# Patient Record
Sex: MALE | Race: WHITE | Employment: OTHER | RURAL
[De-identification: names, ages, dates, MRNs, and addresses within clinical notes are randomized per-mention and may not be internally consistent; named-entity substitution may affect disease eponyms.]

---

## 2017-01-01 ENCOUNTER — OFFICE VISIT (OUTPATIENT)
Dept: FAMILY MEDICINE CLINIC | Age: 72
End: 2017-01-01

## 2017-01-01 ENCOUNTER — PATIENT OUTREACH (OUTPATIENT)
Dept: FAMILY MEDICINE CLINIC | Age: 72
End: 2017-01-01

## 2017-01-01 ENCOUNTER — TELEPHONE (OUTPATIENT)
Dept: FAMILY MEDICINE CLINIC | Age: 72
End: 2017-01-01

## 2017-01-01 ENCOUNTER — HOSPITAL ENCOUNTER (INPATIENT)
Age: 72
LOS: 3 days | Discharge: HOSPICE | DRG: 178 | End: 2017-04-11
Attending: EMERGENCY MEDICINE | Admitting: FAMILY MEDICINE
Payer: MEDICARE

## 2017-01-01 ENCOUNTER — APPOINTMENT (OUTPATIENT)
Dept: GENERAL RADIOLOGY | Age: 72
DRG: 607 | End: 2017-01-01
Attending: EMERGENCY MEDICINE
Payer: MEDICARE

## 2017-01-01 ENCOUNTER — APPOINTMENT (OUTPATIENT)
Dept: CT IMAGING | Age: 72
DRG: 178 | End: 2017-01-01
Attending: FAMILY MEDICINE
Payer: MEDICARE

## 2017-01-01 ENCOUNTER — HOSPITAL ENCOUNTER (INPATIENT)
Age: 72
LOS: 2 days | Discharge: HOME HEALTH | DRG: 607 | End: 2017-02-19
Attending: EMERGENCY MEDICINE | Admitting: FAMILY MEDICINE
Payer: MEDICARE

## 2017-01-01 ENCOUNTER — TELEPHONE (OUTPATIENT)
Dept: NEUROLOGY | Age: 72
End: 2017-01-01

## 2017-01-01 ENCOUNTER — HOSPITAL ENCOUNTER (OUTPATIENT)
Dept: VASCULAR SURGERY | Age: 72
Discharge: HOME | End: 2017-01-31
Attending: FAMILY MEDICINE
Payer: MEDICARE

## 2017-01-01 ENCOUNTER — APPOINTMENT (OUTPATIENT)
Dept: GENERAL RADIOLOGY | Age: 72
DRG: 178 | End: 2017-01-01
Attending: EMERGENCY MEDICINE
Payer: MEDICARE

## 2017-01-01 ENCOUNTER — APPOINTMENT (OUTPATIENT)
Dept: GENERAL RADIOLOGY | Age: 72
DRG: 178 | End: 2017-01-01
Attending: FAMILY MEDICINE
Payer: MEDICARE

## 2017-01-01 ENCOUNTER — APPOINTMENT (OUTPATIENT)
Dept: CT IMAGING | Age: 72
DRG: 178 | End: 2017-01-01
Attending: EMERGENCY MEDICINE
Payer: MEDICARE

## 2017-01-01 ENCOUNTER — HOSPITAL ENCOUNTER (OUTPATIENT)
Dept: VASCULAR SURGERY | Age: 72
Discharge: HOME | End: 2017-02-01
Attending: FAMILY MEDICINE
Payer: MEDICARE

## 2017-01-01 ENCOUNTER — APPOINTMENT (OUTPATIENT)
Dept: MRI IMAGING | Age: 72
DRG: 178 | End: 2017-01-01
Attending: FAMILY MEDICINE
Payer: MEDICARE

## 2017-01-01 ENCOUNTER — OFFICE VISIT (OUTPATIENT)
Dept: NEUROLOGY | Age: 72
End: 2017-01-01

## 2017-01-01 VITALS
TEMPERATURE: 97.5 F | OXYGEN SATURATION: 98 % | RESPIRATION RATE: 16 BRPM | WEIGHT: 178 LBS | HEIGHT: 65 IN | BODY MASS INDEX: 29.66 KG/M2 | DIASTOLIC BLOOD PRESSURE: 64 MMHG | HEART RATE: 60 BPM | SYSTOLIC BLOOD PRESSURE: 110 MMHG

## 2017-01-01 VITALS
TEMPERATURE: 99.3 F | HEIGHT: 65 IN | WEIGHT: 180 LBS | SYSTOLIC BLOOD PRESSURE: 103 MMHG | RESPIRATION RATE: 16 BRPM | BODY MASS INDEX: 29.99 KG/M2 | OXYGEN SATURATION: 98 % | HEART RATE: 64 BPM | DIASTOLIC BLOOD PRESSURE: 61 MMHG

## 2017-01-01 VITALS
OXYGEN SATURATION: 95 % | RESPIRATION RATE: 18 BRPM | HEART RATE: 51 BPM | HEIGHT: 65 IN | SYSTOLIC BLOOD PRESSURE: 113 MMHG | WEIGHT: 174 LBS | DIASTOLIC BLOOD PRESSURE: 70 MMHG | TEMPERATURE: 97 F | BODY MASS INDEX: 28.99 KG/M2

## 2017-01-01 VITALS
HEIGHT: 65 IN | DIASTOLIC BLOOD PRESSURE: 43 MMHG | RESPIRATION RATE: 20 BRPM | OXYGEN SATURATION: 98 % | TEMPERATURE: 97.7 F | HEART RATE: 60 BPM | SYSTOLIC BLOOD PRESSURE: 114 MMHG | BODY MASS INDEX: 28.69 KG/M2 | WEIGHT: 172.2 LBS

## 2017-01-01 VITALS
SYSTOLIC BLOOD PRESSURE: 110 MMHG | TEMPERATURE: 98 F | HEIGHT: 65 IN | DIASTOLIC BLOOD PRESSURE: 62 MMHG | OXYGEN SATURATION: 94 % | RESPIRATION RATE: 16 BRPM | WEIGHT: 178 LBS | BODY MASS INDEX: 29.66 KG/M2 | HEART RATE: 62 BPM

## 2017-01-01 VITALS
RESPIRATION RATE: 18 BRPM | SYSTOLIC BLOOD PRESSURE: 125 MMHG | DIASTOLIC BLOOD PRESSURE: 66 MMHG | HEIGHT: 65 IN | WEIGHT: 183.64 LBS | TEMPERATURE: 98.5 F | HEART RATE: 71 BPM | OXYGEN SATURATION: 95 % | BODY MASS INDEX: 30.6 KG/M2

## 2017-01-01 VITALS
BODY MASS INDEX: 29.79 KG/M2 | TEMPERATURE: 98.1 F | WEIGHT: 178.8 LBS | HEIGHT: 65 IN | RESPIRATION RATE: 22 BRPM | OXYGEN SATURATION: 90 % | SYSTOLIC BLOOD PRESSURE: 137 MMHG | DIASTOLIC BLOOD PRESSURE: 98 MMHG | HEART RATE: 66 BPM

## 2017-01-01 DIAGNOSIS — Z09 HOSPITAL DISCHARGE FOLLOW-UP: ICD-10-CM

## 2017-01-01 DIAGNOSIS — R09.89 POOR CIRCULATION OF EXTREMITY: ICD-10-CM

## 2017-01-01 DIAGNOSIS — I10 ESSENTIAL HYPERTENSION, BENIGN: ICD-10-CM

## 2017-01-01 DIAGNOSIS — J01.40 ACUTE NON-RECURRENT PANSINUSITIS: Primary | ICD-10-CM

## 2017-01-01 DIAGNOSIS — L97.409 HEEL ULCER DUE TO DM (HCC): Primary | ICD-10-CM

## 2017-01-01 DIAGNOSIS — R53.81 DEBILITY: ICD-10-CM

## 2017-01-01 DIAGNOSIS — G30.1 LATE ONSET ALZHEIMER'S DISEASE WITH BEHAVIORAL DISTURBANCE (HCC): Primary | Chronic | ICD-10-CM

## 2017-01-01 DIAGNOSIS — E11.621 CONTROLLED TYPE 2 DIABETES MELLITUS WITH FOOT ULCER, WITHOUT LONG-TERM CURRENT USE OF INSULIN (HCC): Primary | ICD-10-CM

## 2017-01-01 DIAGNOSIS — E43 SEVERE PROTEIN-CALORIE MALNUTRITION (HCC): ICD-10-CM

## 2017-01-01 DIAGNOSIS — Z71.82 EXERCISE COUNSELING: ICD-10-CM

## 2017-01-01 DIAGNOSIS — L97.511 ULCER OF FOOT, RIGHT, LIMITED TO BREAKDOWN OF SKIN (HCC): ICD-10-CM

## 2017-01-01 DIAGNOSIS — S91.309A: ICD-10-CM

## 2017-01-01 DIAGNOSIS — B37.0 THRUSH OF MOUTH AND ESOPHAGUS (HCC): ICD-10-CM

## 2017-01-01 DIAGNOSIS — R21 RASH AND OTHER NONSPECIFIC SKIN ERUPTION: Primary | ICD-10-CM

## 2017-01-01 DIAGNOSIS — L97.509 CONTROLLED TYPE 2 DIABETES MELLITUS WITH FOOT ULCER, WITHOUT LONG-TERM CURRENT USE OF INSULIN (HCC): ICD-10-CM

## 2017-01-01 DIAGNOSIS — R60.0 EDEMA OF BOTH FEET: ICD-10-CM

## 2017-01-01 DIAGNOSIS — F03.90 DEMENTIA WITHOUT BEHAVIORAL DISTURBANCE, UNSPECIFIED DEMENTIA TYPE: ICD-10-CM

## 2017-01-01 DIAGNOSIS — I95.9 HYPOTENSION, UNSPECIFIED HYPOTENSION TYPE: ICD-10-CM

## 2017-01-01 DIAGNOSIS — F02.818 LATE ONSET ALZHEIMER'S DISEASE WITH BEHAVIORAL DISTURBANCE (HCC): ICD-10-CM

## 2017-01-01 DIAGNOSIS — F02.818 LATE ONSET ALZHEIMER'S DISEASE WITH BEHAVIORAL DISTURBANCE (HCC): Primary | Chronic | ICD-10-CM

## 2017-01-01 DIAGNOSIS — F02.80 ALZHEIMER'S DISEASE OF OTHER ONSET WITHOUT BEHAVIORAL DISTURBANCE: ICD-10-CM

## 2017-01-01 DIAGNOSIS — L97.411 HEEL ULCER, RIGHT, LIMITED TO BREAKDOWN OF SKIN (HCC): ICD-10-CM

## 2017-01-01 DIAGNOSIS — B37.81 THRUSH OF MOUTH AND ESOPHAGUS (HCC): ICD-10-CM

## 2017-01-01 DIAGNOSIS — E11.621 CONTROLLED TYPE 2 DIABETES MELLITUS WITH FOOT ULCER, WITHOUT LONG-TERM CURRENT USE OF INSULIN (HCC): ICD-10-CM

## 2017-01-01 DIAGNOSIS — L03.116 CELLULITIS OF LEFT LOWER EXTREMITY: Primary | ICD-10-CM

## 2017-01-01 DIAGNOSIS — L97.509 CONTROLLED TYPE 2 DIABETES MELLITUS WITH FOOT ULCER, WITHOUT LONG-TERM CURRENT USE OF INSULIN (HCC): Primary | Chronic | ICD-10-CM

## 2017-01-01 DIAGNOSIS — E78.00 PURE HYPERCHOLESTEROLEMIA: ICD-10-CM

## 2017-01-01 DIAGNOSIS — E11.621 CONTROLLED TYPE 2 DIABETES MELLITUS WITH FOOT ULCER, WITHOUT LONG-TERM CURRENT USE OF INSULIN (HCC): Primary | Chronic | ICD-10-CM

## 2017-01-01 DIAGNOSIS — R60.0 EDEMA OF BOTH FEET: Primary | ICD-10-CM

## 2017-01-01 DIAGNOSIS — G30.8 ALZHEIMER'S DISEASE OF OTHER ONSET WITHOUT BEHAVIORAL DISTURBANCE: ICD-10-CM

## 2017-01-01 DIAGNOSIS — Z71.89 GOALS OF CARE, COUNSELING/DISCUSSION: ICD-10-CM

## 2017-01-01 DIAGNOSIS — R60.0 LOCALIZED EDEMA: ICD-10-CM

## 2017-01-01 DIAGNOSIS — M48.00 SPINAL STENOSIS, UNSPECIFIED REGION OTHER THAN CERVICAL: Chronic | ICD-10-CM

## 2017-01-01 DIAGNOSIS — Z71.3 DIETARY COUNSELING AND SURVEILLANCE: ICD-10-CM

## 2017-01-01 DIAGNOSIS — R21 RASH: ICD-10-CM

## 2017-01-01 DIAGNOSIS — I10 ESSENTIAL HYPERTENSION, BENIGN: Chronic | ICD-10-CM

## 2017-01-01 DIAGNOSIS — R55 SYNCOPE AND COLLAPSE: Primary | ICD-10-CM

## 2017-01-01 DIAGNOSIS — G30.1 LATE ONSET ALZHEIMER'S DISEASE WITH BEHAVIORAL DISTURBANCE (HCC): ICD-10-CM

## 2017-01-01 DIAGNOSIS — L97.509 CONTROLLED TYPE 2 DIABETES MELLITUS WITH FOOT ULCER, WITHOUT LONG-TERM CURRENT USE OF INSULIN (HCC): Primary | ICD-10-CM

## 2017-01-01 DIAGNOSIS — L97.422 HEEL ULCER, LEFT, WITH FAT LAYER EXPOSED (HCC): ICD-10-CM

## 2017-01-01 DIAGNOSIS — E11.8 TYPE 2 DIABETES MELLITUS WITH COMPLICATION, WITHOUT LONG-TERM CURRENT USE OF INSULIN (HCC): Chronic | ICD-10-CM

## 2017-01-01 DIAGNOSIS — E11.621 HEEL ULCER DUE TO DM (HCC): Primary | ICD-10-CM

## 2017-01-01 LAB
ALBUMIN SERPL BCP-MCNC: 1.9 G/DL (ref 3.5–5)
ALBUMIN SERPL BCP-MCNC: 2 G/DL (ref 3.5–5)
ALBUMIN SERPL BCP-MCNC: 2.1 G/DL (ref 3.5–5)
ALBUMIN SERPL BCP-MCNC: 2.4 G/DL (ref 3.5–5)
ALBUMIN SERPL BCP-MCNC: 2.6 G/DL (ref 3.5–5)
ALBUMIN SERPL BCP-MCNC: 2.9 G/DL (ref 3.5–5)
ALBUMIN SERPL BCP-MCNC: 3.2 G/DL (ref 3.5–5)
ALBUMIN/GLOB SERPL: 0.5 {RATIO} (ref 1.1–2.2)
ALBUMIN/GLOB SERPL: 0.6 {RATIO} (ref 1.1–2.2)
ALBUMIN/GLOB SERPL: 0.8 {RATIO} (ref 1.1–2.2)
ALBUMIN/GLOB SERPL: 0.8 {RATIO} (ref 1.1–2.2)
ALBUMIN/GLOB SERPL: 0.9 {RATIO} (ref 1.1–2.2)
ALP SERPL-CCNC: 105 U/L (ref 45–117)
ALP SERPL-CCNC: 106 U/L (ref 45–117)
ALP SERPL-CCNC: 108 U/L (ref 45–117)
ALP SERPL-CCNC: 145 U/L (ref 45–117)
ALP SERPL-CCNC: 67 U/L (ref 45–117)
ALP SERPL-CCNC: 91 U/L (ref 45–117)
ALP SERPL-CCNC: 96 U/L (ref 45–117)
ALT SERPL-CCNC: 23 U/L (ref 12–78)
ALT SERPL-CCNC: 23 U/L (ref 12–78)
ALT SERPL-CCNC: 28 U/L (ref 12–78)
ALT SERPL-CCNC: 39 U/L (ref 12–78)
ALT SERPL-CCNC: 41 U/L (ref 12–78)
ALT SERPL-CCNC: 47 U/L (ref 12–78)
ALT SERPL-CCNC: 57 U/L (ref 12–78)
ANION GAP BLD CALC-SCNC: 10 MMOL/L (ref 5–15)
ANION GAP BLD CALC-SCNC: 11 MMOL/L (ref 5–15)
ANION GAP BLD CALC-SCNC: 5 MMOL/L (ref 5–15)
ANION GAP BLD CALC-SCNC: 7 MMOL/L (ref 5–15)
ANION GAP BLD CALC-SCNC: 7 MMOL/L (ref 5–15)
ANION GAP BLD CALC-SCNC: 9 MMOL/L (ref 5–15)
ANION GAP BLD CALC-SCNC: 9 MMOL/L (ref 5–15)
APPEARANCE UR: CLEAR
APPEARANCE UR: CLEAR
APTT PPP: 31.3 SEC (ref 22.1–32.5)
AST SERPL W P-5'-P-CCNC: 22 U/L (ref 15–37)
AST SERPL W P-5'-P-CCNC: 22 U/L (ref 15–37)
AST SERPL W P-5'-P-CCNC: 23 U/L (ref 15–37)
AST SERPL W P-5'-P-CCNC: 30 U/L (ref 15–37)
AST SERPL W P-5'-P-CCNC: 31 U/L (ref 15–37)
AST SERPL W P-5'-P-CCNC: 35 U/L (ref 15–37)
AST SERPL W P-5'-P-CCNC: 41 U/L (ref 15–37)
ATRIAL RATE: 57 BPM
ATRIAL RATE: 64 BPM
BACTERIA SPEC CULT: NORMAL
BACTERIA SPEC CULT: NORMAL
BACTERIA URNS QL MICRO: NEGATIVE /HPF
BACTERIA URNS QL MICRO: NEGATIVE /HPF
BASOPHILS # BLD AUTO: 0 K/UL (ref 0–0.1)
BASOPHILS # BLD: 0 % (ref 0–1)
BILIRUB SERPL-MCNC: 0.2 MG/DL (ref 0.2–1)
BILIRUB SERPL-MCNC: 0.3 MG/DL (ref 0.2–1)
BILIRUB SERPL-MCNC: 0.3 MG/DL (ref 0.2–1)
BILIRUB SERPL-MCNC: 0.4 MG/DL (ref 0.2–1)
BILIRUB SERPL-MCNC: 0.4 MG/DL (ref 0.2–1)
BILIRUB SERPL-MCNC: 0.5 MG/DL (ref 0.2–1)
BILIRUB SERPL-MCNC: 0.5 MG/DL (ref 0.2–1)
BILIRUB UR QL: NEGATIVE
BILIRUB UR QL: NEGATIVE
BUN SERPL-MCNC: 12 MG/DL (ref 6–20)
BUN SERPL-MCNC: 19 MG/DL (ref 6–20)
BUN SERPL-MCNC: 20 MG/DL (ref 6–20)
BUN SERPL-MCNC: 23 MG/DL (ref 6–20)
BUN SERPL-MCNC: 25 MG/DL (ref 6–20)
BUN SERPL-MCNC: 28 MG/DL (ref 6–20)
BUN SERPL-MCNC: 31 MG/DL (ref 6–20)
BUN/CREAT SERPL: 14 (ref 12–20)
BUN/CREAT SERPL: 19 (ref 12–20)
BUN/CREAT SERPL: 20 (ref 12–20)
BUN/CREAT SERPL: 21 (ref 12–20)
BUN/CREAT SERPL: 23 (ref 12–20)
BUN/CREAT SERPL: 25 (ref 12–20)
BUN/CREAT SERPL: 32 (ref 12–20)
CALCIUM SERPL-MCNC: 7.8 MG/DL (ref 8.5–10.1)
CALCIUM SERPL-MCNC: 7.9 MG/DL (ref 8.5–10.1)
CALCIUM SERPL-MCNC: 7.9 MG/DL (ref 8.5–10.1)
CALCIUM SERPL-MCNC: 8.1 MG/DL (ref 8.5–10.1)
CALCIUM SERPL-MCNC: 8.2 MG/DL (ref 8.5–10.1)
CALCIUM SERPL-MCNC: 8.4 MG/DL (ref 8.5–10.1)
CALCIUM SERPL-MCNC: 8.7 MG/DL (ref 8.5–10.1)
CALCULATED P AXIS, ECG09: -12 DEGREES
CALCULATED P AXIS, ECG09: 32 DEGREES
CALCULATED R AXIS, ECG10: -61 DEGREES
CALCULATED R AXIS, ECG10: -65 DEGREES
CALCULATED T AXIS, ECG11: 23 DEGREES
CALCULATED T AXIS, ECG11: 49 DEGREES
CHLORIDE SERPL-SCNC: 103 MMOL/L (ref 97–108)
CHLORIDE SERPL-SCNC: 103 MMOL/L (ref 97–108)
CHLORIDE SERPL-SCNC: 104 MMOL/L (ref 97–108)
CHLORIDE SERPL-SCNC: 106 MMOL/L (ref 97–108)
CHLORIDE SERPL-SCNC: 107 MMOL/L (ref 97–108)
CHLORIDE SERPL-SCNC: 110 MMOL/L (ref 97–108)
CHLORIDE SERPL-SCNC: 112 MMOL/L (ref 97–108)
CHOLEST SERPL-MCNC: 94 MG/DL
CK MB CFR SERPL CALC: 2.5 % (ref 0–2.5)
CK MB SERPL-MCNC: 4.2 NG/ML (ref 5–25)
CK SERPL-CCNC: 166 U/L (ref 39–308)
CK SERPL-CCNC: 41 U/L (ref 39–308)
CO2 SERPL-SCNC: 22 MMOL/L (ref 21–32)
CO2 SERPL-SCNC: 24 MMOL/L (ref 21–32)
CO2 SERPL-SCNC: 26 MMOL/L (ref 21–32)
CO2 SERPL-SCNC: 27 MMOL/L (ref 21–32)
CO2 SERPL-SCNC: 31 MMOL/L (ref 21–32)
COLOR UR: NORMAL
COLOR UR: NORMAL
CREAT SERPL-MCNC: 0.86 MG/DL (ref 0.7–1.3)
CREAT SERPL-MCNC: 0.92 MG/DL (ref 0.7–1.3)
CREAT SERPL-MCNC: 0.97 MG/DL (ref 0.7–1.3)
CREAT SERPL-MCNC: 1.01 MG/DL (ref 0.7–1.3)
CREAT SERPL-MCNC: 1.01 MG/DL (ref 0.7–1.3)
CREAT SERPL-MCNC: 1.17 MG/DL (ref 0.7–1.3)
CREAT SERPL-MCNC: 1.24 MG/DL (ref 0.7–1.3)
CRP SERPL-MCNC: 3.9 MG/DL
DIAGNOSIS, 93000: NORMAL
DIAGNOSIS, 93000: NORMAL
DIFFERENTIAL METHOD BLD: ABNORMAL
EOSINOPHIL # BLD: 0 K/UL (ref 0–0.4)
EOSINOPHIL # BLD: 0 K/UL (ref 0–0.4)
EOSINOPHIL # BLD: 0.2 K/UL (ref 0–0.4)
EOSINOPHIL # BLD: 0.2 K/UL (ref 0–0.4)
EOSINOPHIL # BLD: 0.3 K/UL (ref 0–0.4)
EOSINOPHIL # BLD: 0.7 K/UL (ref 0–0.4)
EOSINOPHIL NFR BLD: 0 % (ref 0–7)
EOSINOPHIL NFR BLD: 0 % (ref 0–7)
EOSINOPHIL NFR BLD: 2 % (ref 0–7)
EOSINOPHIL NFR BLD: 7 % (ref 0–7)
EPITH CASTS URNS QL MICRO: NORMAL /LPF
EPITH CASTS URNS QL MICRO: NORMAL /LPF
ERYTHROCYTE [DISTWIDTH] IN BLOOD BY AUTOMATED COUNT: 14.1 % (ref 11.5–14.5)
ERYTHROCYTE [DISTWIDTH] IN BLOOD BY AUTOMATED COUNT: 14.1 % (ref 11.5–14.5)
ERYTHROCYTE [DISTWIDTH] IN BLOOD BY AUTOMATED COUNT: 14.3 % (ref 11.5–14.5)
ERYTHROCYTE [DISTWIDTH] IN BLOOD BY AUTOMATED COUNT: 14.3 % (ref 11.5–14.5)
ERYTHROCYTE [DISTWIDTH] IN BLOOD BY AUTOMATED COUNT: 14.4 % (ref 11.5–14.5)
ERYTHROCYTE [DISTWIDTH] IN BLOOD BY AUTOMATED COUNT: 14.5 % (ref 11.5–14.5)
ERYTHROCYTE [SEDIMENTATION RATE] IN BLOOD: 21 MM/HR (ref 0–20)
EST. AVERAGE GLUCOSE BLD GHB EST-MCNC: 131 MG/DL
GLOBULIN SER CALC-MCNC: 3.3 G/DL (ref 2–4)
GLOBULIN SER CALC-MCNC: 3.3 G/DL (ref 2–4)
GLOBULIN SER CALC-MCNC: 3.4 G/DL (ref 2–4)
GLOBULIN SER CALC-MCNC: 3.4 G/DL (ref 2–4)
GLOBULIN SER CALC-MCNC: 3.5 G/DL (ref 2–4)
GLOBULIN SER CALC-MCNC: 3.7 G/DL (ref 2–4)
GLOBULIN SER CALC-MCNC: 3.9 G/DL (ref 2–4)
GLUCOSE BLD STRIP.AUTO-MCNC: 102 MG/DL (ref 65–100)
GLUCOSE BLD STRIP.AUTO-MCNC: 104 MG/DL (ref 65–100)
GLUCOSE BLD STRIP.AUTO-MCNC: 108 MG/DL (ref 65–100)
GLUCOSE BLD STRIP.AUTO-MCNC: 112 MG/DL (ref 65–100)
GLUCOSE BLD STRIP.AUTO-MCNC: 113 MG/DL (ref 65–100)
GLUCOSE BLD STRIP.AUTO-MCNC: 113 MG/DL (ref 65–100)
GLUCOSE BLD STRIP.AUTO-MCNC: 125 MG/DL (ref 65–100)
GLUCOSE BLD STRIP.AUTO-MCNC: 136 MG/DL (ref 65–100)
GLUCOSE BLD STRIP.AUTO-MCNC: 144 MG/DL (ref 65–100)
GLUCOSE BLD STRIP.AUTO-MCNC: 149 MG/DL (ref 65–100)
GLUCOSE BLD STRIP.AUTO-MCNC: 154 MG/DL (ref 65–100)
GLUCOSE BLD STRIP.AUTO-MCNC: 162 MG/DL (ref 65–100)
GLUCOSE SERPL-MCNC: 100 MG/DL (ref 65–100)
GLUCOSE SERPL-MCNC: 108 MG/DL (ref 65–100)
GLUCOSE SERPL-MCNC: 132 MG/DL (ref 65–100)
GLUCOSE SERPL-MCNC: 176 MG/DL (ref 65–100)
GLUCOSE SERPL-MCNC: 93 MG/DL (ref 65–100)
GLUCOSE SERPL-MCNC: 97 MG/DL (ref 65–100)
GLUCOSE SERPL-MCNC: 99 MG/DL (ref 65–100)
GLUCOSE UR STRIP.AUTO-MCNC: NEGATIVE MG/DL
GLUCOSE UR STRIP.AUTO-MCNC: NEGATIVE MG/DL
HBA1C MFR BLD: 6.2 % (ref 4.2–6.3)
HCT VFR BLD AUTO: 30.1 % (ref 36.6–50.3)
HCT VFR BLD AUTO: 31.8 % (ref 36.6–50.3)
HCT VFR BLD AUTO: 31.9 % (ref 36.6–50.3)
HCT VFR BLD AUTO: 36.6 % (ref 36.6–50.3)
HCT VFR BLD AUTO: 37.9 % (ref 36.6–50.3)
HCT VFR BLD AUTO: 38.6 % (ref 36.6–50.3)
HDLC SERPL-MCNC: 41 MG/DL
HDLC SERPL: 2.3 {RATIO} (ref 0–5)
HGB BLD-MCNC: 10.6 G/DL (ref 12.1–17)
HGB BLD-MCNC: 10.6 G/DL (ref 12.1–17)
HGB BLD-MCNC: 12.1 G/DL (ref 12.1–17)
HGB BLD-MCNC: 12.3 G/DL (ref 12.1–17)
HGB BLD-MCNC: 12.6 G/DL (ref 12.1–17)
HGB BLD-MCNC: 9.6 G/DL (ref 12.1–17)
HGB UR QL STRIP: NEGATIVE
HGB UR QL STRIP: NEGATIVE
HIV 1+2 AB+HIV1 P24 AG SERPL QL IA: NONREACTIVE
HIV12 RESULT COMMENT, HHIVC: NORMAL
INR BLD: 1.2 (ref 0.9–1.2)
INR PPP: 1.1 (ref 0.9–1.1)
INR PPP: 1.2 (ref 0.9–1.1)
KETONES UR QL STRIP.AUTO: NEGATIVE MG/DL
KETONES UR QL STRIP.AUTO: NEGATIVE MG/DL
LACTATE SERPL-SCNC: 1.2 MMOL/L (ref 0.4–2)
LDLC SERPL CALC-MCNC: 42.2 MG/DL (ref 0–100)
LEUKOCYTE ESTERASE UR QL STRIP.AUTO: NEGATIVE
LEUKOCYTE ESTERASE UR QL STRIP.AUTO: NEGATIVE
LIPID PROFILE,FLP: NORMAL
LYMPHOCYTES # BLD AUTO: 19 % (ref 12–49)
LYMPHOCYTES # BLD AUTO: 19 % (ref 12–49)
LYMPHOCYTES # BLD AUTO: 20 % (ref 12–49)
LYMPHOCYTES # BLD AUTO: 22 % (ref 12–49)
LYMPHOCYTES # BLD AUTO: 26 % (ref 12–49)
LYMPHOCYTES # BLD AUTO: 7 % (ref 12–49)
LYMPHOCYTES # BLD: 1.1 K/UL (ref 0.8–3.5)
LYMPHOCYTES # BLD: 1.4 K/UL (ref 0.8–3.5)
LYMPHOCYTES # BLD: 2.1 K/UL (ref 0.8–3.5)
LYMPHOCYTES # BLD: 2.3 K/UL (ref 0.8–3.5)
LYMPHOCYTES # BLD: 2.4 K/UL (ref 0.8–3.5)
LYMPHOCYTES # BLD: 2.7 K/UL (ref 0.8–3.5)
MAGNESIUM SERPL-MCNC: 1.8 MG/DL (ref 1.6–2.4)
MAGNESIUM SERPL-MCNC: 1.8 MG/DL (ref 1.6–2.4)
MAGNESIUM SERPL-MCNC: 1.9 MG/DL (ref 1.6–2.4)
MAGNESIUM SERPL-MCNC: 1.9 MG/DL (ref 1.6–2.4)
MAGNESIUM SERPL-MCNC: 2 MG/DL (ref 1.6–2.4)
MCH RBC QN AUTO: 28.6 PG (ref 26–34)
MCH RBC QN AUTO: 28.7 PG (ref 26–34)
MCH RBC QN AUTO: 28.9 PG (ref 26–34)
MCH RBC QN AUTO: 29.6 PG (ref 26–34)
MCH RBC QN AUTO: 30.4 PG (ref 26–34)
MCH RBC QN AUTO: 30.7 PG (ref 26–34)
MCHC RBC AUTO-ENTMCNC: 31.3 G/DL (ref 30–36.5)
MCHC RBC AUTO-ENTMCNC: 31.9 G/DL (ref 30–36.5)
MCHC RBC AUTO-ENTMCNC: 33.2 G/DL (ref 30–36.5)
MCHC RBC AUTO-ENTMCNC: 33.2 G/DL (ref 30–36.5)
MCHC RBC AUTO-ENTMCNC: 33.3 G/DL (ref 30–36.5)
MCHC RBC AUTO-ENTMCNC: 33.6 G/DL (ref 30–36.5)
MCV RBC AUTO: 86.4 FL (ref 80–99)
MCV RBC AUTO: 88.8 FL (ref 80–99)
MCV RBC AUTO: 90.7 FL (ref 80–99)
MCV RBC AUTO: 91.3 FL (ref 80–99)
MONOCYTES # BLD: 0.1 K/UL (ref 0–1)
MONOCYTES # BLD: 0.7 K/UL (ref 0–1)
MONOCYTES # BLD: 0.7 K/UL (ref 0–1)
MONOCYTES # BLD: 0.8 K/UL (ref 0–1)
MONOCYTES # BLD: 0.9 K/UL (ref 0–1)
MONOCYTES # BLD: 1.1 K/UL (ref 0–1)
MONOCYTES NFR BLD AUTO: 1 % (ref 5–13)
MONOCYTES NFR BLD AUTO: 5 % (ref 5–13)
MONOCYTES NFR BLD AUTO: 7 % (ref 5–13)
MONOCYTES NFR BLD AUTO: 8 % (ref 5–13)
MONOCYTES NFR BLD AUTO: 8 % (ref 5–13)
MONOCYTES NFR BLD AUTO: 9 % (ref 5–13)
NEUTS SEG # BLD: 14.3 K/UL (ref 1.8–8)
NEUTS SEG # BLD: 5.8 K/UL (ref 1.8–8)
NEUTS SEG # BLD: 5.8 K/UL (ref 1.8–8)
NEUTS SEG # BLD: 7.3 K/UL (ref 1.8–8)
NEUTS SEG # BLD: 8 K/UL (ref 1.8–8)
NEUTS SEG # BLD: 8.1 K/UL (ref 1.8–8)
NEUTS SEG NFR BLD AUTO: 64 % (ref 32–75)
NEUTS SEG NFR BLD AUTO: 67 % (ref 32–75)
NEUTS SEG NFR BLD AUTO: 68 % (ref 32–75)
NEUTS SEG NFR BLD AUTO: 69 % (ref 32–75)
NEUTS SEG NFR BLD AUTO: 80 % (ref 32–75)
NEUTS SEG NFR BLD AUTO: 88 % (ref 32–75)
NITRITE UR QL STRIP.AUTO: NEGATIVE
NITRITE UR QL STRIP.AUTO: NEGATIVE
P-R INTERVAL, ECG05: 198 MS
P-R INTERVAL, ECG05: 240 MS
PH UR STRIP: 7 [PH] (ref 5–8)
PH UR STRIP: 7 [PH] (ref 5–8)
PHOSPHATE SERPL-MCNC: 2.9 MG/DL (ref 2.6–4.7)
PHOSPHATE SERPL-MCNC: 2.9 MG/DL (ref 2.6–4.7)
PHOSPHATE SERPL-MCNC: 3.5 MG/DL (ref 2.6–4.7)
PLATELET # BLD AUTO: 175 K/UL (ref 150–400)
PLATELET # BLD AUTO: 185 K/UL (ref 150–400)
PLATELET # BLD AUTO: 299 K/UL (ref 150–400)
PLATELET # BLD AUTO: 319 K/UL (ref 150–400)
PLATELET # BLD AUTO: 337 K/UL (ref 150–400)
PLATELET # BLD AUTO: 373 K/UL (ref 150–400)
POTASSIUM SERPL-SCNC: 3.7 MMOL/L (ref 3.5–5.1)
POTASSIUM SERPL-SCNC: 4 MMOL/L (ref 3.5–5.1)
POTASSIUM SERPL-SCNC: 4 MMOL/L (ref 3.5–5.1)
POTASSIUM SERPL-SCNC: 4.2 MMOL/L (ref 3.5–5.1)
POTASSIUM SERPL-SCNC: 4.3 MMOL/L (ref 3.5–5.1)
POTASSIUM SERPL-SCNC: 4.4 MMOL/L (ref 3.5–5.1)
POTASSIUM SERPL-SCNC: 5.4 MMOL/L (ref 3.5–5.1)
PROT SERPL-MCNC: 5.4 G/DL (ref 6.4–8.2)
PROT SERPL-MCNC: 5.4 G/DL (ref 6.4–8.2)
PROT SERPL-MCNC: 5.6 G/DL (ref 6.4–8.2)
PROT SERPL-MCNC: 5.9 G/DL (ref 6.4–8.2)
PROT SERPL-MCNC: 6.3 G/DL (ref 6.4–8.2)
PROT SERPL-MCNC: 6.4 G/DL (ref 6.4–8.2)
PROT SERPL-MCNC: 6.6 G/DL (ref 6.4–8.2)
PROT UR STRIP-MCNC: NEGATIVE MG/DL
PROT UR STRIP-MCNC: NEGATIVE MG/DL
PROTHROMBIN TIME: 10.9 SEC (ref 9–11.1)
PROTHROMBIN TIME: 11.9 SEC (ref 9–11.1)
Q-T INTERVAL, ECG07: 412 MS
Q-T INTERVAL, ECG07: 462 MS
QRS DURATION, ECG06: 102 MS
QRS DURATION, ECG06: 102 MS
QTC CALCULATION (BEZET), ECG08: 425 MS
QTC CALCULATION (BEZET), ECG08: 449 MS
RBC # BLD AUTO: 3.32 M/UL (ref 4.1–5.7)
RBC # BLD AUTO: 3.58 M/UL (ref 4.1–5.7)
RBC # BLD AUTO: 3.69 M/UL (ref 4.1–5.7)
RBC # BLD AUTO: 4.01 M/UL (ref 4.1–5.7)
RBC # BLD AUTO: 4.15 M/UL (ref 4.1–5.7)
RBC # BLD AUTO: 4.23 M/UL (ref 4.1–5.7)
RBC #/AREA URNS HPF: NORMAL /HPF (ref 0–5)
RBC #/AREA URNS HPF: NORMAL /HPF (ref 0–5)
RBC MORPH BLD: ABNORMAL
SERVICE CMNT-IMP: ABNORMAL
SERVICE CMNT-IMP: NORMAL
SERVICE CMNT-IMP: NORMAL
SODIUM SERPL-SCNC: 138 MMOL/L (ref 136–145)
SODIUM SERPL-SCNC: 139 MMOL/L (ref 136–145)
SODIUM SERPL-SCNC: 139 MMOL/L (ref 136–145)
SODIUM SERPL-SCNC: 140 MMOL/L (ref 136–145)
SODIUM SERPL-SCNC: 140 MMOL/L (ref 136–145)
SODIUM SERPL-SCNC: 143 MMOL/L (ref 136–145)
SODIUM SERPL-SCNC: 146 MMOL/L (ref 136–145)
SP GR UR REFRACTOMETRY: 1.01 (ref 1–1.03)
SP GR UR REFRACTOMETRY: 1.02 (ref 1–1.03)
T PALLIDUM AB SER QL IA: NEGATIVE
THERAPEUTIC RANGE,PTTT: NORMAL SECS (ref 58–77)
TRIGL SERPL-MCNC: 54 MG/DL (ref ?–150)
TROPONIN I SERPL-MCNC: <0.04 NG/ML
TSH SERPL DL<=0.05 MIU/L-ACNC: 1.5 UIU/ML (ref 0.36–3.74)
UA: UC IF INDICATED,UAUC: NORMAL
UROBILINOGEN UR QL STRIP.AUTO: 0.2 EU/DL (ref 0.2–1)
UROBILINOGEN UR QL STRIP.AUTO: 0.2 EU/DL (ref 0.2–1)
VENTRICULAR RATE, ECG03: 57 BPM
VENTRICULAR RATE, ECG03: 64 BPM
VLDLC SERPL CALC-MCNC: 10.8 MG/DL
WBC # BLD AUTO: 10.9 K/UL (ref 4.1–11.1)
WBC # BLD AUTO: 11.8 K/UL (ref 4.1–11.1)
WBC # BLD AUTO: 11.9 K/UL (ref 4.1–11.1)
WBC # BLD AUTO: 16.2 K/UL (ref 4.1–11.1)
WBC # BLD AUTO: 7.2 K/UL (ref 4.1–11.1)
WBC # BLD AUTO: 9 K/UL (ref 4.1–11.1)
WBC MORPH BLD: ABNORMAL
WBC URNS QL MICRO: NORMAL /HPF (ref 0–4)
WBC URNS QL MICRO: NORMAL /HPF (ref 0–4)

## 2017-01-01 PROCEDURE — 36415 COLL VENOUS BLD VENIPUNCTURE: CPT | Performed by: EMERGENCY MEDICINE

## 2017-01-01 PROCEDURE — 81001 URINALYSIS AUTO W/SCOPE: CPT | Performed by: EMERGENCY MEDICINE

## 2017-01-01 PROCEDURE — 74011000258 HC RX REV CODE- 258: Performed by: FAMILY MEDICINE

## 2017-01-01 PROCEDURE — 51701 INSERT BLADDER CATHETER: CPT

## 2017-01-01 PROCEDURE — 93005 ELECTROCARDIOGRAM TRACING: CPT

## 2017-01-01 PROCEDURE — 36415 COLL VENOUS BLD VENIPUNCTURE: CPT | Performed by: FAMILY MEDICINE

## 2017-01-01 PROCEDURE — 71010 XR CHEST PORT: CPT

## 2017-01-01 PROCEDURE — 77030010545

## 2017-01-01 PROCEDURE — 74011250637 HC RX REV CODE- 250/637: Performed by: FAMILY MEDICINE

## 2017-01-01 PROCEDURE — 80053 COMPREHEN METABOLIC PANEL: CPT | Performed by: EMERGENCY MEDICINE

## 2017-01-01 PROCEDURE — 74011636637 HC RX REV CODE- 636/637: Performed by: FAMILY MEDICINE

## 2017-01-01 PROCEDURE — 97530 THERAPEUTIC ACTIVITIES: CPT

## 2017-01-01 PROCEDURE — 74011250636 HC RX REV CODE- 250/636: Performed by: FAMILY MEDICINE

## 2017-01-01 PROCEDURE — 70551 MRI BRAIN STEM W/O DYE: CPT

## 2017-01-01 PROCEDURE — 70450 CT HEAD/BRAIN W/O DYE: CPT

## 2017-01-01 PROCEDURE — 84100 ASSAY OF PHOSPHORUS: CPT

## 2017-01-01 PROCEDURE — 84484 ASSAY OF TROPONIN QUANT: CPT | Performed by: EMERGENCY MEDICINE

## 2017-01-01 PROCEDURE — 77030011256 HC DRSG MEPILEX <16IN NO BORD MOLN -A

## 2017-01-01 PROCEDURE — 85610 PROTHROMBIN TIME: CPT | Performed by: EMERGENCY MEDICINE

## 2017-01-01 PROCEDURE — 83036 HEMOGLOBIN GLYCOSYLATED A1C: CPT

## 2017-01-01 PROCEDURE — 85025 COMPLETE CBC W/AUTO DIFF WBC: CPT | Performed by: FAMILY MEDICINE

## 2017-01-01 PROCEDURE — 82962 GLUCOSE BLOOD TEST: CPT

## 2017-01-01 PROCEDURE — 85652 RBC SED RATE AUTOMATED: CPT | Performed by: EMERGENCY MEDICINE

## 2017-01-01 PROCEDURE — 85025 COMPLETE CBC W/AUTO DIFF WBC: CPT | Performed by: EMERGENCY MEDICINE

## 2017-01-01 PROCEDURE — 80061 LIPID PANEL: CPT

## 2017-01-01 PROCEDURE — 82550 ASSAY OF CK (CPK): CPT

## 2017-01-01 PROCEDURE — 93970 EXTREMITY STUDY: CPT

## 2017-01-01 PROCEDURE — 96375 TX/PRO/DX INJ NEW DRUG ADDON: CPT

## 2017-01-01 PROCEDURE — 87040 BLOOD CULTURE FOR BACTERIA: CPT | Performed by: EMERGENCY MEDICINE

## 2017-01-01 PROCEDURE — 74011636320 HC RX REV CODE- 636/320: Performed by: FAMILY MEDICINE

## 2017-01-01 PROCEDURE — 85730 THROMBOPLASTIN TIME PARTIAL: CPT

## 2017-01-01 PROCEDURE — 84443 ASSAY THYROID STIM HORMONE: CPT

## 2017-01-01 PROCEDURE — 77030015696

## 2017-01-01 PROCEDURE — 85025 COMPLETE CBC W/AUTO DIFF WBC: CPT

## 2017-01-01 PROCEDURE — 77030020186 HC BOOT HL PROTCT SAGE -B

## 2017-01-01 PROCEDURE — 74011000258 HC RX REV CODE- 258: Performed by: EMERGENCY MEDICINE

## 2017-01-01 PROCEDURE — 65660000000 HC RM CCU STEPDOWN

## 2017-01-01 PROCEDURE — 92610 EVALUATE SWALLOWING FUNCTION: CPT

## 2017-01-01 PROCEDURE — 83735 ASSAY OF MAGNESIUM: CPT

## 2017-01-01 PROCEDURE — 74011000250 HC RX REV CODE- 250: Performed by: EMERGENCY MEDICINE

## 2017-01-01 PROCEDURE — 97162 PT EVAL MOD COMPLEX 30 MIN: CPT

## 2017-01-01 PROCEDURE — 97165 OT EVAL LOW COMPLEX 30 MIN: CPT

## 2017-01-01 PROCEDURE — 74011250636 HC RX REV CODE- 250/636: Performed by: EMERGENCY MEDICINE

## 2017-01-01 PROCEDURE — 82550 ASSAY OF CK (CPK): CPT | Performed by: EMERGENCY MEDICINE

## 2017-01-01 PROCEDURE — 92526 ORAL FUNCTION THERAPY: CPT

## 2017-01-01 PROCEDURE — 85610 PROTHROMBIN TIME: CPT

## 2017-01-01 PROCEDURE — 74011000250 HC RX REV CODE- 250: Performed by: FAMILY MEDICINE

## 2017-01-01 PROCEDURE — 87389 HIV-1 AG W/HIV-1&-2 AB AG IA: CPT

## 2017-01-01 PROCEDURE — C1758 CATHETER, URETERAL: HCPCS

## 2017-01-01 PROCEDURE — 96365 THER/PROPH/DIAG IV INF INIT: CPT

## 2017-01-01 PROCEDURE — 80053 COMPREHEN METABOLIC PANEL: CPT | Performed by: FAMILY MEDICINE

## 2017-01-01 PROCEDURE — 36415 COLL VENOUS BLD VENIPUNCTURE: CPT

## 2017-01-01 PROCEDURE — 73630 X-RAY EXAM OF FOOT: CPT

## 2017-01-01 PROCEDURE — 80053 COMPREHEN METABOLIC PANEL: CPT

## 2017-01-01 PROCEDURE — 77030011943

## 2017-01-01 PROCEDURE — 86780 TREPONEMA PALLIDUM: CPT

## 2017-01-01 PROCEDURE — 93923 UPR/LXTR ART STDY 3+ LVLS: CPT

## 2017-01-01 PROCEDURE — 93306 TTE W/DOPPLER COMPLETE: CPT

## 2017-01-01 PROCEDURE — 83605 ASSAY OF LACTIC ACID: CPT | Performed by: EMERGENCY MEDICINE

## 2017-01-01 PROCEDURE — 96361 HYDRATE IV INFUSION ADD-ON: CPT

## 2017-01-01 PROCEDURE — 86140 C-REACTIVE PROTEIN: CPT | Performed by: EMERGENCY MEDICINE

## 2017-01-01 PROCEDURE — 83735 ASSAY OF MAGNESIUM: CPT | Performed by: EMERGENCY MEDICINE

## 2017-01-01 PROCEDURE — 70496 CT ANGIOGRAPHY HEAD: CPT

## 2017-01-01 PROCEDURE — 97161 PT EVAL LOW COMPLEX 20 MIN: CPT

## 2017-01-01 PROCEDURE — 84484 ASSAY OF TROPONIN QUANT: CPT

## 2017-01-01 PROCEDURE — 99285 EMERGENCY DEPT VISIT HI MDM: CPT

## 2017-01-01 PROCEDURE — 76450000000

## 2017-01-01 PROCEDURE — 96374 THER/PROPH/DIAG INJ IV PUSH: CPT

## 2017-01-01 PROCEDURE — 74011250636 HC RX REV CODE- 250/636

## 2017-01-01 PROCEDURE — 74011250637 HC RX REV CODE- 250/637: Performed by: HOSPITALIST

## 2017-01-01 RX ORDER — OMEPRAZOLE 20 MG/1
40 CAPSULE, DELAYED RELEASE ORAL DAILY
Status: DISCONTINUED | OUTPATIENT
Start: 2017-01-01 | End: 2017-01-01

## 2017-01-01 RX ORDER — PREGABALIN 150 MG/1
150 CAPSULE ORAL 3 TIMES DAILY
Qty: 90 CAP | Refills: 3 | Status: SHIPPED | OUTPATIENT
Start: 2017-01-01 | End: 2017-01-01

## 2017-01-01 RX ORDER — PREGABALIN 50 MG/1
50 CAPSULE ORAL 3 TIMES DAILY
Status: DISCONTINUED | OUTPATIENT
Start: 2017-01-01 | End: 2017-01-01 | Stop reason: HOSPADM

## 2017-01-01 RX ORDER — LORAZEPAM 0.5 MG/1
0.5 TABLET ORAL
COMMUNITY

## 2017-01-01 RX ORDER — LISINOPRIL 5 MG/1
5 TABLET ORAL DAILY
Status: DISCONTINUED | OUTPATIENT
Start: 2017-01-01 | End: 2017-01-01 | Stop reason: HOSPADM

## 2017-01-01 RX ORDER — PREGABALIN 50 MG/1
50 CAPSULE ORAL 3 TIMES DAILY
Qty: 90 CAP | Refills: 0 | Status: SHIPPED | OUTPATIENT
Start: 2017-01-01 | End: 2017-01-01 | Stop reason: SDUPTHER

## 2017-01-01 RX ORDER — ATENOLOL 50 MG/1
50 TABLET ORAL DAILY
Status: DISCONTINUED | OUTPATIENT
Start: 2017-01-01 | End: 2017-01-01 | Stop reason: HOSPADM

## 2017-01-01 RX ORDER — FAMOTIDINE 10 MG/ML
20 INJECTION INTRAVENOUS
Status: COMPLETED | OUTPATIENT
Start: 2017-01-01 | End: 2017-01-01

## 2017-01-01 RX ORDER — RISPERIDONE 1 MG/1
1 TABLET, FILM COATED ORAL DAILY
Status: DISCONTINUED | OUTPATIENT
Start: 2017-01-01 | End: 2017-01-01 | Stop reason: HOSPADM

## 2017-01-01 RX ORDER — GLUCOSAMINE/CHONDR SU A SOD 750-600 MG
1 TABLET ORAL
COMMUNITY

## 2017-01-01 RX ORDER — CEFPROZIL 500 MG/1
500 TABLET, FILM COATED ORAL 2 TIMES DAILY
Qty: 20 TAB | Refills: 0 | Status: SHIPPED | OUTPATIENT
Start: 2017-01-01 | End: 2017-01-01

## 2017-01-01 RX ORDER — DONEPEZIL HYDROCHLORIDE 10 MG/1
10 TABLET, FILM COATED ORAL
COMMUNITY

## 2017-01-01 RX ORDER — TRAMADOL HYDROCHLORIDE 50 MG/1
TABLET ORAL
Qty: 90 TAB | Refills: 0 | Status: SHIPPED | OUTPATIENT
Start: 2017-01-01 | End: 2017-01-01 | Stop reason: SDUPTHER

## 2017-01-01 RX ORDER — LANOLIN ALCOHOL/MO/W.PET/CERES
3 CREAM (GRAM) TOPICAL
Status: DISCONTINUED | OUTPATIENT
Start: 2017-01-01 | End: 2017-01-01 | Stop reason: HOSPADM

## 2017-01-01 RX ORDER — SERTRALINE HYDROCHLORIDE 50 MG/1
150 TABLET, FILM COATED ORAL DAILY
Status: DISCONTINUED | OUTPATIENT
Start: 2017-01-01 | End: 2017-01-01 | Stop reason: HOSPADM

## 2017-01-01 RX ORDER — SODIUM CHLORIDE 0.9 % (FLUSH) 0.9 %
5-10 SYRINGE (ML) INJECTION AS NEEDED
Status: DISCONTINUED | OUTPATIENT
Start: 2017-01-01 | End: 2017-01-01 | Stop reason: HOSPADM

## 2017-01-01 RX ORDER — DONEPEZIL HYDROCHLORIDE 5 MG/1
10 TABLET, FILM COATED ORAL DAILY
Status: DISCONTINUED | OUTPATIENT
Start: 2017-01-01 | End: 2017-01-01 | Stop reason: HOSPADM

## 2017-01-01 RX ORDER — TRAZODONE HYDROCHLORIDE 50 MG/1
150 TABLET ORAL
Status: DISCONTINUED | OUTPATIENT
Start: 2017-01-01 | End: 2017-01-01

## 2017-01-01 RX ORDER — HYDROCODONE BITARTRATE AND ACETAMINOPHEN 5; 325 MG/1; MG/1
TABLET ORAL
COMMUNITY
End: 2017-01-01 | Stop reason: SDUPTHER

## 2017-01-01 RX ORDER — SCOLOPAMINE TRANSDERMAL SYSTEM 1 MG/1
1.5 PATCH, EXTENDED RELEASE TRANSDERMAL
Status: DISCONTINUED | OUTPATIENT
Start: 2017-01-01 | End: 2017-01-01 | Stop reason: HOSPADM

## 2017-01-01 RX ORDER — TRAZODONE HYDROCHLORIDE 50 MG/1
50 TABLET ORAL
Status: DISCONTINUED | OUTPATIENT
Start: 2017-01-01 | End: 2017-01-01 | Stop reason: HOSPADM

## 2017-01-01 RX ORDER — PREDNISONE 20 MG/1
60 TABLET ORAL
Qty: 9 TAB | Refills: 0 | Status: SHIPPED | OUTPATIENT
Start: 2017-01-01 | End: 2017-01-01

## 2017-01-01 RX ORDER — THERA TABS 400 MCG
1 TAB ORAL DAILY
COMMUNITY

## 2017-01-01 RX ORDER — GABAPENTIN 300 MG/1
300 CAPSULE ORAL 3 TIMES DAILY
Qty: 90 CAP | Refills: 5 | Status: SHIPPED | OUTPATIENT
Start: 2017-01-01 | End: 2017-01-01

## 2017-01-01 RX ORDER — SODIUM CHLORIDE 0.9 % (FLUSH) 0.9 %
5-10 SYRINGE (ML) INJECTION EVERY 8 HOURS
Status: DISCONTINUED | OUTPATIENT
Start: 2017-01-01 | End: 2017-01-01 | Stop reason: HOSPADM

## 2017-01-01 RX ORDER — RISPERIDONE 1 MG/1
1 TABLET, FILM COATED ORAL
Status: DISCONTINUED | OUTPATIENT
Start: 2017-01-01 | End: 2017-01-01 | Stop reason: HOSPADM

## 2017-01-01 RX ORDER — LEVOFLOXACIN 5 MG/ML
750 INJECTION, SOLUTION INTRAVENOUS EVERY 24 HOURS
Status: DISCONTINUED | OUTPATIENT
Start: 2017-01-01 | End: 2017-01-01

## 2017-01-01 RX ORDER — LORAZEPAM 0.5 MG/1
0.5 TABLET ORAL
Status: DISCONTINUED | OUTPATIENT
Start: 2017-01-01 | End: 2017-01-01 | Stop reason: HOSPADM

## 2017-01-01 RX ORDER — SIMVASTATIN 20 MG/1
40 TABLET, FILM COATED ORAL DAILY
Status: DISCONTINUED | OUTPATIENT
Start: 2017-01-01 | End: 2017-01-01 | Stop reason: HOSPADM

## 2017-01-01 RX ORDER — LATANOPROST 50 UG/ML
1 SOLUTION/ DROPS OPHTHALMIC
Status: DISCONTINUED | OUTPATIENT
Start: 2017-01-01 | End: 2017-01-01 | Stop reason: HOSPADM

## 2017-01-01 RX ORDER — PREDNISONE 20 MG/1
60 TABLET ORAL
Status: DISCONTINUED | OUTPATIENT
Start: 2017-01-01 | End: 2017-01-01 | Stop reason: HOSPADM

## 2017-01-01 RX ORDER — DICLOXACILLIN SODIUM 250 MG/1
250 CAPSULE ORAL
Qty: 40 CAP | Refills: 0 | Status: SHIPPED | OUTPATIENT
Start: 2017-01-01 | End: 2017-01-01

## 2017-01-01 RX ORDER — LORAZEPAM 0.5 MG/1
0.5 TABLET ORAL EVERY EVENING
Status: DISCONTINUED | OUTPATIENT
Start: 2017-01-01 | End: 2017-01-01 | Stop reason: HOSPADM

## 2017-01-01 RX ORDER — MAGNESIUM SULFATE 100 %
4 CRYSTALS MISCELLANEOUS AS NEEDED
Status: DISCONTINUED | OUTPATIENT
Start: 2017-01-01 | End: 2017-01-01 | Stop reason: HOSPADM

## 2017-01-01 RX ORDER — ATENOLOL 50 MG/1
25 TABLET ORAL DAILY
COMMUNITY

## 2017-01-01 RX ORDER — MELATONIN
1000 DAILY
Status: DISCONTINUED | OUTPATIENT
Start: 2017-01-01 | End: 2017-01-01 | Stop reason: HOSPADM

## 2017-01-01 RX ORDER — SIMVASTATIN 20 MG/1
40 TABLET, FILM COATED ORAL
Status: DISCONTINUED | OUTPATIENT
Start: 2017-01-01 | End: 2017-01-01 | Stop reason: HOSPADM

## 2017-01-01 RX ORDER — DICLOXACILLIN SODIUM 250 MG/1
250 CAPSULE ORAL
COMMUNITY
Start: 2017-01-01 | End: 2017-01-01

## 2017-01-01 RX ORDER — SODIUM CHLORIDE 9 MG/ML
1500 INJECTION, SOLUTION INTRAVENOUS ONCE
Status: DISCONTINUED | OUTPATIENT
Start: 2017-01-01 | End: 2017-01-01 | Stop reason: SDUPTHER

## 2017-01-01 RX ORDER — PREGABALIN 150 MG/1
150 CAPSULE ORAL 2 TIMES DAILY
COMMUNITY

## 2017-01-01 RX ORDER — PREDNISONE 20 MG/1
60 TABLET ORAL DAILY
Qty: 3 TAB | Refills: 0 | Status: SHIPPED | OUTPATIENT
Start: 2017-01-01 | End: 2017-01-01 | Stop reason: ALTCHOICE

## 2017-01-01 RX ORDER — PANTOPRAZOLE SODIUM 40 MG/1
40 TABLET, DELAYED RELEASE ORAL
Status: DISCONTINUED | OUTPATIENT
Start: 2017-01-01 | End: 2017-01-01 | Stop reason: HOSPADM

## 2017-01-01 RX ORDER — DEXTROSE 50 % IN WATER (D50W) INTRAVENOUS SYRINGE
12.5-25 AS NEEDED
Status: DISCONTINUED | OUTPATIENT
Start: 2017-01-01 | End: 2017-01-01 | Stop reason: HOSPADM

## 2017-01-01 RX ORDER — PANTOPRAZOLE SODIUM 40 MG/1
40 TABLET, DELAYED RELEASE ORAL DAILY
Status: DISCONTINUED | OUTPATIENT
Start: 2017-01-01 | End: 2017-01-01 | Stop reason: HOSPADM

## 2017-01-01 RX ORDER — HYDROCODONE BITARTRATE AND ACETAMINOPHEN 5; 325 MG/1; MG/1
1 TABLET ORAL
Qty: 120 TAB | Refills: 0 | Status: SHIPPED | OUTPATIENT
Start: 2017-01-01 | End: 2017-01-01

## 2017-01-01 RX ORDER — TRAMADOL HYDROCHLORIDE 50 MG/1
50 TABLET ORAL
Qty: 120 TAB | Refills: 0 | Status: SHIPPED | OUTPATIENT
Start: 2017-01-01 | End: 2017-01-01

## 2017-01-01 RX ORDER — DOXYCYCLINE 100 MG/1
100 TABLET ORAL 2 TIMES DAILY
Qty: 20 TAB | Refills: 0 | Status: SHIPPED | OUTPATIENT
Start: 2017-01-01 | End: 2017-01-01

## 2017-01-01 RX ORDER — AMOXICILLIN AND CLAVULANATE POTASSIUM 875; 125 MG/1; MG/1
1 TABLET, FILM COATED ORAL 2 TIMES DAILY
Qty: 8 TAB | Refills: 0 | Status: SHIPPED | OUTPATIENT
Start: 2017-01-01 | End: 2017-01-01

## 2017-01-01 RX ORDER — TRIAMCINOLONE ACETONIDE 1 MG/G
28.4 CREAM TOPICAL 2 TIMES DAILY
Qty: 15 G | Refills: 0 | Status: SHIPPED | OUTPATIENT
Start: 2017-01-01 | End: 2017-01-01

## 2017-01-01 RX ORDER — DONEPEZIL HYDROCHLORIDE 5 MG/1
10 TABLET, FILM COATED ORAL
Status: DISCONTINUED | OUTPATIENT
Start: 2017-01-01 | End: 2017-01-01 | Stop reason: HOSPADM

## 2017-01-01 RX ORDER — TRIAMCINOLONE ACETONIDE 1 MG/G
CREAM TOPICAL 2 TIMES DAILY
Status: DISCONTINUED | OUTPATIENT
Start: 2017-01-01 | End: 2017-01-01 | Stop reason: HOSPADM

## 2017-01-01 RX ORDER — DIPHENHYDRAMINE HCL 25 MG
50 CAPSULE ORAL EVERY 6 HOURS
Status: DISCONTINUED | OUTPATIENT
Start: 2017-01-01 | End: 2017-01-01 | Stop reason: HOSPADM

## 2017-01-01 RX ORDER — RISPERIDONE 1 MG/1
2 TABLET, FILM COATED ORAL
Status: DISCONTINUED | OUTPATIENT
Start: 2017-01-01 | End: 2017-01-01 | Stop reason: HOSPADM

## 2017-01-01 RX ORDER — PREGABALIN 150 MG/1
150 CAPSULE ORAL 3 TIMES DAILY
Qty: 90 CAP | Refills: 3 | Status: SHIPPED | OUTPATIENT
Start: 2017-01-01 | End: 2017-01-01 | Stop reason: SDUPTHER

## 2017-01-01 RX ORDER — GABAPENTIN 300 MG/1
300 CAPSULE ORAL 3 TIMES DAILY
COMMUNITY
End: 2017-01-01 | Stop reason: SDUPTHER

## 2017-01-01 RX ORDER — SODIUM CHLORIDE 9 MG/ML
100 INJECTION, SOLUTION INTRAVENOUS CONTINUOUS
Status: DISCONTINUED | OUTPATIENT
Start: 2017-01-01 | End: 2017-01-01 | Stop reason: HOSPADM

## 2017-01-01 RX ORDER — ENOXAPARIN SODIUM 100 MG/ML
40 INJECTION SUBCUTANEOUS EVERY 24 HOURS
Status: DISCONTINUED | OUTPATIENT
Start: 2017-01-01 | End: 2017-01-01 | Stop reason: HOSPADM

## 2017-01-01 RX ORDER — RISPERIDONE 1 MG/1
2 TABLET, FILM COATED ORAL EVERY EVENING
Status: DISCONTINUED | OUTPATIENT
Start: 2017-01-01 | End: 2017-01-01 | Stop reason: HOSPADM

## 2017-01-01 RX ORDER — DIPHENHYDRAMINE HYDROCHLORIDE 50 MG/ML
25 INJECTION, SOLUTION INTRAMUSCULAR; INTRAVENOUS
Status: COMPLETED | OUTPATIENT
Start: 2017-01-01 | End: 2017-01-01

## 2017-01-01 RX ORDER — INSULIN LISPRO 100 [IU]/ML
INJECTION, SOLUTION INTRAVENOUS; SUBCUTANEOUS
Status: DISCONTINUED | OUTPATIENT
Start: 2017-01-01 | End: 2017-01-01 | Stop reason: HOSPADM

## 2017-01-01 RX ORDER — CLINDAMYCIN HYDROCHLORIDE 300 MG/1
300 CAPSULE ORAL 3 TIMES DAILY
COMMUNITY
End: 2017-01-01

## 2017-01-01 RX ORDER — TRAMADOL HYDROCHLORIDE 50 MG/1
100 TABLET ORAL
COMMUNITY

## 2017-01-01 RX ORDER — NYSTATIN 100000 [USP'U]/ML
500000 SUSPENSION ORAL 4 TIMES DAILY
Status: DISCONTINUED | OUTPATIENT
Start: 2017-01-01 | End: 2017-01-01 | Stop reason: HOSPADM

## 2017-01-01 RX ORDER — DIPHENHYDRAMINE HYDROCHLORIDE 50 MG/ML
INJECTION, SOLUTION INTRAMUSCULAR; INTRAVENOUS
Status: COMPLETED
Start: 2017-01-01 | End: 2017-01-01

## 2017-01-01 RX ORDER — RISPERIDONE 1 MG/1
1 TABLET, FILM COATED ORAL
COMMUNITY

## 2017-01-01 RX ORDER — ACETAMINOPHEN 325 MG/1
650 TABLET ORAL
Status: DISCONTINUED | OUTPATIENT
Start: 2017-01-01 | End: 2017-01-01 | Stop reason: HOSPADM

## 2017-01-01 RX ORDER — GLUCOSAMINE/CHONDR SU A SOD 167-133 MG
500 CAPSULE ORAL
COMMUNITY

## 2017-01-01 RX ORDER — RISPERIDONE 1 MG/1
2 TABLET, FILM COATED ORAL
COMMUNITY

## 2017-01-01 RX ORDER — PREGABALIN 75 MG/1
150 CAPSULE ORAL 2 TIMES DAILY
Status: DISCONTINUED | OUTPATIENT
Start: 2017-01-01 | End: 2017-01-01 | Stop reason: HOSPADM

## 2017-01-01 RX ORDER — THERA TABS 400 MCG
1 TAB ORAL DAILY
Status: DISCONTINUED | OUTPATIENT
Start: 2017-01-01 | End: 2017-01-01 | Stop reason: HOSPADM

## 2017-01-01 RX ORDER — LORATADINE 10 MG/1
10 TABLET ORAL DAILY
COMMUNITY

## 2017-01-01 RX ADMIN — SODIUM CHLORIDE 1000 ML: 900 INJECTION, SOLUTION INTRAVENOUS at 15:19

## 2017-01-01 RX ADMIN — NYSTATIN 500000 UNITS: 500000 SUSPENSION ORAL at 09:42

## 2017-01-01 RX ADMIN — Medication 10 ML: at 05:40

## 2017-01-01 RX ADMIN — AMPICILLIN SODIUM AND SULBACTAM SODIUM 3 G: 2; 1 INJECTION, POWDER, FOR SOLUTION INTRAMUSCULAR; INTRAVENOUS at 08:17

## 2017-01-01 RX ADMIN — PREGABALIN 150 MG: 75 CAPSULE ORAL at 22:31

## 2017-01-01 RX ADMIN — ENOXAPARIN SODIUM 40 MG: 40 INJECTION SUBCUTANEOUS at 08:39

## 2017-01-01 RX ADMIN — Medication 10 ML: at 14:36

## 2017-01-01 RX ADMIN — PREGABALIN 50 MG: 50 CAPSULE ORAL at 11:00

## 2017-01-01 RX ADMIN — RISPERIDONE 1 MG: 1 TABLET, FILM COATED ORAL at 08:57

## 2017-01-01 RX ADMIN — SERTRALINE 150 MG: 50 TABLET, FILM COATED ORAL at 08:47

## 2017-01-01 RX ADMIN — PANTOPRAZOLE SODIUM 40 MG: 40 TABLET, DELAYED RELEASE ORAL at 09:42

## 2017-01-01 RX ADMIN — NYSTATIN 500000 UNITS: 500000 SUSPENSION ORAL at 22:32

## 2017-01-01 RX ADMIN — SIMVASTATIN 40 MG: 20 TABLET, FILM COATED ORAL at 08:48

## 2017-01-01 RX ADMIN — PANTOPRAZOLE SODIUM 40 MG: 40 TABLET, DELAYED RELEASE ORAL at 06:40

## 2017-01-01 RX ADMIN — SODIUM CHLORIDE 1500 ML: 900 INJECTION, SOLUTION INTRAVENOUS at 16:50

## 2017-01-01 RX ADMIN — ENOXAPARIN SODIUM 40 MG: 40 INJECTION SUBCUTANEOUS at 17:25

## 2017-01-01 RX ADMIN — ENOXAPARIN SODIUM 40 MG: 40 INJECTION SUBCUTANEOUS at 08:47

## 2017-01-01 RX ADMIN — Medication 10 ML: at 03:11

## 2017-01-01 RX ADMIN — INSULIN LISPRO 2 UNITS: 100 INJECTION, SOLUTION INTRAVENOUS; SUBCUTANEOUS at 12:19

## 2017-01-01 RX ADMIN — RISPERIDONE 1 MG: 1 TABLET, FILM COATED ORAL at 08:19

## 2017-01-01 RX ADMIN — RISPERIDONE 2 MG: 1 TABLET, FILM COATED ORAL at 17:29

## 2017-01-01 RX ADMIN — Medication 10 ML: at 15:09

## 2017-01-01 RX ADMIN — AMPICILLIN SODIUM AND SULBACTAM SODIUM 3 G: 2; 1 INJECTION, POWDER, FOR SOLUTION INTRAMUSCULAR; INTRAVENOUS at 14:35

## 2017-01-01 RX ADMIN — AMPICILLIN SODIUM AND SULBACTAM SODIUM 3 G: 2; 1 INJECTION, POWDER, FOR SOLUTION INTRAMUSCULAR; INTRAVENOUS at 03:24

## 2017-01-01 RX ADMIN — SIMVASTATIN 40 MG: 20 TABLET, FILM COATED ORAL at 22:32

## 2017-01-01 RX ADMIN — LORAZEPAM 0.5 MG: 0.5 TABLET ORAL at 17:25

## 2017-01-01 RX ADMIN — PREGABALIN 50 MG: 50 CAPSULE ORAL at 08:48

## 2017-01-01 RX ADMIN — THERA TABS 1 TABLET: TAB at 08:47

## 2017-01-01 RX ADMIN — LISINOPRIL 5 MG: 5 TABLET ORAL at 08:39

## 2017-01-01 RX ADMIN — LISINOPRIL 5 MG: 5 TABLET ORAL at 08:47

## 2017-01-01 RX ADMIN — ENOXAPARIN SODIUM 40 MG: 40 INJECTION SUBCUTANEOUS at 17:30

## 2017-01-01 RX ADMIN — SERTRALINE 150 MG: 50 TABLET, FILM COATED ORAL at 08:19

## 2017-01-01 RX ADMIN — NYSTATIN 500000 UNITS: 500000 SUSPENSION ORAL at 08:19

## 2017-01-01 RX ADMIN — DIPHENHYDRAMINE HYDROCHLORIDE 50 MG: 25 CAPSULE ORAL at 21:22

## 2017-01-01 RX ADMIN — SERTRALINE 150 MG: 50 TABLET, FILM COATED ORAL at 09:42

## 2017-01-01 RX ADMIN — DIPHENHYDRAMINE HYDROCHLORIDE 50 MG: 25 CAPSULE ORAL at 15:07

## 2017-01-01 RX ADMIN — AMPICILLIN SODIUM AND SULBACTAM SODIUM 3 G: 2; 1 INJECTION, POWDER, FOR SOLUTION INTRAMUSCULAR; INTRAVENOUS at 04:06

## 2017-01-01 RX ADMIN — PREGABALIN 150 MG: 75 CAPSULE ORAL at 08:42

## 2017-01-01 RX ADMIN — MELATONIN TAB 3 MG 3 MG: 3 TAB at 21:51

## 2017-01-01 RX ADMIN — TRAZODONE HYDROCHLORIDE 50 MG: 50 TABLET ORAL at 21:03

## 2017-01-01 RX ADMIN — PREGABALIN 150 MG: 75 CAPSULE ORAL at 17:25

## 2017-01-01 RX ADMIN — SODIUM CHLORIDE 100 ML/HR: 900 INJECTION, SOLUTION INTRAVENOUS at 17:24

## 2017-01-01 RX ADMIN — TRIAMCINOLONE ACETONIDE: 1 CREAM TOPICAL at 08:39

## 2017-01-01 RX ADMIN — LATANOPROST 1 DROP: 50 SOLUTION OPHTHALMIC at 22:00

## 2017-01-01 RX ADMIN — DIPHENHYDRAMINE HYDROCHLORIDE 50 MG: 25 CAPSULE ORAL at 21:03

## 2017-01-01 RX ADMIN — SERTRALINE 150 MG: 50 TABLET, FILM COATED ORAL at 08:42

## 2017-01-01 RX ADMIN — PREGABALIN 50 MG: 50 CAPSULE ORAL at 21:03

## 2017-01-01 RX ADMIN — CEFEPIME 2 G: 2 INJECTION, POWDER, FOR SOLUTION INTRAVENOUS at 17:26

## 2017-01-01 RX ADMIN — AMPICILLIN SODIUM AND SULBACTAM SODIUM 3 G: 2; 1 INJECTION, POWDER, FOR SOLUTION INTRAMUSCULAR; INTRAVENOUS at 03:10

## 2017-01-01 RX ADMIN — NYSTATIN 500000 UNITS: 500000 SUSPENSION ORAL at 20:11

## 2017-01-01 RX ADMIN — DONEPEZIL HYDROCHLORIDE 10 MG: 5 TABLET, FILM COATED ORAL at 17:29

## 2017-01-01 RX ADMIN — IOPAMIDOL 96 ML: 755 INJECTION, SOLUTION INTRAVENOUS at 19:01

## 2017-01-01 RX ADMIN — PREGABALIN 150 MG: 75 CAPSULE ORAL at 17:29

## 2017-01-01 RX ADMIN — DIPHENHYDRAMINE HYDROCHLORIDE 50 MG: 25 CAPSULE ORAL at 04:01

## 2017-01-01 RX ADMIN — Medication 10 ML: at 13:25

## 2017-01-01 RX ADMIN — Medication 10 ML: at 20:12

## 2017-01-01 RX ADMIN — SODIUM CHLORIDE 100 ML/HR: 900 INJECTION, SOLUTION INTRAVENOUS at 20:13

## 2017-01-01 RX ADMIN — AMPICILLIN SODIUM AND SULBACTAM SODIUM 3 G: 2; 1 INJECTION, POWDER, FOR SOLUTION INTRAMUSCULAR; INTRAVENOUS at 21:50

## 2017-01-01 RX ADMIN — DIPHENHYDRAMINE HYDROCHLORIDE 50 MG: 25 CAPSULE ORAL at 02:12

## 2017-01-01 RX ADMIN — Medication 10 ML: at 06:40

## 2017-01-01 RX ADMIN — SODIUM CHLORIDE 100 ML/HR: 900 INJECTION, SOLUTION INTRAVENOUS at 14:38

## 2017-01-01 RX ADMIN — VITAMIN D, TAB 1000IU (100/BT) 1000 UNITS: 25 TAB at 08:48

## 2017-01-01 RX ADMIN — SIMVASTATIN 40 MG: 20 TABLET, FILM COATED ORAL at 21:51

## 2017-01-01 RX ADMIN — PREDNISONE 60 MG: 20 TABLET ORAL at 08:48

## 2017-01-01 RX ADMIN — PREGABALIN 50 MG: 50 CAPSULE ORAL at 15:07

## 2017-01-01 RX ADMIN — RISPERIDONE 2 MG: 1 TABLET, FILM COATED ORAL at 17:25

## 2017-01-01 RX ADMIN — FAMOTIDINE 20 MG: 10 INJECTION, SOLUTION INTRAVENOUS at 15:42

## 2017-01-01 RX ADMIN — LORAZEPAM 0.5 MG: 0.5 TABLET ORAL at 17:29

## 2017-01-01 RX ADMIN — SIMVASTATIN 40 MG: 20 TABLET, FILM COATED ORAL at 20:10

## 2017-01-01 RX ADMIN — TRIAMCINOLONE ACETONIDE: 1 CREAM TOPICAL at 09:00

## 2017-01-01 RX ADMIN — NYSTATIN 500000 UNITS: 500000 SUSPENSION ORAL at 13:25

## 2017-01-01 RX ADMIN — DONEPEZIL HYDROCHLORIDE 10 MG: 5 TABLET, FILM COATED ORAL at 17:25

## 2017-01-01 RX ADMIN — RISPERIDONE 1 MG: 1 TABLET, FILM COATED ORAL at 08:42

## 2017-01-01 RX ADMIN — AMPICILLIN SODIUM AND SULBACTAM SODIUM 3 G: 2; 1 INJECTION, POWDER, FOR SOLUTION INTRAMUSCULAR; INTRAVENOUS at 13:25

## 2017-01-01 RX ADMIN — RISPERIDONE 2 MG: 1 TABLET, FILM COATED ORAL at 17:51

## 2017-01-01 RX ADMIN — RISPERIDONE 1 MG: 1 TABLET, FILM COATED ORAL at 09:42

## 2017-01-01 RX ADMIN — DIPHENHYDRAMINE HYDROCHLORIDE 50 MG: 25 CAPSULE ORAL at 08:39

## 2017-01-01 RX ADMIN — LATANOPROST 1 DROP: 50 SOLUTION OPHTHALMIC at 20:29

## 2017-01-01 RX ADMIN — ENOXAPARIN SODIUM 40 MG: 40 INJECTION SUBCUTANEOUS at 22:28

## 2017-01-01 RX ADMIN — DONEPEZIL HYDROCHLORIDE 10 MG: 5 TABLET, FILM COATED ORAL at 08:47

## 2017-01-01 RX ADMIN — INSULIN LISPRO 2 UNITS: 100 INJECTION, SOLUTION INTRAVENOUS; SUBCUTANEOUS at 11:32

## 2017-01-01 RX ADMIN — ATENOLOL 50 MG: 50 TABLET ORAL at 08:47

## 2017-01-01 RX ADMIN — AMPICILLIN SODIUM AND SULBACTAM SODIUM 3 G: 2; 1 INJECTION, POWDER, FOR SOLUTION INTRAMUSCULAR; INTRAVENOUS at 20:11

## 2017-01-01 RX ADMIN — METHYLPREDNISOLONE SODIUM SUCCINATE 125 MG: 125 INJECTION, POWDER, FOR SOLUTION INTRAMUSCULAR; INTRAVENOUS at 15:44

## 2017-01-01 RX ADMIN — PANTOPRAZOLE SODIUM 40 MG: 40 TABLET, DELAYED RELEASE ORAL at 08:19

## 2017-01-01 RX ADMIN — DIPHENHYDRAMINE HYDROCHLORIDE: 50 INJECTION, SOLUTION INTRAMUSCULAR; INTRAVENOUS at 15:42

## 2017-01-01 RX ADMIN — LORAZEPAM 0.5 MG: 0.5 TABLET ORAL at 17:51

## 2017-01-01 RX ADMIN — NYSTATIN 500000 UNITS: 500000 SUSPENSION ORAL at 14:36

## 2017-01-01 RX ADMIN — LATANOPROST 1 DROP: 50 SOLUTION OPHTHALMIC at 22:30

## 2017-01-01 RX ADMIN — RISPERIDONE 2 MG: 1 TABLET, FILM COATED ORAL at 22:32

## 2017-01-01 RX ADMIN — LATANOPROST 1 DROP: 50 SOLUTION OPHTHALMIC at 21:04

## 2017-01-01 RX ADMIN — NYSTATIN 500000 UNITS: 500000 SUSPENSION ORAL at 21:51

## 2017-01-01 RX ADMIN — PANTOPRAZOLE SODIUM 40 MG: 40 TABLET, DELAYED RELEASE ORAL at 05:40

## 2017-01-01 RX ADMIN — TRIAMCINOLONE ACETONIDE: 1 CREAM TOPICAL at 21:09

## 2017-01-01 RX ADMIN — Medication 10 ML: at 21:51

## 2017-01-01 RX ADMIN — NYSTATIN 500000 UNITS: 500000 SUSPENSION ORAL at 17:30

## 2017-01-01 RX ADMIN — AMPICILLIN SODIUM AND SULBACTAM SODIUM 3 G: 2; 1 INJECTION, POWDER, FOR SOLUTION INTRAMUSCULAR; INTRAVENOUS at 08:42

## 2017-01-01 RX ADMIN — Medication 10 ML: at 21:04

## 2017-01-01 RX ADMIN — PREGABALIN 150 MG: 75 CAPSULE ORAL at 08:19

## 2017-01-01 RX ADMIN — AMPICILLIN SODIUM AND SULBACTAM SODIUM 3 G: 2; 1 INJECTION, POWDER, FOR SOLUTION INTRAMUSCULAR; INTRAVENOUS at 09:42

## 2017-01-01 RX ADMIN — TRIAMCINOLONE ACETONIDE: 1 CREAM TOPICAL at 22:22

## 2017-01-01 RX ADMIN — MELATONIN TAB 3 MG 3 MG: 3 TAB at 20:13

## 2017-01-01 RX ADMIN — NYSTATIN 500000 UNITS: 500000 SUSPENSION ORAL at 08:43

## 2017-01-01 RX ADMIN — PANTOPRAZOLE SODIUM 40 MG: 40 TABLET, DELAYED RELEASE ORAL at 08:42

## 2017-01-01 RX ADMIN — ATENOLOL 50 MG: 50 TABLET ORAL at 08:39

## 2017-01-01 RX ADMIN — LEVOFLOXACIN 750 MG: 5 INJECTION, SOLUTION INTRAVENOUS at 18:16

## 2017-01-01 RX ADMIN — PREGABALIN 150 MG: 75 CAPSULE ORAL at 09:42

## 2017-01-01 RX ADMIN — PREDNISONE 60 MG: 20 TABLET ORAL at 08:39

## 2017-01-01 RX ADMIN — DIPHENHYDRAMINE HYDROCHLORIDE 50 MG: 25 CAPSULE ORAL at 08:47

## 2017-01-01 RX ADMIN — NYSTATIN 500000 UNITS: 500000 SUSPENSION ORAL at 17:25

## 2017-01-01 RX ADMIN — AMPICILLIN SODIUM AND SULBACTAM SODIUM 3 G: 2; 1 INJECTION, POWDER, FOR SOLUTION INTRAMUSCULAR; INTRAVENOUS at 22:25

## 2017-01-30 NOTE — MR AVS SNAPSHOT
Visit Information Date & Time Provider Department Dept. Phone Encounter #  
 1/30/2017  3:00 PM Jaky Griffiths MD  [facility] [id number] Follow-up Instructions Return in about 1 week (around 2/6/2017). Your Appointments 2/15/2017 10:40 AM  
Follow Up with Hank Beyer MD  
[facility]) Appt Note: memory loss at[address] [place] [id number] [phone]  
  
   
 [address] [facility] [id number]  
  
    
 3/20/2017  3:05 PM  
Medicare Physical with Karla Sharp MD  
[address] [facility] ([facility]) Appt Note: Due for MW ; Due for [address] [address]  
 [address] [address]  
[phone]  
  
   
 [address] [address]  
  
    
 4/4/2017 10:30 AM  
ROUTINE CARE with Karla Sharp MD  
[address] [place] [address] [facility]: 3 mo Diabetes [address] [address]  
[place] [address] [address] Upcoming Health Maintenance Date Due  
 MEDICARE YEARLY EXAM 1/7/2017 HEMOGLOBIN A1C Q6M 6/2/2017 FOOT EXAM Q1 6/22/2017 EYE EXAM RETINAL OR DILATED Q1 8/18/2017 MICROALBUMIN Q1 12/2/2017 LIPID PANEL Q1 12/2/2017 GLAUCOMA SCREENING Q2Y 8/18/2018 COLONOSCOPY 1/21/2020 DTaP/Tdap/Td series (2 - Td) 11/7/2021 Allergies as of 1/30/2017  Review Complete On: 1/30/2017 By: Gaylon Saint Severity Noted Reaction Type Reactions Morphine High 08/18/2015   Topical Rash Erythromycin Medium 02/24/2010   Intolerance Nausea and Vomiting  
 Sulfa (Sulfonamide Antibiotics) Medium 02/24/2010   Not Verified Rash Adhesive Tape-silicones  66/06/8739    Hives Cinnamon  05/28/2014   Systemic Nausea Only Codeine  02/24/2010   Side Effect Other (comments)  
 hallucinations Current Immunizations  Reviewed on 12/2/2016 Name Date Influenza Vaccine 9/15/2016, 9/17/2015, 9/18/2014, 10/17/2013 Influenza Vaccine Split 10/11/2012, 10/5/2009 Influenza Vaccine Whole 9/21/2011, 9/28/2010 Pneumococcal Conjugate (PCV-13) 3/15/2016 Pneumococcal Polysaccharide (PPSV-23) 12/13/2012 Pneumococcal Vaccine (Pcv) 11/24/2008 TDAP Vaccine 11/7/2011 Zoster Vaccine, Live 9/18/2014 Not reviewed this visit You Were Diagnosed With   
  
 Codes Comments Edema of both feet    -  Primary ICD-10-CM: R60.0 ICD-9-CM: 583. 3 Poor circulation of extremity (HCC)     ICD-10-CM: I73.9 ICD-9-CM: 443. 9 Vitals BP Pulse Temp Resp Height(growth percentile) Weight(growth percentile) 114/43 60 97.7 °F (36.5 °C) (Oral) 20 5' 4.5\" (1.638 m) 172 lb 3.2 oz (78.1 kg) SpO2 BMI Smoking Status 98% 29.1 kg/m2 Never Smoker Vitals History BMI and BSA Data Body Mass Index Body Surface Area  
 29.1 kg/m 2 1.89 m 2 Preferred Pharmacy Pharmacy Name Phone [facility] [address] [place] [address] [phone] Your Updated Medication List  
  
   
This list is accurate as of: 1/30/17  4:00 PM.  Always use your most recent med list.  
  
  
  
  
 ALEVE 220 mg tablet Generic drug:  naproxen sodium Take 220 mg by mouth as needed. aspirin delayed-release 81 mg tablet Take 1 Tab by mouth daily. atenolol 50 mg tablet Commonly known as:  TENORMIN Take 1 Tab by mouth daily. CENTRUM SILVER Tab tablet Generic drug:  multivitamins-minerals-lutein Take 1 Tab by mouth daily. clindamycin 300 mg capsule Commonly known as:  CLEOCIN Take 300 mg by mouth three (3) times daily. docusate sodium 100 mg capsule Commonly known as:  Monica Mule Take 100 mg by mouth as needed. donepezil 10 mg tablet Commonly known as:  ARICEPT Take 1 Tab by mouth daily. FISH OIL 1,000 mg Cap Generic drug:  omega-3 fatty acids-vitamin e Take 1 Cap by mouth daily. Indications: 1400 mg daily  
  
 hydroCHLOROthiazide 25 mg tablet Commonly known as:  HYDRODIURIL Take 1 Tab by mouth daily. HYDROcodone-acetaminophen 5-325 mg per tablet Commonly known as:  Rima Rodolfo Take  by mouth.  
  
 latanoprost 0.005 % ophthalmic solution Commonly known as:  Matthew Pry Administer 1 Drop to right eye nightly. lisinopril 5 mg tablet Commonly known as:  Wang Chika Take 1 Tab by mouth daily. loratadine 10 mg tablet Commonly known as:  Kathlyne Old Take 1 Tab by mouth daily. LORazepam 0.5 mg tablet Commonly known as:  ATIVAN Take 1 Tab by mouth daily. Max Daily Amount: 0.5 mg.  
  
 LUTEIN-ZEAXANTHIN PO Take  by mouth daily. meclizine 12.5 mg tablet Commonly known as:  ANTIVERT Take 1 Tab by mouth three (3) times daily as needed. MILK OF MAGNESIA 400 mg/5 mL suspension Generic drug:  magnesium hydroxide Take 30 mL by mouth as needed. NIACIN FLUSH FREE PO Take 500 mg by mouth daily. omeprazole 40 mg capsule Commonly known as:  PRILOSEC  
TAKE ONE CAPSULE BY MOUTH ONCE DAILY FOR ACID REFLUX  
  
 risperiDONE 1 mg tablet Commonly known as:  RisperDAL Titrate as directed to 1 in am and 2 in pm  
  
 sertraline 100 mg tablet Commonly known as:  ZOLOFT Take 1.5 Tabs by mouth daily. simvastatin 40 mg tablet Commonly known as:  ZOCOR  
TAKE 1 TABLET BY MOUTH NIGHTLY  
  
 traMADol 50 mg tablet Commonly known as:  ULTRAM  
TAKE ONE TABLET BY MOUTH EVERY 8 HOURS AS NEEDED FOR PAIN MAX  DAILY  AMOUNT  150MG  
  
 traZODone 50 mg tablet Commonly known as:  DESYREL  
TAKE 1 TABLET BY MOUTH NIGHTLY FOR SLEEP  
  
 VITAMIN D3 1,000 unit tablet Generic drug:  cholecalciferol Take 1,000 Units by mouth daily.  Indications: VITAMIN D DEFICIENCY  
  
  
  
 Prescriptions Printed Refills  
 traMADol (ULTRAM) 50 mg tablet 0 Sig: TAKE ONE TABLET BY MOUTH EVERY 8 HOURS AS NEEDED FOR PAIN MAX  DAILY  AMOUNT  150MG Class: Print We Performed the Following REFERRAL TO VASCULAR SURGERY [KSB782 Custom] Comments:  
 Please evaluate patient for b/l cold, painful feet. Arterial and venous duplex studies ordered. Follow-up Instructions Return in about 1 week (around 2/6/2017). To-Do List   
 01/30/2017 Imaging:  DUPLEX LOWER EXT ARTERY BILAT   
  
 01/30/2017 Imaging:  DUPLEX LOWER EXT VENOUS BILAT Referral Information Referral ID Referred By Referred To  
  
 [id number] Godwin PEREZ Not Available Visits Status Start Date End Date 1 New Request 1/30/17 1/30/18 If your referral has a status of pending review or denied, additional information will be sent to support the outcome of this decision. Please provide this summary of care documentation to your next provider. Your primary care clinician is listed as [address]. If you have any questions after today's visit, please call [phone].

## 2017-01-30 NOTE — PROGRESS NOTES
NNTOCED CJW 1/28/2017 Acute diabetic foot ulcer of right foot    [id number] [facility] Follow Up for ED Admission from 1/28/2017 - 1/28/2017 for diabetic foot ulcer. RRAT score: ED admission     NN met with patient and wife during PCP visit. Please note functional assessment which was done with patients wife due to dx of alzheimer's. Wife denies patient is having fever, chills, nausea, vomiting, chest pain, sob or inability to take medications. NN discussed red flags with wife and she verbalized understanding of when to seek immediate medical attention. Red flags/sepsis: fever or below normal temperature (97f), chills, nausea, vomiting, diarrhea, chest pain, shortness of breath, unable to take medications, unusual sensations, and BFAST. Wife presented with log of vitals as well as glucose readings for the month of January. Patient was taken of Metformin in December and has been controlling his diabetes with diet. Last A1C was 6.3 in December 2016. NN spoke with provider about home health referral for wound care and PT. Records retrieved and given to provider for review. Advance Medical Directive on file in EMR?  yes has an advanced directive - a copy has been provided    Medical History:     Past Medical History   Diagnosis Date    Anxiety     DDD (degenerative disc disease), cervical     DDD (degenerative disc disease), lumbar     Diabetes ([address]     Falls     Fatigue     GERD (gastroesophageal reflux disease)     Glaucoma     Glaucoma      right eye    Hearing loss     Horseshoe kidney     Hypertension     Irritable bowel syndrome     Osteoarthrosis, unspecified whether generalized or localized, other specified sites     Pure hypercholesterolemia     Ringing in ears     Severe single current episode of major depressive disorder, with psychotic features ([address] 6/22/2016    Snoring     Spinal stenosis, unspecified region other than cervical     Vertigo      This represents Transitions of Care b/c NN spoke with patient and/or caregiver within 1 business days of discharge. Pt's TCM follow up appt is scheduled with Dr. Blanka Robles on 1/30/2017, which is within 2 days of discharge. Fall Risk Assessment:    Fall Risk Assessment, last 12 mths 6/22/2016   Able to walk? Yes   Fall in past 12 months? Yes   Fall with injury? Yes   Number of falls in past 12 months 3   Fall Risk Score 4     Patient presenting symptoms (referenced by Estrella GARCIA): presents to ED c/o a blister that popped on his right heel since 2 weeks ago. Pt's wife says that his blister popped and his sore has grown larger instead of healing. Pt also reports another spot of redness on  His mid right foot. Pt's wife notes that pt has had decreased circulation for 1 month and his feet and toes are turning more blue. Pt is not on DM medication and is controlling sugar with diet. Pt denies fever, chills, chest pain, or SOB. Course of current Hospitalization (referenced by Estrella GARCIA note dated 1/28/2017): Counseled regarding diagnosis, lab results, imaging studies, prescriptions, need for follow-up. When to return to ED, vascular follow, and wound care, patient already has appointment with Dr. Blanka Robles in [place], on Monday. Lab/Diagnostics at time of Discharge:   Sodium 141, potassium 4.5, chloride 104, CO2 30.8, anion gap 6.2, BUN 18, creatinine 0.99, glucose 106, calcium 8.8, AST 31, ALT 19, alk phos 98, tot protein 6.6, albumin 3.4  WBC 13.31, RBC 4.11, Hgb 12.6, Hct 37.5, MCV 91.2, MCH 30.7, MCHC 33.6, plt 218, MPV 11.2    Negative wound cultures    Medication Reconciliation completed: by LPN during visit     Support System consists of: wife and adult children    [facility], if so what agency? no    NN remains available to patient and family.

## 2017-01-31 NOTE — PROCEDURES
[address]  *** FINAL REPORT ***    Name: Matthew Robles  MRN: [id number]    Outpatient  : 01 Sep 1945  HIS Order #: [id number]  [id number] [facility] Visit #: [id number]  Date: 2017    TYPE OF TEST: Peripheral Venous Testing    REASON FOR TEST  Limb swelling    Right Leg:-  Deep venous thrombosis:           No  Superficial venous thrombosis:    No  Deep venous insufficiency:        No  Superficial venous insufficiency: No    Left Leg:-  Deep venous thrombosis:           No  Superficial venous thrombosis:    No  Deep venous insufficiency:        No  Superficial venous insufficiency: No      INTERPRETATION/FINDINGS  PROCEDURE:  BILATERAL LE VENOUS DUPLEX. Evaluation of lower extremity veins with ultrasound (B-mode imaging,  pulsed Doppler, color Doppler). Includes the common femoral, deep  femoral, femoral, popliteal, posterior tibial, peroneal, and great  saphenous veins. FINDINGS:  Tomeka Malling scale and color flow duplex images of the veins in  both lower extremities demonstrate normal compressibility, spontaneous   and augmented flow profiles, and absence of filling defects  throughout the deep and superficial veins in both lower extremities. CONCLUSION:  Bilateral lower extremity venous duplex negative for deep   venous thrombosis or thrombophlebitis. Multiple prominent lymph nodes   noted in he groin bilaterally, the largest measuring 1.85 x 1.18 cm  in the right and 2.42 x 0.90 cm in the left. ADDITIONAL COMMENTS    I have personally reviewed the data relevant to the interpretation of  this  study. TECHNOLOGIST: Em Jenkins RDCS  Signed: 2017 1:55:52 PM    PHYSICIAN: Jennifer Castillo.  Naye Stevenson MD  Signed: 2017 8:32:01 AM

## 2017-01-31 NOTE — PROGRESS NOTES
Patient: Alice Frazier MRN: [id number]  SSN: [id number]    YOB: 1945  Age: 66 y.o. Sex: male        Subjective:     Chief Complaint   Patient presents with    Foot Ulcer    ED Follow-up       HPI: he is a 66y.o. year old male who presents with wife for heel ulcer. Patient seen in stand alone ED for ulcer and cold, edematous, blue b/l feet. Started on clindamycin. Had xray of foot. No doppler or LEAH done. Patient with hx of DM2, HTN and HLD which have been well controlled. Has hx of dementia and chronic pain. Patient denies HA, dizziness, SOB, CP, abdominal pain or dysuria. BP Readings from Last 3 Encounters:   01/30/17 114/43   12/02/16 106/59   11/23/16 120/78       Wt Readings from Last 3 Encounters:   01/30/17 172 lb 3.2 oz (78.1 kg)   12/02/16 177 lb (80.3 kg)   11/23/16 177 lb (80.3 kg)     Body mass index is 29.1 kg/(m^2). Current and past medical information:    Current Medications after this visit[de-identified]   Current Outpatient Prescriptions   Medication Sig    traMADol (ULTRAM) 50 mg tablet TAKE ONE TABLET BY MOUTH EVERY 8 HOURS AS NEEDED FOR PAIN MAX  DAILY  AMOUNT  150MG    clindamycin (CLEOCIN) 300 mg capsule Take 300 mg by mouth three (3) times daily.  HYDROcodone-acetaminophen (NORCO) 5-325 mg per tablet Take  by mouth.  lisinopril (PRINIVIL, ZESTRIL) 5 mg tablet Take 1 Tab by mouth daily.  meclizine (ANTIVERT) 12.5 mg tablet Take 1 Tab by mouth three (3) times daily as needed.  LORazepam (ATIVAN) 0.5 mg tablet Take 1 Tab by mouth daily. Max Daily Amount: 0.5 mg.    traZODone (DESYREL) 50 mg tablet TAKE 1 TABLET BY MOUTH NIGHTLY FOR SLEEP    risperiDONE (RISPERDAL) 1 mg tablet Titrate as directed to 1 in am and 2 in pm    sertraline (ZOLOFT) 100 mg tablet Take 1.5 Tabs by mouth daily.  simvastatin (ZOCOR) 40 mg tablet TAKE 1 TABLET BY MOUTH NIGHTLY    hydrochlorothiazide (HYDRODIURIL) 25 mg tablet Take 1 Tab by mouth daily.     atenolol (TENORMIN) 50 mg tablet Take 1 Tab by mouth daily.  donepezil (ARICEPT) 10 mg tablet Take 1 Tab by mouth daily.  omeprazole (PRILOSEC) 40 mg capsule TAKE ONE CAPSULE BY MOUTH ONCE DAILY FOR ACID REFLUX    magnesium hydroxide (MILK OF MAGNESIA) 400 mg/5 mL suspension Take 30 mL by mouth as needed.  latanoprost (XALATAN) 0.005 % ophthalmic solution Administer 1 Drop to right eye nightly.  docusate sodium (COLACE) 100 mg capsule Take 100 mg by mouth as needed.  aspirin delayed-release 81 mg tablet Take 1 Tab by mouth daily.  loratadine (CLARITIN) 10 mg tablet Take 1 Tab by mouth daily.  NIACIN, INOSITOL NIACINATE, (NIACIN FLUSH FREE PO) Take 500 mg by mouth daily.  LUTEIN EXTRACT/ZEAXANTH XT (LUTEIN-ZEAXANTHIN PO) Take  by mouth daily.  naproxen sodium (ALEVE) 220 mg tablet Take 220 mg by mouth as needed.  multivitamins-minerals-lutein (CENTRUM SILVER) Tab Take 1 Tab by mouth daily.  cholecalciferol, vitamin d3, (VITAMIN D) 1,000 unit tablet Take 1,000 Units by mouth daily. Indications: VITAMIN D DEFICIENCY    omega-3 fatty acids-vitamin e (FISH OIL) 1,000 mg Cap Take 1 Cap by mouth daily. Indications: 1400 mg daily     No current facility-administered medications for this visit.         Patient Active Problem List    Diagnosis Date Noted    Dementia without behavioral disturbance 06/22/2016    Severe single current episode of major depressive disorder, with psychotic features ([address] 06/22/2016    Diabetes mellitus type 2 with complications ([facility] 11/50/0507    Late onset Alzheimer's disease with behavioral disturbance 03/01/2016    Anxiety 05/08/2015    S/P colonoscopy 05/28/2014    Arthritis of neck 05/16/2013    Glaucoma 03/29/2011    Positional vertigo 03/29/2011    Tinnitus 03/29/2011    Hearing loss, sensorineural 03/29/2011    Gastritis 09/30/2010    Essential hypertension, benign 02/24/2010     Class: Chronic    Spinal stenosis, unspecified region other than cervical 02/24/2010     Class: Chronic    Horseshoe kidney 02/24/2010     Class: Chronic    Asplenia 02/24/2010     Class: Chronic    Osteoarthrosis, unspecified whether generalized or localized, other specified sites 02/24/2010     Class: Chronic    Type II or unspecified type diabetes mellitus without mention of complication, not stated as uncontrolled 02/24/2010     Class: Chronic    Irritable bowel syndrome 02/24/2010     Class: Chronic    Pure hypercholesterolemia      Class: Chronic       Past Medical History   Diagnosis Date    Anxiety     DDD (degenerative disc disease), cervical     DDD (degenerative disc disease), lumbar     Diabetes ([address]     Falls     Fatigue     GERD (gastroesophageal reflux disease)     Glaucoma     Glaucoma      right eye    Hearing loss     Horseshoe kidney     Hypertension     Irritable bowel syndrome     Osteoarthrosis, unspecified whether generalized or localized, other specified sites     Pure hypercholesterolemia     Ringing in ears     Severe single current episode of major depressive disorder, with psychotic features ([address] 6/22/2016    Snoring     Spinal stenosis, unspecified region other than cervical     Vertigo        Allergies   Allergen Reactions    Morphine Rash    Erythromycin Nausea and Vomiting    Sulfa (Sulfonamide Antibiotics) Rash    Adhesive Tape-Silicones Hives    Cinnamon Nausea Only    Codeine Other (comments)     hallucinations       Past Surgical History   Procedure Laterality Date    Hx splenectomy  1963    Pr appendectomy      Hx hernia repair  left inguinal    Pr laminectomy,lumbar  1992    Hx cholecystectomy  3/10    Hx orthopaedic       lumbar decompression    Hx heent      Amb poc eeg / sleep deprived      Hx splenectomy         Social History     Social History    Marital status:      Spouse name: N/A    Number of children: N/A    Years of education: N/A     Social History Main Topics    Smoking status: Never Smoker    Smokeless tobacco: Never Used    Alcohol use No    Drug use: No    Sexual activity: Yes     Partners: Female     Birth control/ protection: None     Other Topics Concern    None     Social History Narrative         Objective:     Review of Systems:  Constitutional: Negative for fatigue or malaise  Derm: see HPI  Cardiovascular: Negative for dizziness, chest pain or palpitations  Respiratory: Negative for cough, wheezing or SOB  Muscoloskeletal: Negative for acute myalgias or arthralgias   Neurological: Negative for headache, weakness or paresthesia  Psychological: see HPI      Vitals:    01/30/17 1453   BP: 114/43   Pulse: 60   Resp: 20   Temp: 97.7 °F (36.5 °C)   TempSrc: Oral   SpO2: 98%   Weight: 172 lb 3.2 oz (78.1 kg)   Height: 5' 4.5\" (1.638 m)      Body mass index is 29.1 kg/(m^2). Physical Exam:  Constitutional: well developed, well nourished, appears uncomfortable  Skin: right lateral heel ulcer, b/l cold, blue feet  Head: normocephalic, atraumatic  Eyes: sclera clear, EOMI  Neck: normal range of motion  Cardiovascular: normal S1, S2, regular rate and rhythm, 2+ distal pulses  Respiratory: clear to auscultation bilaterally with symmetrical effort  Extremities: b/l pedal edema, full range of motion  Neurology: normal strength and sensation  Psych: active, alert, affect appropriate         Assessment and orders:       ICD-10-CM ICD-9-CM    1. Edema of both feet R60.0 782.3 DUPLEX LOWER EXT VENOUS BILAT      DUPLEX LOWER EXT ARTERY BILAT      REFERRAL TO VASCULAR SURGERY   2. Poor circulation of extremity ([facility]) I73.9 443.9 DUPLEX LOWER EXT VENOUS BILAT      DUPLEX LOWER EXT ARTERY BILAT      REFERRAL TO VASCULAR SURGERY   3. Ulcer of foot, right, limited to breakdown of skin ([address] L97.511 707.15    4. Controlled type 2 diabetes mellitus with foot ulcer, without long-term current use of insulin ([facility]) E11.621 250.80     L97.509 707.15          Plan of care:  Diagnoses were discussed in detail with patient. Medication risks/benefits/side effects discussed with patient. All of the patient's questions were addressed and answered to apparent satisfaction. The patient understands and agrees with our plan of care. The patient knows to call back if they have questions about the plan of care or if symptoms change. The patient received an After-Visit Summary which contains VS, diagnoses, orders, allergy and medication lists. Patient Care Team:  Judy Lopez MD as PCP - General  Randee Sutherland, RN as Nurse Navigator (Family Practice)  Brianne Hough MD (Neurology)  Marita Franz PsyD (Psychology)  Eliz Dias, ISAÍAS as Ambulatory Care Navigator    Follow-up Disposition:  Return in about 1 week (around 2/6/2017).     Future Appointments  Date Time Provider Leatha Winston   1/31/2017 1:00 PM VAS ROOM 1 [facility]   2/1/2017 3:00 PM [facility]   2/7/2017 2:45 PM Judy Lopez MD [facility] CARITO SCHED   2/15/2017 10:40 AM Brianne Hough MD Neuro CARITO SCHED   3/20/2017 3:05 PM MD BUSHRA Early CARITO SCHED   4/4/2017 10:30 AM [facility]       Signed By: Jabier Garcia MD     January 30, 2017

## 2017-02-01 NOTE — PROCEDURES
[address]  *** FINAL REPORT ***    Name: Dionna Aleman  MRN: [id number]    Outpatient  : 01 Sep 1945  HIS Order #: [id number]  [id number] [facility] Visit #: [id number]  Date: 2017    TYPE OF TEST: Peripheral Arterial Testing    REASON FOR TEST  Rest pain (both sides), Cold sensitivity    Right Leg  Segmentals: Normal                     mmHg  Brachial          98  High thigh  Low thigh        135  Calf             134  Posterior tibial 138  Dorsalis pedis   126  Peroneal  Metatarsal  Toe pressure     112  Doppler:  PVR:        Normal  Ankle/Brachial: 1.28    Left Leg  Segmentals: Normal                     mmHg  Brachial         108  High thigh  Low thigh        133  Calf             141  Posterior tibial 129  Dorsalis pedis   122  Peroneal  Metatarsal  Toe pressure     112  Doppler:  PVR:        Normal  Ankle/Brachial: 1.19    INTERPRETATION/FINDINGS  PROCEDURE:  Evaluation of lower extremity arteries with multilevel  systolic blood pressure measurements and pulse volume recording (PVR)  plethysmography. Includes calculation of the ankle/brachial pressure  indices (LEAH's). IMPRESSION:  1. No evidence of significant peripheral arterial disease at rest in  the right leg. 2. No evidence of significant peripheral arterial disease at rest in  the left leg. 3. The right ankle/brachial index is 1.28 and the left ankle/brachial  index is 1.19.  4. The right toe/brachial index is 1.04 and the left toe/brachial  index is 1.04.    ADDITIONAL COMMENTS    I have personally reviewed the data relevant to the interpretation of  this  study. TECHNOLOGIST: Hay Dominguez RDCS  Signed: 2017 05:08 PM    PHYSICIAN: Ct Romero.  Ally Kebede MD  Signed: 2017 08:24 AM

## 2017-02-10 NOTE — MR AVS SNAPSHOT
Visit Information Date & Time Provider Department Dept. Phone Encounter #  
 2/10/2017  2:45 PM Elmo Pena MD [address] [id number] Follow-up Instructions Return in about 2 months (around 4/10/2017). Your Appointments 2/15/2017 10:40 AM  
Follow Up with Leilani Davalos MD  
[facility]) Appt Note: memory loss atilio Hammonds [address] On license of [facility] 99 [id number] [phone] [address] [address]  
  
    
 3/20/2017  3:05 PM  
Medicare Physical with Elmo Pena MD  
[facility] ([address]) Appt Note: Due for MW ; Due for [address]  
 [address] [address]  
[phone]  
  
   
 [address] [address]  
  
    
 4/4/2017 10:30 AM  
ROUTINE CARE with Elmo Pena MD  
[facility] [address]) Appt Note: 3 mo Diabetes [address] [address]  
[place] [address] [place] [address] Street [address] Upcoming Health Maintenance Date Due  
 MEDICARE YEARLY EXAM 1/7/2017 HEMOGLOBIN A1C Q6M 6/2/2017 FOOT EXAM Q1 6/22/2017 EYE EXAM RETINAL OR DILATED Q1 8/18/2017 MICROALBUMIN Q1 12/2/2017 LIPID PANEL Q1 12/2/2017 GLAUCOMA SCREENING Q2Y 8/18/2018 COLONOSCOPY 1/21/2020 DTaP/Tdap/Td series (2 - Td) 11/7/2021 Allergies as of 2/10/2017  Review Complete On: 2/10/2017 By: Rashid Benz Severity Noted Reaction Type Reactions Morphine High 08/18/2015   Topical Rash Erythromycin Medium 02/24/2010   Intolerance Nausea and Vomiting  
 Sulfa (Sulfonamide Antibiotics) Medium 02/24/2010   Not Verified Rash Adhesive Tape-silicones  47/87/7260    Hives Cinnamon  05/28/2014   Systemic Nausea Only Codeine  02/24/2010   Side Effect Other (comments)  
 hallucinations Current Immunizations  Reviewed on 12/2/2016 Name Date Influenza Vaccine 9/15/2016, 9/17/2015, 9/18/2014, 10/17/2013 Influenza Vaccine Split 10/11/2012, 10/5/2009 Influenza Vaccine Whole 9/21/2011, 9/28/2010 Pneumococcal Conjugate (PCV-13) 3/15/2016 Pneumococcal Polysaccharide (PPSV-23) 12/13/2012 Pneumococcal Vaccine (Pcv) 11/24/2008 TDAP Vaccine 11/7/2011 Zoster Vaccine, Live 9/18/2014 Not reviewed this visit You Were Diagnosed With   
  
 Codes Comments Controlled type 2 diabetes mellitus with foot ulcer, without long-term current use of insulin ([facility]    -  Primary ICD-10-CM: E11.621, D07.354 ICD-9-CM: 250.80, 707.15 Essential hypertension, benign     ICD-10-CM: I10 
ICD-9-CM: 401.1 Ulcer of foot, right, limited to breakdown of skin ([facility]     ICD-10-CM: J72.249 ICD-9-CM: 707.15 Vitals BP Pulse Temp Resp Height(growth percentile) Weight(growth percentile) (!) 137/98 66 98.1 °F (36.7 °C) (Oral) 22 5' 4.5\" (1.638 m) 178 lb 12.8 oz (81.1 kg) SpO2 BMI Smoking Status 90% 30.22 kg/m2 Never Smoker Vitals History BMI and BSA Data Body Mass Index Body Surface Area  
 30.22 kg/m 2 1.92 m 2 Preferred Pharmacy Pharmacy Name Phone [facility] [address] [place] [address] [phone] Your Updated Medication List  
  
   
This list is accurate as of: 2/10/17  3:07 PM.  Always use your most recent med list.  
  
  
  
  
 ALEVE 220 mg tablet Generic drug:  naproxen sodium Take 220 mg by mouth as needed. aspirin delayed-release 81 mg tablet Take 1 Tab by mouth daily. atenolol 50 mg tablet Commonly known as:  TENORMIN Take 1 Tab by mouth daily. CENTRUM SILVER Tab tablet Generic drug:  multivitamins-minerals-lutein Take 1 Tab by mouth daily. clindamycin 300 mg capsule Commonly known as:  CLEOCIN Take 300 mg by mouth three (3) times daily. docusate sodium 100 mg capsule Commonly known as:  Karmen Donate Take 100 mg by mouth as needed. donepezil 10 mg tablet Commonly known as:  ARICEPT Take 1 Tab by mouth daily. FISH OIL 1,000 mg Cap Generic drug:  omega-3 fatty acids-vitamin e Take 1 Cap by mouth daily. Indications: 1400 mg daily  
  
 gabapentin 300 mg capsule Commonly known as:  NEURONTIN Take 1 Cap by mouth three (3) times daily. hydroCHLOROthiazide 25 mg tablet Commonly known as:  HYDRODIURIL Take 1 Tab by mouth daily. HYDROcodone-acetaminophen 5-325 mg per tablet Commonly known as:  Yelitza Mura Take 1 Tab by mouth every six (6) hours as needed for Pain. Max Daily Amount: 4 Tabs.  
  
 latanoprost 0.005 % ophthalmic solution Commonly known as:  Neno Quails Administer 1 Drop to right eye nightly. lisinopril 5 mg tablet Commonly known as:  Mary Lou [place] Take 1 Tab by mouth daily. loratadine 10 mg tablet Commonly known as:  Miracle Landers Take 1 Tab by mouth daily. LORazepam 0.5 mg tablet Commonly known as:  ATIVAN Take 1 Tab by mouth daily. Max Daily Amount: 0.5 mg.  
  
 LUTEIN-ZEAXANTHIN PO Take  by mouth daily. meclizine 12.5 mg tablet Commonly known as:  ANTIVERT Take 1 Tab by mouth three (3) times daily as needed. MILK OF MAGNESIA 400 mg/5 mL suspension Generic drug:  magnesium hydroxide Take 30 mL by mouth as needed. NIACIN FLUSH FREE PO Take 500 mg by mouth daily. omeprazole 40 mg capsule Commonly known as:  PRILOSEC  
TAKE ONE CAPSULE BY MOUTH ONCE DAILY FOR ACID REFLUX  
  
 risperiDONE 1 mg tablet Commonly known as:  RisperDAL Titrate as directed to 1 in am and 2 in pm  
  
 sertraline 100 mg tablet Commonly known as:  ZOLOFT Take 1.5 Tabs by mouth daily. simvastatin 40 mg tablet Commonly known as:  ZOCOR  
TAKE 1 TABLET BY MOUTH NIGHTLY traMADol 50 mg tablet Commonly known as:  ULTRAM  
TAKE ONE TABLET BY MOUTH EVERY 8 HOURS AS NEEDED FOR PAIN MAX  DAILY  AMOUNT  150MG  
  
 traZODone 50 mg tablet Commonly known as:  DESYREL  
TAKE 1 TABLET BY MOUTH NIGHTLY FOR SLEEP  
  
 VITAMIN D3 1,000 unit tablet Generic drug:  cholecalciferol Take 1,000 Units by mouth daily. Indications: VITAMIN D DEFICIENCY Prescriptions Printed Refills HYDROcodone-acetaminophen (NORCO) 5-325 mg per tablet 0 Sig: Take 1 Tab by mouth every six (6) hours as needed for Pain. Max Daily Amount: 4 Tabs. Class: Print Route: Oral  
  
Prescriptions Sent to Pharmacy Refills  
 gabapentin (NEURONTIN) 300 mg capsule 5 Sig: Take 1 Cap by mouth three (3) times daily. Class: Normal  
 Pharmacy: [address] #: [phone] Route: Oral  
  
We Performed the Following [address] [place] [address] Comments:  
 Please evaluate patient for amedysis, skilled care for diabetic foot ulcer, PT for strength and balance. ? Sitters? Follow-up Instructions Return in about 2 months (around 4/10/2017). Referral Information Referral ID Referred By Referred To  
  
 [id number] Mary Rachelle Not Available Visits Status Start Date End Date 1 New Request 2/10/17 2/10/18 If your referral has a status of pending review or denied, additional information will be sent to support the outcome of this decision. Patient Instructions Watch for worsening signs of infection- redness, swelling, fever or chills. Please provide this summary of care documentation to your next provider. Your primary care clinician is listed as [address]. If you have any questions after today's visit, please call [phone].

## 2017-02-10 NOTE — PROGRESS NOTES
Progress Note    Patient: Lucrecia Otero MRN: [id number]  SSN: [id number]    YOB: 1945  Age: 66 y.o. Sex: male        Chief Complaint   Patient presents with    Foot Pain    Follow-up    Medication Refill         Subjective:     Encounter Diagnoses   Name Primary?  Controlled type 2 diabetes mellitus with foot ulcer, without long-term current use of insulin ([address]): His blood sugars have been well controlled. This patient is managed under a comprehensive plan of care for Diabetes. Overall the patient feels well with good energy level. Pertinent Labs:   Lab Results   Component Value Date/Time    Hemoglobin A1c 6.3 12/02/2016 11:44 AM    Hemoglobin A1c 6.2 06/22/2016 11:27 AM    Hemoglobin A1c 6.1 03/01/2016 11:14 AM      Body mass index is 30.22 kg/(m^2). Lab Results   Component Value Date/Time    LDL, calculated 66 12/02/2016 11:44 AM         Lab Results   Component Value Date/Time    Sodium 140 12/02/2016 11:44 AM    Potassium 3.4 12/02/2016 11:44 AM    Chloride 95 12/02/2016 11:44 AM    CO2 28 12/02/2016 11:44 AM    Anion gap 7 09/30/2010 11:26 AM    Glucose 113 12/02/2016 11:44 AM    BUN 14 12/02/2016 11:44 AM    Creatinine 0.95 12/02/2016 11:44 AM    BUN/Creatinine ratio 15 12/02/2016 11:44 AM    GFR est AA 92 12/02/2016 11:44 AM    GFR est non-AA 80 12/02/2016 11:44 AM    Calcium 9.2 12/02/2016 11:44 AM    AST (SGOT) 23 12/02/2016 11:44 AM    Alk.  phosphatase 95 12/02/2016 11:44 AM    Protein, total 6.7 12/02/2016 11:44 AM    Albumin 4.1 12/02/2016 11:44 AM    Globulin 3.2 09/30/2010 11:26 AM    A-G Ratio 1.6 12/02/2016 11:44 AM    ALT (SGPT) 24 12/02/2016 11:44 AM     Lab Results   Component Value Date/Time    Microalbumin/Creat ratio (mg/g creat) 12 09/30/2010 11:26 AM    Microalb/Creat ratio (ug/mg creat.) <8.4 12/02/2016 11:44 AM    Microalbumin,urine random 1.66 09/30/2010 11:26 AM        Wt Readings from Last 3 Encounters:   02/10/17 178 lb 12.8 oz (81.1 kg)   01/30/17 172 lb 3.2 oz (78.1 kg)   12/02/16 177 lb (80.3 kg)        History   Smoking Status    Never Smoker   Smokeless Tobacco    Never Used     All the patient's questions regarding medications, diet and exercise were answered. Goal of A1C of less than 7.5% is our goal.   Our overall goal is to reduce or eliminate the long term consequences of poorly controlled diabetes. Yes    Essential hypertension, benign: His blood pressure is elevated today. He is quite emotional.  Is very anxious about his medical problems and is having more more difficulty understanding what is going on. BP Readings from Last 3 Encounters:   02/10/17 (!) 137/98   01/30/17 114/43   12/02/16 106/59     The patient reports:  taking medications as instructed, no medication side effects noted, no TIA's, no chest pain on exertion, no dyspnea on exertion, no swelling of ankles. Lab Results   Component Value Date/Time    Sodium 140 12/02/2016 11:44 AM    Potassium 3.4 12/02/2016 11:44 AM    Chloride 95 12/02/2016 11:44 AM    CO2 28 12/02/2016 11:44 AM    Anion gap 7 09/30/2010 11:26 AM    Glucose 113 12/02/2016 11:44 AM    BUN 14 12/02/2016 11:44 AM    Creatinine 0.95 12/02/2016 11:44 AM    BUN/Creatinine ratio 15 12/02/2016 11:44 AM    GFR est AA 92 12/02/2016 11:44 AM    GFR est non-AA 80 12/02/2016 11:44 AM    Calcium 9.2 12/02/2016 11:44 AM    Bilirubin, total 0.8 12/02/2016 11:44 AM    AST (SGOT) 23 12/02/2016 11:44 AM    Alk. phosphatase 95 12/02/2016 11:44 AM    Protein, total 6.7 12/02/2016 11:44 AM    Albumin 4.1 12/02/2016 11:44 AM    Globulin 3.2 09/30/2010 11:26 AM    A-G Ratio 1.6 12/02/2016 11:44 AM    ALT (SGPT) 24 12/02/2016 11:44 AM     Our goal is to normalize the blood pressure to decrease the risks of strokes and heart attacks. The patient is in agreement with the plan.  Ulcer of foot, right, limited to breakdown of skin [facility]):  He has developed right lateral heel ulcer present for several weeks-about 3 cm and shallow. His wife is been taking care of this cleaning it and applying Neosporin ointment. At this point home health care needs to be involved in his care. He probably needs protection on his left heel to prevent this from happening. It has to be related to the way he positions his feet.  Late onset Alzheimer's disease with behavioral disturbance: See above. Very emotional.  Difficulty understanding basic communication. Worried about his health obviously. His wife is a wonderful caretaker and she brought with her today her daughter Beth who can help her.  Localized edema: He has localized edema to his feet. He also has dependent rubror. He has had both venous and arterial Dopplers. The venous Dopplers showed only some lymph nodes in his groin which I think have been related to his heel ulcer. The clindamycin he was put on at the [facility] emergency room must have helped the adenopathy.  Heel ulcer, right, limited to breakdown of skin ([address]): This appears to be a second-degree PA. There is no foul discharge from it. I am trying to track down the culture from [facility] before  putting him back on antibiotics. Current and past medical information:    Current Medications after this visit[de-identified]   Current Outpatient Prescriptions   Medication Sig    HYDROcodone-acetaminophen (NORCO) 5-325 mg per tablet Take 1 Tab by mouth every six (6) hours as needed for Pain. Max Daily Amount: 4 Tabs.  gabapentin (NEURONTIN) 300 mg capsule Take 1 Cap by mouth three (3) times daily.  traMADol (ULTRAM) 50 mg tablet TAKE ONE TABLET BY MOUTH EVERY 8 HOURS AS NEEDED FOR PAIN MAX  DAILY  AMOUNT  150MG    clindamycin (CLEOCIN) 300 mg capsule Take 300 mg by mouth three (3) times daily.  lisinopril (PRINIVIL, ZESTRIL) 5 mg tablet Take 1 Tab by mouth daily.  meclizine (ANTIVERT) 12.5 mg tablet Take 1 Tab by mouth three (3) times daily as needed.     LORazepam (ATIVAN) 0.5 mg tablet Take 1 Tab by mouth daily. Max Daily Amount: 0.5 mg.    traZODone (DESYREL) 50 mg tablet TAKE 1 TABLET BY MOUTH NIGHTLY FOR SLEEP    risperiDONE (RISPERDAL) 1 mg tablet Titrate as directed to 1 in am and 2 in pm    sertraline (ZOLOFT) 100 mg tablet Take 1.5 Tabs by mouth daily.  simvastatin (ZOCOR) 40 mg tablet TAKE 1 TABLET BY MOUTH NIGHTLY    hydrochlorothiazide (HYDRODIURIL) 25 mg tablet Take 1 Tab by mouth daily.  atenolol (TENORMIN) 50 mg tablet Take 1 Tab by mouth daily.  donepezil (ARICEPT) 10 mg tablet Take 1 Tab by mouth daily.  omeprazole (PRILOSEC) 40 mg capsule TAKE ONE CAPSULE BY MOUTH ONCE DAILY FOR ACID REFLUX    magnesium hydroxide (MILK OF MAGNESIA) 400 mg/5 mL suspension Take 30 mL by mouth as needed.  latanoprost (XALATAN) 0.005 % ophthalmic solution Administer 1 Drop to right eye nightly.  docusate sodium (COLACE) 100 mg capsule Take 100 mg by mouth as needed.  aspirin delayed-release 81 mg tablet Take 1 Tab by mouth daily.  loratadine (CLARITIN) 10 mg tablet Take 1 Tab by mouth daily.  NIACIN, INOSITOL NIACINATE, (NIACIN FLUSH FREE PO) Take 500 mg by mouth daily.  LUTEIN EXTRACT/ZEAXANTH XT (LUTEIN-ZEAXANTHIN PO) Take  by mouth daily.  naproxen sodium (ALEVE) 220 mg tablet Take 220 mg by mouth as needed.  multivitamins-minerals-lutein (CENTRUM SILVER) Tab Take 1 Tab by mouth daily.  cholecalciferol, vitamin d3, (VITAMIN D) 1,000 unit tablet Take 1,000 Units by mouth daily. Indications: VITAMIN D DEFICIENCY    omega-3 fatty acids-vitamin e (FISH OIL) 1,000 mg Cap Take 1 Cap by mouth daily. Indications: 1400 mg daily     No current facility-administered medications for this visit.         Patient Active Problem List    Diagnosis Date Noted    Essential hypertension, benign 02/24/2010     Priority: 1 - One     Class: Chronic    Spinal stenosis, unspecified region other than cervical 02/24/2010     Priority: 1 - One     Class: Chronic    Controlled type 2 diabetes mellitus with skin complication, without long-term current use of insulin ([address] 02/24/2010     Priority: 1 - One     Class: Chronic    Pure hypercholesterolemia      Priority: 1 - One     Class: Chronic    Asplenia 02/24/2010     Priority: 2 - Two     Class: Chronic    Osteoarthrosis, unspecified whether generalized or localized, other specified sites 02/24/2010     Priority: 2 - Two     Class: Chronic    Irritable bowel syndrome 02/24/2010     Priority: 2 - Two     Class: Chronic    Horseshoe kidney 02/24/2010     Priority: 3 - Three     Class: Chronic    Dementia without behavioral disturbance 06/22/2016    Severe single current episode of major depressive disorder, with psychotic features ([address] 06/22/2016    Diabetes mellitus type 2 with complications ([facility] 61/22/8351    Late onset Alzheimer's disease with behavioral disturbance 03/01/2016    Anxiety 05/08/2015    S/P colonoscopy 05/28/2014    Arthritis of neck 05/16/2013    Glaucoma 03/29/2011    Positional vertigo 03/29/2011    Tinnitus 03/29/2011    Hearing loss, sensorineural 03/29/2011    Gastritis 09/30/2010       Past Medical History   Diagnosis Date    Anxiety     DDD (degenerative disc disease), cervical     DDD (degenerative disc disease), lumbar     Diabetes ([address]     Falls     Fatigue     GERD (gastroesophageal reflux disease)     Glaucoma     Glaucoma      right eye    Hearing loss     Horseshoe kidney     Hypertension     Irritable bowel syndrome     Osteoarthrosis, unspecified whether generalized or localized, other specified sites     Pure hypercholesterolemia     Ringing in ears     Severe single current episode of major depressive disorder, with psychotic features ([address] 6/22/2016    Snoring     Spinal stenosis, unspecified region other than cervical     Vertigo        Allergies   Allergen Reactions    Morphine Rash    Erythromycin Nausea and Vomiting    Sulfa (Sulfonamide Antibiotics) Rash    Adhesive Tape-Silicones Hives    Cinnamon Nausea Only    Codeine Other (comments)     hallucinations       Past Surgical History   Procedure Laterality Date    Hx splenectomy  1963    Pr appendectomy      Hx hernia repair  left inguinal    Pr laminectomy,lumbar  1992    Hx cholecystectomy  3/10    Hx orthopaedic       lumbar decompression    Hx heent      Amb poc eeg / sleep deprived      Hx splenectomy         Social History     Social History    Marital status:      Spouse name: N/A    Number of children: N/A    Years of education: N/A     Social History Main Topics    Smoking status: Never Smoker    Smokeless tobacco: Never Used    Alcohol use No    Drug use: No    Sexual activity: Yes     Partners: Female     Birth control/ protection: None     Other Topics Concern    None     Social History Narrative       Review of Systems   Constitutional: Negative. Negative for chills, fever, malaise/fatigue and weight loss. He has quite preserved and examined sitting down. He was on the verge of crying at several points during the visit. HENT: Negative. Negative for hearing loss. Eyes: Negative. Negative for blurred vision and double vision. Respiratory: Negative. Negative for cough, hemoptysis, sputum production and shortness of breath. Cardiovascular: Positive for leg swelling. Negative for chest pain, palpitations and orthopnea. His feet are cool to the touch and have dependent rubor. Gastrointestinal: Negative. Negative for abdominal pain, blood in stool, heartburn, nausea and vomiting. Genitourinary: Negative. Negative for dysuria, frequency and urgency. I can feel only one small lymph gland in the right groin area. I do not think the lymphadenopathy reported is significant at this point. I certainly do not want to put him through any further workup at this point. Musculoskeletal: Negative. Negative for back pain, myalgias and neck pain.    Skin: Negative. Negative for rash. Neurological: Negative. Negative for dizziness, tingling, tremors, weakness and headaches. Endo/Heme/Allergies: Negative. Psychiatric/Behavioral: Positive for depression, hallucinations and memory loss. The patient is nervous/anxious. Objective:     Vitals:    02/10/17 1440   BP: (!) 137/98   Pulse: 66   Resp: 22   Temp: 98.1 °F (36.7 °C)   TempSrc: Oral   SpO2: 90%   Weight: 178 lb 12.8 oz (81.1 kg)   Height: 5' 4.5\" (1.638 m)      Body mass index is 30.22 kg/(m^2). Physical Exam   Constitutional: He is oriented to person, place, and time and well-developed, well-nourished, and in no distress. You can tell from his physical appearance that his rapidly progressing. He can hardly walk now. Home health care and physical therapy will hopefully help him with balance and strength to prevent falls. HENT:   Head: Normocephalic and atraumatic. Mouth/Throat: Oropharynx is clear and moist.   He has a runny nose chronically. He is having trouble handling his oral secretions according to his wife. Eyes: Right eye exhibits no discharge. Left eye exhibits no discharge. No scleral icterus. Neck: No tracheal deviation present. No thyromegaly present. No bruit. Cardiovascular: Normal rate, regular rhythm and normal heart sounds. Pulmonary/Chest: Effort normal and breath sounds normal. No respiratory distress. He has no wheezes. He has no rales. Abdominal: Soft. He exhibits no distension. There is no tenderness. There is no rebound. Neurological: He is alert and oriented to person, place, and time. Skin: No rash noted. No erythema. Psychiatric: Mood and affect normal.   Poor memory, poor judgment, emotionally labile. Nursing note and vitals reviewed. Health Maintenance Due   Topic Date Due    MEDICARE YEARLY EXAM  01/07/2017       Assessment and orders:       ICD-10-CM    1.  Controlled type 2 diabetes mellitus with foot ulcer, without long-term current use of insulin (HCC)-well-controlled  E11.621 REFERRAL TO HOME HEALTH    L97.509    2. Essential hypertension, benign-elevated reading today, they will check at home. I10    3. Ulcer of foot, right, limited to breakdown of skin (HCC)-diabetic foot ulcer second-degree 3 cm right lateral heel-he needs pain relief and does not need to be suffering  L97.511 HYDROcodone-acetaminophen (NORCO) 5-325 mg per tablet  Controlled substances contract signed      [address] [place] [address]   4. Late onset Alzheimer's disease with behavioral disturbance-rapidly progressive  G30.1     F02.81    5. Localized edema with dependent rubor-vascularly intact   R60.0    6. Heel ulcer, right, limited to breakdown of skin ([facility] L97.411  wound care by Dominick          Plan of care:  Discussed diagnoses in detail with patient. Medication risks/benefits/side effects discussed with patient. All of the patient's questions were addressed. The patient understands and agrees with our plan of care. The patient knows to call back if they are unsure of or forget any changes we discussed today or if the symptoms change. The patient received an After-Visit Summary which contains VS, orders, medication list and allergy list. This can be used as a \"mini-medical record\" should they have to seek medical care while out of town. Patient Care Team:  Antonio Jiang MD as PCP - General Claudia Handley, RN as Nurse Navigator (Family Practice)  Ezio Garner MD (Neurology)  Estella Blas PsyD (Psychology)  Ana Mejía, RN as Ambulatory Care Navigator    Follow-up Disposition:  Return in about 2 months (around 4/10/2017).     Future Appointments  Date Time Provider Leatha Winston   2/15/2017 10:40 AM Ezio Garner MD Neuro [facility]   4/4/2017 10:30 AM Antonio Jiang MD [facility] SCHED       Signed By: Antonio Jiang MD     February 10, 2017

## 2017-02-15 NOTE — PATIENT INSTRUCTIONS
Information Regarding Testing     If you have physican order for a test or a medication denied by your insurance company, this does not mean the test or medication is not appropriate for you as that is a medical decision, not a decision to be made by an insurance company representative or by an [facility] physician who has not interviewed and examined you. This is a decision to be made between you and your physician. The denial of services is a contractual matter between you and your insurance company, not an issue between your physician and the insurance company. If your test or medication is denied, you can take the following steps to help resolve the issue:    1. File a complaint with the [facility] regarding your insurance company's denial of services ordered for you. You can do this either by calling them directly or by completing an on-line complaint form on the [employer]. This can be found at www.1stdibs    2. Also file a formal complaint with your insurance company and ask to have the name of the person denying the service so that you may explore a legal option should you be harmed by this denial of service. Again, the fact the insurance company will not pay for the service does not mean it is not medically necessary and I would encourage you to follow through with the plan that was made with your physician    3. File a written complaint with your employer so your employer and benefit manager is aware of the poor coverage they are providing their employees. If you have medicare/medicaid, complain to your representative in the House and to your Allen Nash.       [facility]   Statement to Patients  April 1, 2014      In an effort to ensure the large volume of patient prescription refills is processed in the most efficient and expeditious manner, we are asking our patients to assist us by calling your Pharmacy for all prescription refills, this will include also your  Mail Order Pharmacy. The pharmacy will contact our office electronically to continue the refill process. Please do not wait until the last minute to call your pharmacy. We need at least 48 hours (2days) to fill prescriptions. We also encourage you to call your pharmacy before going to  your prescription to make sure it is ready. With regard to controlled substance prescription refill requests (narcotic refills) that need to be picked up at our office, we ask your cooperation by providing us with at least 72 hours (3days) notice that you will need a refill. We will not refill narcotic prescription refill requests after 4:00pm on any weekday, Monday through Thursday, or after 2:00pm on Fridays, or on the weekends. We encourage everyone to explore another way of getting your prescription refill request processed using [employer], our patient web portal through our electronic medical record system. [employer] is an efficient and effective way to communicate your medication request directly to the office and  downloadable as an garima on your smart phone . [employer] also features a review functionality that allows you to view your medication list as well as leave messages for your physician. Are you ready to get connected? If so please review the attatched instructions or speak to any of our staff to get you set up right away! Thank you so much for your cooperation. Should you have any questions please contact our Practice Administrator. The Physicians and Staff,  [facility]       If we have ordered testing for you, we do not call patients with results and we do not give test results over the phone. We schedule follow up appointments so that your results can be discussed in person and any questions you have regarding them may be addressed.   If something of concern is revealed on your test, we will call you for a sooner follow up appointment. Additionally, results may be found by using the My Chart feature and one of our patient service representatives at the  can give you instructions on how to access this feature of our electronic medical record system. Learning About Living [place]  What is a living will? A living will is a legal form you use to write down the kind of care you want at the end of your life. It is used by the health professionals who will treat you if you aren't able to decide for yourself. If you put your wishes in writing, your loved ones and others will know what kind of care you want. They won't need to guess. This can ease your mind and be helpful to others. A living will is not the same as an estate or property will. An estate will explains what you want to happen with your money and property after you die. Is a living will a legal document? A living will is a legal document. Each state has its own laws about living chong. If you move to another state, make sure that your living will is legal in the state where you now live. Or you might use a universal form that has been approved by many states. This kind of form can sometimes be completed and stored online. Your electronic copy will then be available wherever you have a connection to the Internet. In most cases, doctors will respect your wishes even if you have a form from a different state. · You don't need an  to complete a living will. But legal advice can be helpful if your state's laws are unclear, your health history is complicated, or your family can't agree on what should be in your living will. · You can change your living will at any time. Some people find that their wishes about end-of-life care change as their health changes. · In addition to making a living will, think about completing a medical power of  form.  This form lets you name the person you want to make end-of-life treatment decisions for you (your \"health care agent\") if you're not able to. Many hospitals and nursing homes will give you the forms you need to complete a living will and a medical power of . · Your living will is used only if you can't make or communicate decisions for yourself anymore. If you become able to make decisions again, you can accept or refuse any treatment, no matter what you wrote in your living will. · Your state may offer an online registry. This is a place where you can store your living will online so the doctors and nurses who need to treat you can find it right away. What should you think about when creating a living will? Talk about your end-of-life wishes with your family members and your doctor. Let them know what you want. That way the people making decisions for you won't be surprised by your choices. Think about these questions as you make your living will:  · Do you know enough about life support methods that might be used? If not, talk to your doctor so you know what might be done if you can't breathe on your own, your heart stops, or you're unable to swallow. · What things would you still want to be able to do after you receive life-support methods? Would you want to be able to walk? To speak? To eat on your own? To live without the help of machines? · If you have a choice, where do you want to be cared for? In your home? At a hospital or nursing home? · Do you want certain [place] practices performed if you become very ill? · If you have a choice at the end of your life, where would you prefer to die? At home? In a hospital or nursing home? Somewhere else? · Would you prefer to be buried or cremated? · Do you want your organs to be donated after you die? What should you do with your living will? · Make sure that your family members and your health care agent have copies of your living will. · Give your doctor a copy of your living will to keep in your medical record.  If you have more than one doctor, make sure that each one has a copy. · You may want to put a copy of your living will where it can be easily found. Where can you learn more? Go to http://juana-donaldo.info/. Enter W141 in the search box to learn more about \"Learning About Living [place]. \"  Current as of: February 24, 2016  Content Version: 11.1  © 3956-1718 [employer]. Care instructions adapted under license by [employer] (which disclaims liability or warranty for this information). If you have questions about a medical condition or this instruction, always ask your healthcare professional. [address] any warranty or liability for your use of this information.

## 2017-02-15 NOTE — MR AVS SNAPSHOT
Visit Information Date & Time Provider Department Dept. Phone Encounter #  
 2/15/2017 10:40 AM Tessie Esteban MD Neurology [address] [facility] [phone] [id number] Follow-up Instructions Return for follow next time I'm in [place]. Your Appointments 4/4/2017 10:30 AM  
ROUTINE CARE with Shaheen Rodriguez MD  
[address]) Appt Note: 3 mo Diabetes [address] [address]  
[place] [address] [address] Upcoming Health Maintenance Date Due  
 MEDICARE YEARLY EXAM 1/7/2017 HEMOGLOBIN A1C Q6M 6/2/2017 FOOT EXAM Q1 6/22/2017 EYE EXAM RETINAL OR DILATED Q1 8/18/2017 MICROALBUMIN Q1 12/2/2017 LIPID PANEL Q1 12/2/2017 GLAUCOMA SCREENING Q2Y 8/18/2018 COLONOSCOPY 1/21/2020 DTaP/Tdap/Td series (2 - Td) 11/7/2021 Allergies as of 2/15/2017  Review Complete On: 2/15/2017 By: Tessie Esteban MD  
  
 Severity Noted Reaction Type Reactions Morphine High 08/18/2015   Topical Rash Erythromycin Medium 02/24/2010   Intolerance Nausea and Vomiting  
 Sulfa (Sulfonamide Antibiotics) Medium 02/24/2010   Not Verified Rash Adhesive Tape-silicones  24/78/2194    Hives Cinnamon  05/28/2014   Systemic Nausea Only Codeine  02/24/2010   Side Effect Other (comments)  
 hallucinations Current Immunizations  Reviewed on 12/2/2016 Name Date Influenza Vaccine 9/15/2016, 9/17/2015, 9/18/2014, 10/17/2013 Influenza Vaccine Split 10/11/2012, 10/5/2009 Influenza Vaccine Whole 9/21/2011, 9/28/2010 Pneumococcal Conjugate (PCV-13) 3/15/2016 Pneumococcal Polysaccharide (PPSV-23) 12/13/2012 Pneumococcal Vaccine (Pcv) 11/24/2008 TDAP Vaccine 11/7/2011 Zoster Vaccine, Live 9/18/2014 Not reviewed this visit Vitals BP Pulse Temp Resp Height(growth percentile) Weight(growth percentile) 110/62 62 98 °F (36.7 °C) 16 5' 4.5\" (1.638 m) 178 lb (80.7 kg) SpO2 BMI Smoking Status 94% 30.08 kg/m2 Never Smoker Vitals History BMI and BSA Data Body Mass Index Body Surface Area 30.08 kg/m 2 1.92 m 2 Preferred Pharmacy Pharmacy Name Phone [facility] [address] [place] [address] [phone] Your Updated Medication List  
  
   
This list is accurate as of: 2/15/17 11:08 AM.  Always use your most recent med list.  
  
  
  
  
 ALEVE 220 mg tablet Generic drug:  naproxen sodium Take 220 mg by mouth as needed. aspirin delayed-release 81 mg tablet Take 1 Tab by mouth daily. atenolol 50 mg tablet Commonly known as:  TENORMIN Take 1 Tab by mouth daily. CENTRUM SILVER Tab tablet Generic drug:  multivitamins-minerals-lutein Take 1 Tab by mouth daily. clindamycin 300 mg capsule Commonly known as:  CLEOCIN Take 300 mg by mouth three (3) times daily. docusate sodium 100 mg capsule Commonly known as:  Henreitta Dunkirk Take 100 mg by mouth as needed. donepezil 10 mg tablet Commonly known as:  ARICEPT Take 1 Tab by mouth daily. FISH OIL 1,000 mg Cap Generic drug:  omega-3 fatty acids-vitamin e Take 1 Cap by mouth daily. Indications: 1400 mg daily  
  
 gabapentin 300 mg capsule Commonly known as:  NEURONTIN Take 1 Cap by mouth three (3) times daily. hydroCHLOROthiazide 25 mg tablet Commonly known as:  HYDRODIURIL Take 1 Tab by mouth daily. HYDROcodone-acetaminophen 5-325 mg per tablet Commonly known as:  Rolinda Doles Take 1 Tab by mouth every six (6) hours as needed for Pain. Max Daily Amount: 4 Tabs.  
  
 latanoprost 0.005 % ophthalmic solution Commonly known as:  Janette Romero Administer 1 Drop to right eye nightly. lisinopril 5 mg tablet Commonly known as:  Melissa Brock Take 1 Tab by mouth daily. loratadine 10 mg tablet Commonly known as:  Shirley Columba Take 1 Tab by mouth daily. LORazepam 0.5 mg tablet Commonly known as:  ATIVAN Take 1 Tab by mouth daily. Max Daily Amount: 0.5 mg.  
  
 LUTEIN-ZEAXANTHIN PO Take  by mouth daily. meclizine 12.5 mg tablet Commonly known as:  ANTIVERT Take 1 Tab by mouth three (3) times daily as needed. MILK OF MAGNESIA 400 mg/5 mL suspension Generic drug:  magnesium hydroxide Take 30 mL by mouth as needed. NIACIN FLUSH FREE PO Take 500 mg by mouth daily. omeprazole 40 mg capsule Commonly known as:  PRILOSEC  
TAKE ONE CAPSULE BY MOUTH ONCE DAILY FOR  ACID  REFLUX  
  
 risperiDONE 1 mg tablet Commonly known as:  RisperDAL Titrate as directed to 1 in am and 2 in pm  
  
 sertraline 100 mg tablet Commonly known as:  ZOLOFT Take 1.5 Tabs by mouth daily. simvastatin 40 mg tablet Commonly known as:  ZOCOR  
TAKE 1 TABLET BY MOUTH NIGHTLY  
  
 traMADol 50 mg tablet Commonly known as:  ULTRAM  
TAKE ONE TABLET BY MOUTH EVERY 8 HOURS AS NEEDED FOR PAIN MAX  DAILY  AMOUNT  150MG  
  
 traZODone 50 mg tablet Commonly known as:  DESYREL  
TAKE 1 TABLET BY MOUTH NIGHTLY FOR SLEEP  
  
 VITAMIN D3 1,000 unit tablet Generic drug:  cholecalciferol Take 1,000 Units by mouth daily. Indications: VITAMIN D DEFICIENCY Follow-up Instructions Return for follow next time I'm in [place]. Patient Instructions Information Regarding Testing If you have physican order for a test or a medication denied by your insurance company, this does not mean the test or medication is not appropriate for you as that is a medical decision, not a decision to be made by an insurance company representative or by an [facility] physician who has not interviewed and examined you. This is a decision to be made between you and your physician.   
 
The denial of services is a contractual matter between you and your insurance company, not an issue between your physician and the insurance company. If your test or medication is denied, you can take the following steps to help resolve the issue: 1. File a complaint with the [facility] regarding your insurance company's denial of services ordered for you. You can do this either by calling them directly or by completing an on-line complaint form on the [employer]. This can be found at www.[place].VendorShop 2. Also file a formal complaint with your insurance company and ask to have the name of the person denying the service so that you may explore a legal option should you be harmed by this denial of service. Again, the fact the insurance company will not pay for the service does not mean it is not medically necessary and I would encourage you to follow through with the plan that was made with your physician 3. File a written complaint with your employer so your employer and benefit manager is aware of the poor coverage they are providing their employees. If you have medicare/medicaid, complain to your representative in the House and to your Allen Nash. PRESCRIPTION REFILL POLICY [facility] Statement to Patients April 1, 2014 In an effort to ensure the large volume of patient prescription refills is processed in the most efficient and expeditious manner, we are asking our patients to assist us by calling your Pharmacy for all prescription refills, this will include also your  Mail Order Pharmacy. The pharmacy will contact our office electronically to continue the refill process. Please do not wait until the last minute to call your pharmacy. We need at least 48 hours (2days) to fill prescriptions. We also encourage you to call your pharmacy before going to  your prescription to make sure it is ready.   
 
With regard to controlled substance prescription refill requests (narcotic refills) that need to be picked up at our office, we ask your cooperation by providing us with at least 72 hours (3days) notice that you will need a refill. We will not refill narcotic prescription refill requests after 4:00pm on any weekday, Monday through Thursday, or after 2:00pm on Fridays, or on the weekends. We encourage everyone to explore another way of getting your prescription refill request processed using [employer], our patient web portal through our electronic medical record system. [employer] is an efficient and effective way to communicate your medication request directly to the office and  downloadable as an garima on your smart phone . [employer] also features a review functionality that allows you to view your medication list as well as leave messages for your physician. Are you ready to get connected? If so please review the attatched instructions or speak to any of our staff to get you set up right away! Thank you so much for your cooperation. Should you have any questions please contact our Practice Administrator. The Physicians and Staff,  [facility] If we have ordered testing for you, we do not call patients with results and we do not give test results over the phone. We schedule follow up appointments so that your results can be discussed in person and any questions you have regarding them may be addressed. If something of concern is revealed on your test, we will call you for a sooner follow up appointment. Additionally, results may be found by using the My Chart feature and one of our patient service representatives at the  can give you instructions on how to access this feature of our electronic medical record system. Aminta Ballard 1726 What is a living will? A living will is a legal form you use to write down the kind of care you want at the end of your life.  It is used by the health professionals who will treat you if you aren't able to decide for yourself. If you put your wishes in writing, your loved ones and others will know what kind of care you want. They won't need to guess. This can ease your mind and be helpful to others. A living will is not the same as an estate or property will. An estate will explains what you want to happen with your money and property after you die. Is a living will a legal document? A living will is a legal document. Each state has its own laws about living chong. If you move to another state, make sure that your living will is legal in the state where you now live. Or you might use a universal form that has been approved by many states. This kind of form can sometimes be completed and stored online. Your electronic copy will then be available wherever you have a connection to the Internet. In most cases, doctors will respect your wishes even if you have a form from a different state. · You don't need an  to complete a living will. But legal advice can be helpful if your state's laws are unclear, your health history is complicated, or your family can't agree on what should be in your living will. · You can change your living will at any time. Some people find that their wishes about end-of-life care change as their health changes. · In addition to making a living will, think about completing a medical power of  form. This form lets you name the person you want to make end-of-life treatment decisions for you (your \"health care agent\") if you're not able to. Many hospitals and nursing homes will give you the forms you need to complete a living will and a medical power of . · Your living will is used only if you can't make or communicate decisions for yourself anymore. If you become able to make decisions again, you can accept or refuse any treatment, no matter what you wrote in your living will. · Your state may offer an online registry. This is a place where you can store your living will online so the doctors and nurses who need to treat you can find it right away. What should you think about when creating a living will? Talk about your end-of-life wishes with your family members and your doctor. Let them know what you want. That way the people making decisions for you won't be surprised by your choices. Think about these questions as you make your living will: · Do you know enough about life support methods that might be used? If not, talk to your doctor so you know what might be done if you can't breathe on your own, your heart stops, or you're unable to swallow. · What things would you still want to be able to do after you receive life-support methods? Would you want to be able to walk? To speak? To eat on your own? To live without the help of machines? · If you have a choice, where do you want to be cared for? In your home? At a hospital or nursing home? · Do you want certain Faith practices performed if you become very ill? · If you have a choice at the end of your life, where would you prefer to die? At home? In a hospital or nursing home? Somewhere else? · Would you prefer to be buried or cremated? · Do you want your organs to be donated after you die? What should you do with your living will? · Make sure that your family members and your health care agent have copies of your living will. · Give your doctor a copy of your living will to keep in your medical record. If you have more than one doctor, make sure that each one has a copy. · You may want to put a copy of your living will where it can be easily found. Where can you learn more? Go to http://juana-donaldo.info/. Enter A308 in the search box to learn more about \"Learning About Living [place]. \" Current as of: February 24, 2016 Content Version: 11.1 © 1873-5549 [employer]. Care instructions adapted under license by [employer] (which disclaims liability or warranty for this information). If you have questions about a medical condition or this instruction, always ask your healthcare professional. [address] any warranty or liability for your use of this information. Please provide this summary of care documentation to your next provider. Your primary care clinician is listed as [address]. If you have any questions after today's visit, please call [phone].

## 2017-02-15 NOTE — PROGRESS NOTES
Follow up for memory loss. Spouse reports significant decline in memory. Patient rePatient receiving PT/OT/ST at home. Nursing for right foot ulcer.

## 2017-02-15 NOTE — TELEPHONE ENCOUNTER
Pt has broken out all over with a red rash. Everywhere. He had an appointment with Dr. Sherri Roth (Neurologist). He saw the rash and told him to stop both of the new medications for a couple of days. In a couple of days to start back with the [facility] for a few days and then add the Gabapentin so they would know which one is actually causing it. Want Dr. Ritesh Dumont to be aware. Will put in his notes. Asking Amy to please call.

## 2017-02-15 NOTE — PROGRESS NOTES
[facility] [address]   [place] [address]   [place], [address] [place] [address] Drive    FRANCISCA Pagan [phone] Fax               Chief Complaint   Patient presents with    Memory Loss     follow up     Current Outpatient Prescriptions   Medication Sig Dispense Refill    omeprazole (PRILOSEC) 40 mg capsule TAKE ONE CAPSULE BY MOUTH ONCE DAILY FOR  ACID  REFLUX 90 Cap 3    HYDROcodone-acetaminophen (NORCO) 5-325 mg per tablet Take 1 Tab by mouth every six (6) hours as needed for Pain. Max Daily Amount: 4 Tabs. 120 Tab 0    gabapentin (NEURONTIN) 300 mg capsule Take 1 Cap by mouth three (3) times daily. 90 Cap 5    traMADol (ULTRAM) 50 mg tablet TAKE ONE TABLET BY MOUTH EVERY 8 HOURS AS NEEDED FOR PAIN MAX  DAILY  AMOUNT  150MG 90 Tab 0    clindamycin (CLEOCIN) 300 mg capsule Take 300 mg by mouth three (3) times daily.  lisinopril (PRINIVIL, ZESTRIL) 5 mg tablet Take 1 Tab by mouth daily. 30 Tab 5    meclizine (ANTIVERT) 12.5 mg tablet Take 1 Tab by mouth three (3) times daily as needed. 90 Tab 4    LORazepam (ATIVAN) 0.5 mg tablet Take 1 Tab by mouth daily. Max Daily Amount: 0.5 mg. 30 Tab 5    traZODone (DESYREL) 50 mg tablet TAKE 1 TABLET BY MOUTH NIGHTLY FOR SLEEP 30 Tab 5    risperiDONE (RISPERDAL) 1 mg tablet Titrate as directed to 1 in am and 2 in pm 90 Tab 3    sertraline (ZOLOFT) 100 mg tablet Take 1.5 Tabs by mouth daily. 45 Tab 11    simvastatin (ZOCOR) 40 mg tablet TAKE 1 TABLET BY MOUTH NIGHTLY 90 Tab 3    hydrochlorothiazide (HYDRODIURIL) 25 mg tablet Take 1 Tab by mouth daily. 90 Tab 3    atenolol (TENORMIN) 50 mg tablet Take 1 Tab by mouth daily. 90 Tab 3    donepezil (ARICEPT) 10 mg tablet Take 1 Tab by mouth daily. 90 Tab 3    magnesium hydroxide (MILK OF MAGNESIA) 400 mg/5 mL suspension Take 30 mL by mouth as needed.  latanoprost (XALATAN) 0.005 % ophthalmic solution Administer 1 Drop to right eye nightly.       docusate sodium (COLACE) 100 mg capsule Take 100 mg by mouth as needed.  aspirin delayed-release 81 mg tablet Take 1 Tab by mouth daily.  loratadine (CLARITIN) 10 mg tablet Take 1 Tab by mouth daily. 30 Tab 2    NIACIN, INOSITOL NIACINATE, (NIACIN FLUSH FREE PO) Take 500 mg by mouth daily.  LUTEIN EXTRACT/ZEAXANTH XT (LUTEIN-ZEAXANTHIN PO) Take  by mouth daily.  naproxen sodium (ALEVE) 220 mg tablet Take 220 mg by mouth as needed.  multivitamins-minerals-lutein (CENTRUM SILVER) Tab Take 1 Tab by mouth daily.  cholecalciferol, vitamin d3, (VITAMIN D) 1,000 unit tablet Take 1,000 Units by mouth daily. Indications: VITAMIN D DEFICIENCY      omega-3 fatty acids-vitamin e (FISH OIL) 1,000 mg Cap Take 1 Cap by mouth daily. Indications: 1400 mg daily        Allergies   Allergen Reactions    Morphine Rash    Erythromycin Nausea and Vomiting    Sulfa (Sulfonamide Antibiotics) Rash    Adhesive Tape-Silicones Hives    Cinnamon Nausea Only    Codeine Other (comments)     hallucinations     Social History   Substance Use Topics    Smoking status: Never Smoker    Smokeless tobacco: Never Used    Alcohol use No     Patient returns today coming by his wife for follow-up dementia, Alzheimer's type. He has been maintained on Aricept. He did not tolerate Namenda. We also put him on Risperdal for agitation. Last visit, we increased the Risperdal to 1 mg in the morning and 2 mg at night. That has made a significant difference. Since I saw him last he had lumbar surgery at [facility].  He also was diagnosed with peripheral neuropathy felt to be on the basis of his diabetes but no EMG was done. He had a venous Doppler done and arterial studies as well. He was put on Neurontin. He has begun to get a rash. He was started on some Vicodin recently. That seemed to be in the onset of the rash. He is used that before. The Ultram was not giving him good pain relief.     Review of systems  Not reliable due to his condition    Examination  Visit Vitals    /62    Pulse 62    Temp 98 °F (36.7 °C)    Resp 16    Ht 5' 4.5\" (1.638 m)    Wt 80.7 kg (178 lb)    SpO2 94%    BMI 30.08 kg/m2     Pleasant gentleman. Awake and alert. Stigmata of dementia. No focal deficit. Impression/Plan  Dementia, Alzheimer's type. Continue with her current regimen. New complaint of rash and his wife had many questions today regarding medications the rash his pain dementia, decline, prognosis, etc.  We discussed all of these today. In terms of the rash given the temporal relationship I told him to hold off on the hydrocodone for now but let Dr. Goldstein Hidden his nurse know that. Once the rash dies down he can certainly rechallenge and see if it comes back and that would tell us of that was the problem or not. He could use the Ultram in the interim although it is not best.  Again counseled to let Dr. Fili West office know about the rash as well. Answered questions as above. Follow in [facility] the next time I am there secondary to difficulty in getting him into town. Total time: 30 min   Counseling / coordination time: 20 min   > 50% counseling / coordination?: Yes re: as documented above      This note was created using voice recognition software. Despite editing, there may be syntax errors. This note will not be viewable in [address]

## 2017-02-15 NOTE — TELEPHONE ENCOUNTER
EVER    Spoke with patients wife -- she states that patient is covered in a \"red rash\" from his head to his toes. He denies any itching or burning. Patient had an appointment today with his Neurologist. He is going to stop taking both the Hydrocodone & Gabapentin per Dr. Lara Anderson (Neurologist) since we do not know which medication could've caused this rash. Patients wife says that he has never taken Gabapentin or Hydrocodone before and that they are both new medications for him. Advised patients wife that Dr. Suki Vu is not in the office but will put the message out there and if patient starts to have any trouble breathing or gets worse to call 911. She verbalized understanding.

## 2017-02-16 NOTE — TELEPHONE ENCOUNTER
Spoke with patients wife and advised her that if patient has any blisters in his mouth to go to the ER. She says she stopped his gabapentin yesterday at 1:30pm and stopped his hydrocodone at 9AM yesterday morning. She gave him a Benadryl this morning and says his rash looks about the same. Patient is now c/o a sore throat. Notified Dr. Des Urena in detail of above. Per Dr. Des Urena: the rash will probably last about 7 days, I would not give him hydrocodone or gabapentin again. If he is having pain he has tramadol that he can take. I would like to see him in 2 weeks. Scheduled patient an appointment to see Dr. Des Urena in 2 weeks. Advised patients wife that if he gets worse to carry him to the ER. She verbalized understanding.

## 2017-02-17 PROBLEM — R21 RASH AND NONSPECIFIC SKIN ERUPTION: Status: ACTIVE | Noted: 2017-01-01

## 2017-02-17 NOTE — PROGRESS NOTES
[facility]: MED RECONCILIATION    Comments/Recommendations:     Recent new medications:   Patient received a 1 gm injection of ceftriaxone on 1/28/17   Hydrocodone/apap taken prn between 1/28 and 2/15. Stopped on 2/15 due to rash   Gabapentin newly added this month but stopped on 2/15 due to rash   A 10 day course of clindamycin was completed ~ first week of February    Please continue tramadol. Wife reports this medication is taken regularly (for neuropathy and DDD)    Medications added:     · none    Medications removed:    · Clindamycin - completed 10 day course ~ 1st week of February  · Hydrocodone/apap - newer drug that was stopped this week due to rash  · Gabapentin - new drug started this month but stopped due to rash    Medications adjusted:    · Aleve is taken BID not prn  · Lorazepam is taken in the evening not am    Information obtained from: spouse    Significant PMH/Disease States:   Past Medical History   Diagnosis Date    Anxiety     DDD (degenerative disc disease), cervical     DDD (degenerative disc disease), lumbar     Dementia     Diabetes ([address]     Falls     Fatigue     GERD (gastroesophageal reflux disease)     Glaucoma     Glaucoma      right eye    Hearing loss     Horseshoe kidney     Hypertension     Irritable bowel syndrome     Neuropathy     Osteoarthrosis, unspecified whether generalized or localized, other specified sites     Pressure sore on heel     Pure hypercholesterolemia     Ringing in ears     Severe single current episode of major depressive disorder, with psychotic features ([address] 6/22/2016    Snoring     Spinal stenosis, unspecified region other than cervical     Vertigo      Chief Complaint for this Admission:   Chief Complaint   Patient presents with    Allergic Reaction     Allergies: Morphine; Erythromycin; Sulfa (sulfonamide antibiotics);  Adhesive tape-silicones; Cinnamon; Codeine; and Shellfish derived    Prior to Admission Medications: Medication Documentation Review Audit       Reviewed by Mindy Enriquez [facility] (Pharmacist) on 02/17/17 at 1802         Medication Sig Documenting Provider Last Dose Status Taking?      aspirin delayed-release 81 mg tablet Take 1 Tab by mouth daily. Regino Pereira MD 2/17/2017 am Active Yes             Med Note (Susana Wells   Fri Feb 17, 2017  5:59 PM):        atenolol (TENORMIN) 50 mg tablet Take 1 Tab by mouth daily. Regino Pereira MD 2/17/2017 am Active Yes    cholecalciferol, vitamin d3, (VITAMIN D) 1,000 unit tablet Take 1,000 Units by mouth daily. Indications: VITAMIN D DEFICIENCY Historical Provider 2/17/2017 am Active Yes    donepezil (ARICEPT) 10 mg tablet Take 1 Tab by mouth daily. Duglas Canela MD 2/17/2017 am Active Yes    hydrochlorothiazide (HYDRODIURIL) 25 mg tablet Take 1 Tab by mouth daily. Regino Pereira MD 2/17/2017 am Active Yes    latanoprost (XALATAN) 0.005 % ophthalmic solution Administer 1 Drop to right eye nightly. Historical Provider 2/16/2017 pm Active Yes    lisinopril (PRINIVIL, ZESTRIL) 5 mg tablet Take 1 Tab by mouth daily. Regino Pereira MD 2/17/2017 am Active Yes    LORazepam (ATIVAN) 0.5 mg tablet Take 0.5 mg by mouth every evening. Historical Provider 2/16/2017 5 pm Active Yes    meclizine (ANTIVERT) 12.5 mg tablet Take 1 Tab by mouth three (3) times daily as needed. Regino Pereira MD 1/17/2017 Unknown time Active Yes    multivitamins-minerals-lutein (CENTRUM SILVER) Tab Take 1 Tab by mouth daily. Historical Provider 2/17/2017 am Active Yes    naproxen sodium (ALEVE) 220 mg tablet Take 220 mg by mouth two (2) times daily (with meals). Historical Provider 2/17/2017 am Active Yes                   omeprazole (PRILOSEC) 40 mg capsule TAKE ONE CAPSULE BY MOUTH ONCE DAILY FOR  ACID  REFLUX Regino Pereira MD 2/17/2017 am Active Yes    risperiDONE (RISPERDAL) 1 mg tablet Take 1 mg by mouth daily.  Historical Provider 2/17/2017 am Active Yes    risperiDONE (RISPERDAL) 1 mg tablet Take 2 mg by mouth every evening. Historical Provider 2/16/2017 5 pm Active Yes    sertraline (ZOLOFT) 100 mg tablet Take 1.5 Tabs by mouth daily. Anjana Forman MD 2/17/2017 am Active Yes    simvastatin (ZOCOR) 40 mg tablet TAKE 1 TABLET BY MOUTH NIGHTLY Anjana Forman MD 2/16/2017 pm Active Yes    traMADol (ULTRAM) 50 mg tablet TAKE ONE TABLET BY MOUTH EVERY 8 HOURS AS NEEDED FOR PAIN MAX  DAILY  AMOUNT  150MG Mimi Figueroa MD 2/17/2017 am Active Yes    traZODone (DESYREL) 50 mg tablet TAKE 1 TABLET BY MOUTH NIGHTLY FOR SLEEP Anjana Forman MD 2/16/2017 pm Active Yes                  Thank you,    Tatum Sherwood.  Idalia Zambrano [facility]

## 2017-02-17 NOTE — ED NOTES
Pt stood at bedside to void in urinal. Needed assist x 2. Pt repositioned in bed. Heels floated on 2 pillows.

## 2017-02-17 NOTE — TELEPHONE ENCOUNTER
----- Message from Guillermina Garrido sent at 2/17/2017  3:15 PM EST -----  Regarding: Dr. Foote Fossa: [phone]  Patient's is having a reaction to a medication. Can you call his wife Maria Del Rosario Aguilar and recommend something for the itching. Her number is [phone].

## 2017-02-17 NOTE — ED PROVIDER NOTES
HPI Comments: 66 y.o. male with past medical history significant for DM, pure hypercholesterolemia, spinal stenosis, IBS, glaucoma, osteoarthritis, horseshoe kidney, GERD, HTN, anxiety, falls, fatigue, hearing loss, ringing in ears, snoring, vertigo, DDD and neuropathy who presents from home via EMS with chief complaint of rash. Per spouse, the pt experienced onset of a generalized rash Tuesday night (2/14/2017). The spouse makes it known that the pt started taking Gabapentin about 2-3 weeks ago as well as hydrocodone (began taking one week ago). She adds that these are new medications for the pt and is concerned that he is having an allergic reaction. The spouse notes the pt's last dose of both medication was Wednesday morning (2/15/2017). Per EMS, the pt reports having ulcers to his bilateral feet and rates his current pain 5/10. Per spouse, the pt has been taking benadryl PO with minimal relief. He denies SOB, fever and chills. There are no other acute medical concerns at this time. Social hx: Non-smoker, No ETOH use     PCP: Amarilis Olson MD    Note written by Dmitry Pugh, as dictated by Anna Rivera MD 3:16 PM           The history is provided by the patient, the EMS personnel and the spouse.         Past Medical History:   Diagnosis Date    Anxiety     DDD (degenerative disc disease), cervical     DDD (degenerative disc disease), lumbar     Diabetes ([address]     Falls     Fatigue     GERD (gastroesophageal reflux disease)     Glaucoma     Glaucoma      right eye    Hearing loss     Horseshoe kidney     Hypertension     Irritable bowel syndrome     Neuropathy     Osteoarthrosis, unspecified whether generalized or localized, other specified sites     Pure hypercholesterolemia     Ringing in ears     Severe single current episode of major depressive disorder, with psychotic features ([address] 6/22/2016    Snoring     Spinal stenosis, unspecified region other than cervical     Vertigo        Past Surgical History:   Procedure Laterality Date    Hx splenectomy  1963    Pr appendectomy      Hx hernia repair  left inguinal    Pr laminectomy,lumbar  1992    Hx cholecystectomy  3/10    Hx orthopaedic       lumbar decompression    Hx heent      Amb poc eeg / sleep deprived      Hx splenectomy           Family History:   Problem Relation Age of Onset    Stroke Father     Diabetes Mother     Hypertension Mother     Heart Disease Mother     Cancer Mother     Dementia Mother     Stroke Mother     Dementia Sister     Stroke Sister     Dementia Other        Social History     Social History    Marital status:      Spouse name: N/A    Number of children: N/A    Years of education: N/A     Occupational History    Not on file. Social History Main Topics    Smoking status: Never Smoker    Smokeless tobacco: Never Used    Alcohol use No    Drug use: No    Sexual activity: Yes     Partners: Female     Birth control/ protection: None     Other Topics Concern    Not on file     Social History Narrative         ALLERGIES: Morphine; Erythromycin; Sulfa (sulfonamide antibiotics); Adhesive tape-silicones; Cinnamon; Codeine; and Shellfish derived    Review of Systems   Constitutional: Negative for chills and fever. HENT: Negative for trouble swallowing. Respiratory: Negative for shortness of breath. Musculoskeletal: Positive for myalgias (bilateral feet). Skin: Positive for rash (generalized) and wound (Ulcers present to bilateral feet). All other systems reviewed and are negative. There were no vitals filed for this visit. Physical Exam   Constitutional: He is oriented to person, place, and time. He appears well-developed and well-nourished. No distress. NAD, AxOx1, speaking in complete sentences, pleasently demented, GCS = 14  Noted diffuse rash   HENT:   Head: Normocephalic and atraumatic.    Nose: Nose normal.   Mouth/Throat: Oropharynx is clear and moist.   Cn intact grossly    No facial droop/ slurred speech/ tongue deviation    Noted thrush     Eyes: Conjunctivae and EOM are normal. Pupils are equal, round, and reactive to light. Right eye exhibits no discharge. Left eye exhibits no discharge. Neck: Normal range of motion. Neck supple. Cardiovascular: Normal rate, regular rhythm, normal heart sounds and intact distal pulses. Exam reveals no gallop and no friction rub. No murmur heard. Pulmonary/Chest: Effort normal and breath sounds normal. No respiratory distress. He has no wheezes. He has no rales. He exhibits no tenderness. Abdominal: Soft. Bowel sounds are normal. He exhibits no distension and no mass. There is no tenderness. There is no rebound and no guarding. Genitourinary:   Genitourinary Comments: Pt denies urinary/ Testicular/ scrotal or penile  complaints   Musculoskeletal: Normal range of motion. He exhibits tenderness. He exhibits no edema. Noted R heel ulceration (drainiing); pt has neuropathy at baseline; noted swelling of foot; CR at 5 sec;     L heel - noted large blister on heel; also neuropathic; L > R foot swelling; CR at 5 sec;    Lymphadenopathy:     He has no cervical adenopathy. Neurological: He is alert and oriented to person, place, and time. He has normal reflexes. No cranial nerve deficit. Coordination normal.   Skin: Skin is warm and dry. Rash noted. No erythema. Noted diffuse petichial/ m-p rash; (+) itches;    Psychiatric: He has a normal mood and affect. Nursing note and vitals reviewed. [facility]  ED Course       Procedures    Chief Complaint   Patient presents with    Allergic Reaction       3:53 PM  The patients presenting problems have been discussed, and they are in agreement with the care plan formulated and outlined with them. I have encouraged them to ask questions as they arise throughout their visit.     MEDICATIONS GIVEN:  Medications   famotidine (PF) (PEPCID) injection 20 mg (20 mg IntraVENous Given 2/17/17 1542)   diphenhydrAMINE (BENADRYL) injection 25 mg ( IntraVENous Given 2/17/17 1542)   methylPREDNISolone (PF) (SOLU-MEDROL) injection 125 mg (125 mg IntraVENous Given 2/17/17 1544)       LABS REVIEWED:  Labs Reviewed   TROPONIN I   URINALYSIS W/MICROSCOPIC   METABOLIC PANEL, COMPREHENSIVE   CK W/ CKMB & INDEX   CBC WITH AUTOMATED DIFF       RADIOLOGY RESULTS:  The following have been ordered and reviewed:  _____________________________________________________________________  _____________________________________________________________________        PROCEDURES:        CONSULTATIONS:       PROGRESS NOTES:      DIAGNOSIS:    No diagnosis found. PLAN:  1-      ED COURSE: The patients hospital course has been uncomplicated. Patient is being evaluated for admission to the hospital by the [facility]. The results of their tests and reasons for their admission have been discussed with them and/or available family. They convey agreement and understanding for the need to be admitted and for their admission diagnosis. Consultation has been made with the inpatient physician specialist for hospitalization.

## 2017-02-17 NOTE — ED TRIAGE NOTES
Per family pt has been on Oxycodone and Gabapentin x3 weeks. Pt reports generalized skin rash since Tuesday night. Last dose was Wednesday AM. Pt denies difficulty swallowing or SOB.

## 2017-02-17 NOTE — IP AVS SNAPSHOT
Current Discharge Medication List  
  
Take these medications at their scheduled times Dose & Instructions Dispensing Information Comments Morning Noon Evening Bedtime ALEVE 220 mg tablet Generic drug:  naproxen sodium Your next dose is: Today, Tomorrow Other:  ____________ Dose:  220 mg Take 220 mg by mouth two (2) times daily (with meals). Refills:  0  
     
   
   
   
  
 aspirin delayed-release 81 mg tablet Your next dose is: Today, Tomorrow Other:  ____________ Dose:  81 mg Take 1 Tab by mouth daily. Refills:  0  
     
   
   
   
  
 atenolol 50 mg tablet Commonly known as:  TENORMIN Your next dose is: Today, Tomorrow Other:  ____________ Dose:  50 mg Take 1 Tab by mouth daily. Quantity:  90 Tab Refills:  3 CENTRUM SILVER Tab tablet Generic drug:  multivitamins-minerals-lutein Your next dose is: Today, Tomorrow Other:  ____________ Dose:  1 Tab Take 1 Tab by mouth daily. Refills:  0  
     
   
   
   
  
 donepezil 10 mg tablet Commonly known as:  ARICEPT Your next dose is: Today, Tomorrow Other:  ____________ Dose:  10 mg Take 1 Tab by mouth daily. Quantity:  90 Tab Refills:  3  
     
   
   
   
  
 hydroCHLOROthiazide 25 mg tablet Commonly known as:  HYDRODIURIL Your next dose is: Today, Tomorrow Other:  ____________ Dose:  25 mg Take 1 Tab by mouth daily. Quantity:  90 Tab Refills:  3  
     
   
   
   
  
 latanoprost 0.005 % ophthalmic solution Commonly known as:  Richardine Solum Your next dose is: Today, Tomorrow Other:  ____________ Dose:  1 Drop Administer 1 Drop to right eye nightly. Refills:  0  
     
   
   
   
  
 lisinopril 5 mg tablet Commonly known as:  Priscila Nails Your next dose is: Today, Tomorrow Other:  ____________ Dose:  5 mg Take 1 Tab by mouth daily. Quantity:  30 Tab Refills:  5 LORazepam 0.5 mg tablet Commonly known as:  ATIVAN Your next dose is: Today, Tomorrow Other:  ____________ Dose:  0.5 mg Take 0.5 mg by mouth every evening. Refills:  0  
     
   
   
   
  
 predniSONE 20 mg tablet Commonly known as:  Magy Roers Your next dose is: Today, Tomorrow Other:  ____________ Dose:  60 mg Take 3 Tabs by mouth daily. Quantity:  3 Tab Refills:  0  
     
   
   
   
  
 * RisperDAL 1 mg tablet Generic drug:  risperiDONE Your next dose is: Today, Tomorrow Other:  ____________ Dose:  1 mg Take 1 mg by mouth daily. Refills:  0  
     
   
   
   
  
 * RisperDAL 1 mg tablet Generic drug:  risperiDONE Your next dose is: Today, Tomorrow Other:  ____________ Dose:  2 mg Take 2 mg by mouth every evening. Refills:  0  
     
   
   
   
  
 sertraline 100 mg tablet Commonly known as:  ZOLOFT Your next dose is: Today, Tomorrow Other:  ____________ Dose:  150 mg Take 1.5 Tabs by mouth daily. Quantity:  45 Tab Refills:  11  
     
   
   
   
  
 triamcinolone acetonide 0.1 % topical cream  
Commonly known as:  KENALOG Your next dose is: Today, Tomorrow Other:  ____________ Dose:  28.4 g Apply 28.4 g to affected area two (2) times a day. use thin layer Quantity:  15 g Refills:  0  
     
   
   
   
  
 VITAMIN D3 1,000 unit tablet Generic drug:  cholecalciferol Your next dose is: Today, Tomorrow Other:  ____________ Dose:  1000 Units Take 1,000 Units by mouth daily. Indications: VITAMIN D DEFICIENCY Refills:  0  
     
   
   
   
  
 * Notice:   This list has 2 medication(s) that are the same as other medications prescribed for you. Read the directions carefully, and ask your doctor or other care provider to review them with you. Take these medications as needed Dose & Instructions Dispensing Information Comments Morning Noon Evening Bedtime  
 meclizine 12.5 mg tablet Commonly known as:  ANTIVERT Your next dose is: Today, Tomorrow Other:  ____________ Dose:  12.5 mg Take 1 Tab by mouth three (3) times daily as needed. Quantity:  90 Tab Refills:  4 Take these medications as directed Dose & Instructions Dispensing Information Comments Morning Noon Evening Bedtime  
 omeprazole 40 mg capsule Commonly known as:  PRILOSEC Your next dose is: Today, Tomorrow Other:  ____________ TAKE ONE CAPSULE BY MOUTH ONCE DAILY FOR  ACID  REFLUX Quantity:  90 Cap Refills:  3  
     
   
   
   
  
 simvastatin 40 mg tablet Commonly known as:  ZOCOR Your next dose is: Today, Tomorrow Other:  ____________ TAKE 1 TABLET BY MOUTH NIGHTLY Quantity:  90 Tab Refills:  3  
     
   
   
   
  
 traMADol 50 mg tablet Commonly known as:  ULTRAM  
   
Your next dose is: Today, Tomorrow Other:  ____________ TAKE ONE TABLET BY MOUTH EVERY 8 HOURS AS NEEDED FOR PAIN MAX  DAILY  AMOUNT  150MG Quantity:  90 Tab Refills:  0  
     
   
   
   
  
 traZODone 50 mg tablet Commonly known as:  Madolyn Saas Your next dose is: Today, Tomorrow Other:  ____________ TAKE 1 TABLET BY MOUTH NIGHTLY FOR SLEEP Quantity:  30 Tab Refills:  5 Where to Get Your Medications Information about where to get these medications is not yet available !  Ask your nurse or doctor about these medications  
  predniSONE 20 mg tablet  
 triamcinolone acetonide 0.1 % topical cream

## 2017-02-17 NOTE — TELEPHONE ENCOUNTER
Spoke with Sera Scott. She states that patients rash is much worse and that he is having a lot of itching. Per Dr. Jordy Monteiro: He needs to go to the ER to be evaluated. Spoke with patients wife who verbalized understanding and agreed to take patient to [address] [place] [address].

## 2017-02-17 NOTE — IP AVS SNAPSHOT
[address] 
[phone] Patient: Alice Frazier MRN: [id number] You are allergic to the following Allergen Reactions Hydrocodone-Acetaminophen Rash Morbiliform exanthem rash Morphine Rash Erythromycin Nausea and Vomiting  
    
 Sulfa (Sulfonamide Antibiotics) Rash Adhesive Tape-Silicones Hives Cinnamon Nausea Only Codeine Other (comments)  
 hallucinations Shellfish Derived Nausea and Vomiting Recent Documentation Height Weight BMI Smoking Status 1.638 m 80.7 kg 30.08 kg/m2 Never Smoker Emergency Contacts Name Discharge Info Relation Home Work Mobile Pamela Olmedo CAREGIVER [3] Spouse [3] [phone] [address] [facility] [place] CAREGIVER [3] Daughter [21] [phone] About your hospitalization You were admitted on:  February 17, 2017 You last received care in the:  [facility] 5M1 MED SURG 1 You were discharged on:  February 19, 2017 Unit phone number:  [phone] Why you were hospitalized Your primary diagnosis was:  Rash And Nonspecific Skin Eruption Your diagnoses also included:  Pure Hypercholesterolemia, Essential Hypertension, Benign, Spinal Stenosis, Unspecified Region Other Than Cervical, Positional Vertigo, Late Onset Alzheimer's Disease With Behavioral Disturbance, Controlled Type 2 Diabetes Mellitus With Skin Complication, Without Long-Term Current Use Of Insulin (Hcc) Providers Seen During Your Hospitalizations Provider Role Specialty Primary office phone Ella Vasquez MD Attending Provider Emergency Medicine [phone] Ravi Hawk DO Attending Provider [facility] [phone] Your Primary Care Physician (PCP) Primary Care Physician Office Phone Office Fax Wilfrid Lorenzo [phone] Follow-up Information Follow up With Details Comments Contact Info Demetri Suazo MD Schedule an appointment as soon as possible for a visit in 3 days hospital follow-up [address] [facility] [address] [place] [address] 
[phone] Your Appointments Wednesday March 01, 2017 10:50 AM EST  
SAME DAY SICK with Demetri Suazo MD  
[address]) [address] [address]  
[phone] Tuesday April 04, 2017 10:30 AM EDT ROUTINE CARE with Demetri Suazo MD  
704 [facility] ([address] [place] [address]) [address] [address]  
[phone] Current Discharge Medication List  
  
START taking these medications Dose & Instructions Dispensing Information Comments Morning Noon Evening Bedtime  
 predniSONE 20 mg tablet Commonly known as:  Judi Pound Your next dose is: Today, Tomorrow Other:  _________ Dose:  60 mg Take 3 Tabs by mouth daily. Quantity:  3 Tab Refills:  0  
     
   
   
   
  
 triamcinolone acetonide 0.1 % topical cream  
Commonly known as:  KENALOG Your next dose is: Today, Tomorrow Other:  _________ Dose:  28.4 g Apply 28.4 g to affected area two (2) times a day. use thin layer Quantity:  15 g Refills:  0 CONTINUE these medications which have CHANGED Dose & Instructions Dispensing Information Comments Morning Noon Evening Bedtime  
 traMADol 50 mg tablet Commonly known as:  ULTRAM  
What changed:   
- how much to take - when to take this 
- additional instructions Your next dose is: Today, Tomorrow Other:  _________ TAKE ONE TABLET BY MOUTH EVERY 8 HOURS AS NEEDED FOR PAIN MAX  DAILY  AMOUNT  150MG Quantity:  90 Tab Refills:  0 CONTINUE these medications which have NOT CHANGED Dose & Instructions Dispensing Information Comments Morning Noon Evening Bedtime ALEVE 220 mg tablet Generic drug:  naproxen sodium Your next dose is: Today, Tomorrow Other:  _________ Dose:  220 mg Take 220 mg by mouth two (2) times daily (with meals). Refills:  0  
     
   
   
   
  
 aspirin delayed-release 81 mg tablet Your next dose is: Today, Tomorrow Other:  _________ Dose:  81 mg Take 1 Tab by mouth daily. Refills:  0  
     
   
   
   
  
 atenolol 50 mg tablet Commonly known as:  TENORMIN Your next dose is: Today, Tomorrow Other:  _________ Dose:  50 mg Take 1 Tab by mouth daily. Quantity:  90 Tab Refills:  3 CENTRUM SILVER Tab tablet Generic drug:  multivitamins-minerals-lutein Your next dose is: Today, Tomorrow Other:  _________ Dose:  1 Tab Take 1 Tab by mouth daily. Refills:  0  
     
   
   
   
  
 donepezil 10 mg tablet Commonly known as:  ARICEPT Your next dose is: Today, Tomorrow Other:  _________ Dose:  10 mg Take 1 Tab by mouth daily. Quantity:  90 Tab Refills:  3  
     
   
   
   
  
 hydroCHLOROthiazide 25 mg tablet Commonly known as:  HYDRODIURIL Your next dose is: Today, Tomorrow Other:  _________ Dose:  25 mg Take 1 Tab by mouth daily. Quantity:  90 Tab Refills:  3  
     
   
   
   
  
 latanoprost 0.005 % ophthalmic solution Commonly known as:  Kaylen Furbish Your next dose is: Today, Tomorrow Other:  _________ Dose:  1 Drop Administer 1 Drop to right eye nightly. Refills:  0  
     
   
   
   
  
 lisinopril 5 mg tablet Commonly known as:  Robertha Roup Your next dose is: Today, Tomorrow Other:  _________ Dose:  5 mg Take 1 Tab by mouth daily. Quantity:  30 Tab Refills:  5 LORazepam 0.5 mg tablet Commonly known as:  ATIVAN Your next dose is: Today, Tomorrow Other:  _________ Dose:  0.5 mg Take 0.5 mg by mouth every evening. Refills:  0  
     
   
   
   
  
 meclizine 12.5 mg tablet Commonly known as:  ANTIVERT Your next dose is: Today, Tomorrow Other:  _________ Dose:  12.5 mg Take 1 Tab by mouth three (3) times daily as needed. Quantity:  90 Tab Refills:  4  
     
   
   
   
  
 omeprazole 40 mg capsule Commonly known as:  PRILOSEC Your next dose is: Today, Tomorrow Other:  _________ TAKE ONE CAPSULE BY MOUTH ONCE DAILY FOR  ACID  REFLUX Quantity:  90 Cap Refills:  3  
     
   
   
   
  
 * RisperDAL 1 mg tablet Generic drug:  risperiDONE Your next dose is: Today, Tomorrow Other:  _________ Dose:  1 mg Take 1 mg by mouth daily. Refills:  0  
     
   
   
   
  
 * RisperDAL 1 mg tablet Generic drug:  risperiDONE Your next dose is: Today, Tomorrow Other:  _________ Dose:  2 mg Take 2 mg by mouth every evening. Refills:  0  
     
   
   
   
  
 sertraline 100 mg tablet Commonly known as:  ZOLOFT Your next dose is: Today, Tomorrow Other:  _________ Dose:  150 mg Take 1.5 Tabs by mouth daily. Quantity:  45 Tab Refills:  11  
     
   
   
   
  
 simvastatin 40 mg tablet Commonly known as:  ZOCOR Your next dose is: Today, Tomorrow Other:  _________ TAKE 1 TABLET BY MOUTH NIGHTLY Quantity:  90 Tab Refills:  3  
     
   
   
   
  
 traZODone 50 mg tablet Commonly known as:  Thom Crockett Your next dose is: Today, Tomorrow Other:  _________ TAKE 1 TABLET BY MOUTH NIGHTLY FOR SLEEP Quantity:  30 Tab Refills:  5 VITAMIN D3 1,000 unit tablet Generic drug:  cholecalciferol Your next dose is: Today, Tomorrow Other:  _________ Dose:  1000 Units Take 1,000 Units by mouth daily. Indications: VITAMIN D DEFICIENCY Refills:  0  
     
   
   
   
  
 * Notice: This list has 2 medication(s) that are the same as other medications prescribed for you. Read the directions carefully, and ask your doctor or other care provider to review them with you. Where to Get Your Medications Information on where to get these meds will be given to you by the nurse or doctor. ! Ask your nurse or doctor about these medications  
  predniSONE 20 mg tablet  
 triamcinolone acetonide 0.1 % topical cream  
  
  
 
  
  
Discharge Instructions HOME DISCHARGE INSTRUCTIONS Olean Hammans / [id number] : 1945 Admission date: 2017 Discharge date: 2017 Please bring this form with you to show your care provider at your follow-up appointment. Primary care provider:  Anjana Forman MD 
 
Discharging provider:  Severo Arch, MD  - Family Medicine Resident Olivier Holly DO- Attending, Family Medicine You have been admitted to the hospital with the following diagnoses: 
 
ACUTE DIAGNOSES: 
Rash and nonspecific skin eruption Shireen Joseph . . . . . . . . . . . . . . . . . . . . . . . . . . . . . . . . . . . . . . . . . . . . . . . . . . . . . . . . . . . . . . . . . . . . . . . Medications: 
-START Prednisone 60mg one tab daily for 3 more days, use the kenalog cream no more than twice daily for itching as needed -STOP taking Hydrocodone and  Gabapentin FOLLOW-UP CARE RECOMMENDATIONS: 
 
Appointments Follow-up Information Follow up With Details Comments Contact Info Anjana Forman MD Schedule an appointment as soon as possible for a visit in 3 days hospital follow-up [address] [facility] [address] [place] [address] [phone] Follow-up tests needed: None Pending test results: At the time of your discharge the following test results are still pending: None Please make sure you review these results with your outpatient follow-up provider(s). Specific symptoms to watch for: chest pain, shortness of breath, fever, chills, nausea, vomiting, diarrhea, change in mentation, falling, weakness, bleeding. DIET/what to eat:  Regular Diet What to do if new or unexpected symptoms occur? If you experience any of the above symptoms (or should other concerns or questions arise after discharge) please call your primary care physician. Return to the emergency room if you cannot get hold of your doctor. · It is very important that you keep your follow-up appointment(s). · Please bring discharge papers, medication list (and/or medication bottles) to your follow-up appointments for review by your outpatient provider(s). · Please check the list of medications and be sure it includes every medication (even non-prescription medications) that your provider wants you to take. · It is important that you take the medication exactly as they are prescribed. · Keep your medication in the bottles provided by the pharmacist and keep a list of the medication names, dosages, and times to be taken in your wallet. · Do not take other medications without consulting your doctor. · If you have any questions about your medications or other instructions, please talk to your nurse or care provider before you leave the hospital.  
 
Information obtained by:  
 
I understand that if any problems occur once I am at home I am to contact my physician. These instructions were explained to me and I had the opportunity to ask questions. I understand and acknowledge receipt of the instructions indicated above. Physician's or R.N.'s Signature                                                                  Date/Time Patient or Representative Signature                                                          Date/Time Discharge Orders None General Information Please provide this summary of care documentation to your next provider. Patient Signature:  ____________________________________________________________ Date:  ____________________________________________________________  
  
[facility] Provider Signature:  ____________________________________________________________ Date:  ____________________________________________________________

## 2017-02-18 NOTE — PROGRESS NOTES
Bedside and Verbal shift change report given to Maya Vega RN (oncoming nurse) by Jaren Cabezas RN (offgoing nurse). Report included the following information SBAR, Kardex, ED Summary, Intake/Output, MAR and Recent Results.

## 2017-02-18 NOTE — H&P
[facility]   Admission H&P    Date of admission: 2/17/2017    Patient name: Juany Santos  MRN: [id number]  YOB: 1945  Age: 66 y.o. Primary care provider:  Stacey Jordan MD     Source of Information: patient, medical records    Chief complaint:  Generalized Rash    History of Present Illness  Juany Santos is a 66 y.o. male with Hx of DM, pure hypercholesterolemia, spinal stenosis, spinal stenosis, horseshoe kidney, GERD, HTN, anxiety, falls, fatigue, hearing loss, snoring, vertigo, DDD, Alzheimer's Disease, and neuropathy who presents from home via EMS with chief complaint of generalized red rash. As reported by the wife, the rash appeared on last Tuesday (02/14/17) about a week after starting on Hydrocodone and Gabapentin for neuropathy treatment. Both medications were suspended as recommended by PCP that same day. Today (02/17/17), the rash became more red and itching, and the patient was brought to the ED. Wife denies fever, dizziness, bleeding, lightheadedness, LOC, conjunctivitis, oral lesions, SOB, CP, edema, n/v, diarrhea, hematochezia/melena, hematuria, dysuria, arthralgias, or any other complains. In the ER, vital signs were remarkable for HR 51. Labs were remarkable for ABS Eosinophils 0.7 (NL 0.0 to 0.4), CRP 3.90, and ESR 21. XR of chest and both feet showed no acute findings. Pt was treated with Solumedrol, Benadryl, and IVF. Home Medications   Prior to Admission medications    Medication Sig Start Date End Date Taking? Authorizing Provider   LORazepam (ATIVAN) 0.5 mg tablet Take 0.5 mg by mouth every evening. Yes Historical Provider   risperiDONE (RISPERDAL) 1 mg tablet Take 1 mg by mouth daily. Yes Historical Provider   risperiDONE (RISPERDAL) 1 mg tablet Take 2 mg by mouth every evening.    Yes Historical Provider   omeprazole (PRILOSEC) 40 mg capsule TAKE ONE CAPSULE BY MOUTH ONCE DAILY FOR  ACID  REFLUX 2/13/17  Yes Neli Corona Blanca Mcrae MD   traMADol (ULTRAM) 50 mg tablet TAKE ONE TABLET BY MOUTH EVERY 8 HOURS AS NEEDED FOR PAIN MAX  DAILY  AMOUNT  150MG  Patient taking differently: 50 mg every eight (8) hours. TAKE ONE TABLET BY MOUTH EVERY 8 HOURS AS NEEDED FOR PAIN MAX  DAILY  AMOUNT  150MG 1/30/17  Yes Caio Cerda MD   lisinopril (PRINIVIL, ZESTRIL) 5 mg tablet Take 1 Tab by mouth daily. 12/2/16  Yes Elmo Pena MD   meclizine (ANTIVERT) 12.5 mg tablet Take 1 Tab by mouth three (3) times daily as needed. 12/2/16  Yes Elmo Pena MD   traZODone (DESYREL) 50 mg tablet TAKE 1 TABLET BY MOUTH NIGHTLY FOR SLEEP 11/25/16  Yes Elmo Pena MD   sertraline (ZOLOFT) 100 mg tablet Take 1.5 Tabs by mouth daily. 6/22/16  Yes Elmo Pena MD   simvastatin (ZOCOR) 40 mg tablet TAKE 1 TABLET BY MOUTH NIGHTLY 4/26/16  Yes Elmo Pena MD   hydrochlorothiazide (HYDRODIURIL) 25 mg tablet Take 1 Tab by mouth daily. 4/26/16  Yes Elmo Pena MD   atenolol (TENORMIN) 50 mg tablet Take 1 Tab by mouth daily. 4/26/16  Yes Elmo Pena MD   donepezil (ARICEPT) 10 mg tablet Take 1 Tab by mouth daily. 4/20/16  Yes Leilani Davalos MD   latanoprost (XALATAN) 0.005 % ophthalmic solution Administer 1 Drop to right eye nightly. Yes Historical Provider   aspirin delayed-release 81 mg tablet Take 1 Tab by mouth daily. 5/27/15  Yes Elmo Pena MD   naproxen sodium (ALEVE) 220 mg tablet Take 220 mg by mouth two (2) times daily (with meals). 9/30/10  Yes Historical Provider   multivitamins-minerals-lutein (CENTRUM SILVER) Tab Take 1 Tab by mouth daily. Yes Historical Provider   cholecalciferol, vitamin d3, (VITAMIN D) 1,000 unit tablet Take 1,000 Units by mouth daily.  Indications: VITAMIN D DEFICIENCY   Yes Historical Provider       Allergies   Allergies   Allergen Reactions    Morphine Rash    Erythromycin Nausea and Vomiting    Sulfa (Sulfonamide Antibiotics) Rash    Adhesive Tape-Silicones Hives    Cinnamon Nausea Only    Codeine Other (comments)     hallucinations    Shellfish Derived Nausea and Vomiting       Past Medical History   Diagnosis Date    Anxiety     DDD (degenerative disc disease), cervical     DDD (degenerative disc disease), lumbar     Dementia     Diabetes ([address]     Falls     Fatigue     GERD (gastroesophageal reflux disease)     Glaucoma     Glaucoma      right eye    Hearing loss     Horseshoe kidney     Hypertension     Irritable bowel syndrome     Neuropathy     Osteoarthrosis, unspecified whether generalized or localized, other specified sites     Pressure sore on heel     Pure hypercholesterolemia     Ringing in ears     Severe single current episode of major depressive disorder, with psychotic features ([address] 6/22/2016    Snoring     Spinal stenosis, unspecified region other than cervical     Vertigo        Past Surgical History   Procedure Laterality Date    Hx splenectomy  1963    Pr appendectomy      Hx hernia repair  left inguinal    Pr laminectomy,lumbar  1992    Hx cholecystectomy  3/10    Hx orthopaedic       lumbar decompression    Hx heent      Amb poc eeg / sleep deprived     Alyne Alma Rosa Hx splenectomy         Family History   Problem Relation Age of Onset    Stroke Father     Diabetes Mother     Hypertension Mother     Heart Disease Mother     Cancer Mother     Dementia Mother     Stroke Mother     Dementia Sister     Stroke Sister     Dementia Other        Social History   Patient resides  x  Independently      With family care      Assisted living      SNF      Ambulates  x  Independently      With cane       Assisted walker           Alcohol history   x  None     Social     Chronic     Smoking history  x  None     Former smoker     Current smoker     History   Smoking Status    Never Smoker   Smokeless Tobacco    Never Used       Drug history  x  None     Former drug user     Current drug user     Sexual history    Sexually active    x  Not sexually active Code status  x  Full code     DNR/DNI     Partial    Code status discussed with the patient/caregivers. Review of Systems (negative unless in bold)    Constitutional: Negative for chills, fever and weight loss. HENT: Negative for sore throat. Eyes: Negative for blurred vision and double vision. Respiratory: Negative for cough, hemoptysis and wheezing. Cardiovascular: Negative for chest pain, orthopnea and leg swelling. Gastrointestinal: Negative for abdominal pain, diarrhea, heartburn, nausea and vomiting. Genitourinary: Negative for dysuria, frequency and urgency. Musculoskeletal: Positive for joint pain. Negative for myalgias. Skin: Negative for itching and rash. Neurological: Negative for dizziness, seizures and headaches. Endo/Heme/Allergies: Positive for environmental allergies. Negative for polydipsia. Does not bruise/bleed easily. Psychiatric/Behavioral: Negative for depression and suicidal ideas. Physical Exam  Visit Vitals    /51    Pulse 71    Temp 97.8 °F (36.6 °C)    Resp 13    Ht 5' 4.5\" (1.638 m)    Wt 178 lb (80.7 kg)    SpO2 100%    BMI 30.08 kg/m2        General: No acute distress. Alert. Cooperative. Head: Normocephalic. Atraumatic. Eyes:  Conjunctiva pink. Sclera white. PERRL. Ears:  Ear canals patent. TM non-erythematous. Nose:  Septum midline. Mucosa pink. No drainage. Throat: Mucosa pink. Moist mucous membranes. No tonsillar exudates or erythema. Palate movement equal bilaterally. Neck: Supple. Normal ROM. No stiffness. No lymphadenopathy    Respiratory: CTAB. No w/r/r/c.   Cardiovascular: RRR. Normal S1,S2. No m/r/g. Pulses 2+ throughout. GI: + bowel sounds. Nontender. No rebound tenderness or guarding. Nondistended. Extremities: No edema. No palpable cord. No tenderness. Musculoskeletal: Full ROM in all extremities. Neuro: CN II-XII grossly intact. Strength 5/5 in all extremities. Sensation intact in all extremities.  DTRs 2+ throughout. Skin: Non blanching erythematous macules located in all skin, more more concentrated on the flexure areas (see below pictures). No involvement of the oral mucosa or conjunctiva. Bilateral feet ulcers. : Deferred   Rectal: Deferred                 Laboratory Data  Recent Results (from the past 24 hour(s))   TROPONIN I    Collection Time: 02/17/17  3:56 PM   Result Value Ref Range    Troponin-I, Qt. <0.04 <2.11 ng/mL   METABOLIC PANEL, COMPREHENSIVE    Collection Time: 02/17/17  3:56 PM   Result Value Ref Range    Sodium 139 136 - 145 mmol/L    Potassium 4.0 3.5 - 5.1 mmol/L    Chloride 103 97 - 108 mmol/L    CO2 31 21 - 32 mmol/L    Anion gap 5 5 - 15 mmol/L    Glucose 100 65 - 100 mg/dL    BUN 20 6 - 20 MG/DL    Creatinine 1.01 0.70 - 1.30 MG/DL    BUN/Creatinine ratio 20 12 - 20      GFR est AA >60 >60 ml/min/1.73m2    GFR est non-AA >60 >60 ml/min/1.73m2    Calcium 8.2 (L) 8.5 - 10.1 MG/DL    Bilirubin, total 0.4 0.2 - 1.0 MG/DL    ALT (SGPT) 28 12 - 78 U/L    AST (SGOT) 23 15 - 37 U/L    Alk. phosphatase 105 45 - 117 U/L    Protein, total 6.6 6.4 - 8.2 g/dL    Albumin 3.2 (L) 3.5 - 5.0 g/dL    Globulin 3.4 2.0 - 4.0 g/dL    A-G Ratio 0.9 (L) 1.1 - 2.2     CK W/ CKMB & INDEX    Collection Time: 02/17/17  3:56 PM   Result Value Ref Range     39 - 308 U/L    CK - MB 4.2 (H) <3.6 NG/ML    CK-MB Index 2.5 0 - 2.5     CBC WITH AUTOMATED DIFF    Collection Time: 02/17/17  3:56 PM   Result Value Ref Range    WBC 10.9 4.1 - 11.1 K/uL    RBC 4.01 (L) 4.10 - 5.70 M/uL    HGB 12.3 12.1 - 17.0 g/dL    HCT 36.6 36.6 - 50.3 %    MCV 91.3 80.0 - 99.0 FL    MCH 30.7 26.0 - 34.0 PG    MCHC 33.6 30.0 - 36.5 g/dL    RDW 14.3 11.5 - 14.5 %    PLATELET 041 759 - 826 K/uL    NEUTROPHILS 67 32 - 75 %    LYMPHOCYTES 19 12 - 49 %    MONOCYTES 7 5 - 13 %    EOSINOPHILS 7 0 - 7 %    BASOPHILS 0 0 - 1 %    ABS. NEUTROPHILS 7.3 1.8 - 8.0 K/UL    ABS. LYMPHOCYTES 2.1 0.8 - 3.5 K/UL    ABS.  MONOCYTES 0.8 0.0 - 1.0 K/UL ABS. EOSINOPHILS 0.7 (H) 0.0 - 0.4 K/UL    ABS. BASOPHILS 0.0 0.0 - 0.1 K/UL   C REACTIVE PROTEIN, QT    Collection Time: 02/17/17  3:56 PM   Result Value Ref Range    C-Reactive protein 3.90 (H) <0.60 mg/dL   SED RATE (ESR)    Collection Time: 02/17/17  3:56 PM   Result Value Ref Range    Sed rate, automated 21 (H) 0 - 20 mm/hr   URINALYSIS W/MICROSCOPIC    Collection Time: 02/17/17  5:07 PM   Result Value Ref Range    Color YELLOW/STRAW      Appearance CLEAR CLEAR      Specific gravity 1.013 1.003 - 1.030      pH (UA) 7.0 5.0 - 8.0      Protein NEGATIVE  NEG mg/dL    Glucose NEGATIVE  NEG mg/dL    Ketone NEGATIVE  NEG mg/dL    Bilirubin NEGATIVE  NEG      Blood NEGATIVE  NEG      Urobilinogen 0.2 0.2 - 1.0 EU/dL    Nitrites NEGATIVE  NEG      Leukocyte Esterase NEGATIVE  NEG      WBC 0-4 0 - 4 /hpf    RBC 0-5 0 - 5 /hpf    Epithelial cells FEW FEW /lpf    Bacteria NEGATIVE  NEG /hpf     Imaging    EXAM: XR FOOT LT MIN 3 V     INDICATION: Left heel soft tissue ulceration.     COMPARISON: None.     FINDINGS: Three views of the left foot demonstrate subtle lucency in the soft  tissues posterior to the distal Achilles insertion. No radiodense foreign body. Bone mineralization is age appropriate. No acute fracture or dislocation. No  evidence of osteomyelitis. Mild first MTP and MTS joint osteoarthritis. Soft  tissue calcification medial to the first metatarsal head may represent chronic  gouty tophus.     IMPRESSION  IMPRESSION:      No fracture, radiodense foreign body, or evidence of osteomyelitis. Probable  chronic gout of the first MTP joint. EXAM: XR FOOT RT MIN 3 V     INDICATION: Right heel ulceration.     COMPARISON: None.     FINDINGS: Three views of the right foot demonstrate possible soft tissue  breakdown superficial to the Achilles insertion on the calcaneus. No underlying  bone abnormality. Bone mineralization is age appropriate. No acute fracture or  dislocation. No evidence of osteomyelitis. Joints are age-appropriate without  erosion. No radiodense foreign body.     IMPRESSION  IMPRESSION:      No fracture, radiodense foreign body, or evidence of osteomyelitis. EXAM: XR CHEST PORT     INDICATION: Chest Pain, diabetes and hypercholesterolemia     COMPARISON: 1/23/2010     FINDINGS: A portable AP radiograph of the chest was obtained at 1609 hours. The  patient is on a cardiac monitor.     The heart size is normal.     The patient effected a slightly shallow depth inspiration and there is elevation  left hemidiaphragm. This is new in the interval when compared to previous exam.  This could be due to paralysis of the hemidiaphragm.     There is no pneumonia or pulmonary edema.     IMPRESSION  IMPRESSION:  1. Lungs are clear  2. New elevation left hemidiaphragm could be due to paralysis. Clinical  correlation is suggested    EKG: Rhythm: normal sinus rhythm; and regular. Axis: normal; P wave: normal; QRS interval: normal ; ST/T wave: normal; Negative acute significant segmental elevations     Assessment and Plan   Dinorah Duenas is a 66 y.o. male who is admitted for Generalized Rash. Generalized Rash - Suspected etiology is drug induced (Exanthematous Drug Eruption), possibly Hydrocodone or Gabapentin (last dose on 02/14/17) since these medications were started about a week before the presentation of symptoms, and the patient have a Hx of morphine allergy with rash. Elevations in ABS Eosinophils 0.7 (NL 0.0 to 0.4), CRP 3.90, and ESR 21, and no evidence of an active infection, are more consistent with RUTHANN. Usual resolution of RUTHANN rash is within a week of last dose of offending medication.  Other possible etiologies include vasculitis, viral or bacterial exanthemas, autoimmune disease, or other kind of allergic reaction.      - Hold hold home medications unless acutely needed  - Benadryl 50 mg PO Q6H  - Prednisone 60 mg PO qDay    Hypertension - Current BP: 111/51  - Continue Lisinopril and Atenolol if needed   - Hold HCTZ    Hyperlipidemia - Lipid Panel 12/02/16. Tchol: 149 LDL: 66 HDL: 60 Trigs: 113  - Resume Simvastatin if needed    Prediabetes - Last A1C 6.3 on 12/3/16. POA Glu 100  - Diet control    Alzheimer's Dementia  - Restart Donepezil    Anxiety and Agitation  - Continue Trazodone  - Resume Ativan, Zoloft, and Risperidone if needed    Chronic Pain due to Spinal Stenosis   - Resume home Tramadol if needed    Positional Vertigo  - Resume Antivert if needed    GERD  - Protonix    Feet Ulcers  - Skin Care Consult    FEN/GI - Diabetic Diet. Activity - Ambulate with assistance   DVT prophylaxis - Lovenox  GI prophylaxis -  Protonix  Disposition - Plan to d/c to Home.      CODE STATUS:  FULL CODE       Patient to be discussed with Dr. Caryle Erichsen, [address] [place] [address] Problems  Date Reviewed: 2/15/2017          Codes Class Noted POA    Rash and nonspecific skin eruption ICD-10-CM: R21  ICD-9-CM: 782.1  2/17/2017 Unknown

## 2017-02-18 NOTE — PROGRESS NOTES
Problem: Mobility Impaired (Adult and Pediatric)  Goal: *Acute Goals and Plan of Care (Insert Text)  Physical Therapy Goals  Initiated 2/18/2017  1. Patient will perform all bed mobility with modified independence within 7 day(s). 2. Patient will transfer from bed to chair and chair to bed with supervision/set-up using the least restrictive device within 7 day(s). 3. Patient will perform sit to stand with supervision/set-up within 7 day(s). 4. Patient will ambulate with supervision/set-up for 150 feet with the least restrictive device within 7 day(s). 5. Patient will ascend/descend 3 stairs with 1 handrail(s) with minimal assistance/contact guard assist within 7 day(s). PHYSICAL THERAPY EVALUATION  Patient: Zoe Sullivan (66 y.o. male)  Date: 2/18/2017  Primary Diagnosis: Rash and nonspecific skin eruption        Precautions: Fall         ASSESSMENT :  Based on the objective data described below, the patient presents with decreased LE strength and overall reduced activity tolerance compounded with dementia that requires additional assistance from caregivers for his ADLs and iADLs. Mr. Jignesh Garcia was cooperative and able to follow all commands but exhibited generalized weakness in LEs and with standing activity/ambulation. Patient would benefit from skilled PT in the acute setting as well as continued therapy in the home setting upon discharge. Patient was getting home health PT/OT and speech prior to admission and would benefit from resumption of these services upon discharge. His wife prefers home health vs. SNF due to his Alzheimer's and difficulty in new environments. Based on his mild deficits requiring only CGA, she should be able to assist him at home with his mobility and self-care needs. Patient will benefit from skilled intervention to address the above impairments.   Patients rehabilitation potential is considered to be Good  Factors which may influence rehabilitation potential include:   [ ] None noted  [X]         Mental ability/status  [ ]         Medical condition  [ ]         Home/family situation and support systems  [ ]         Safety awareness  [ ]         Pain tolerance/management  [ ]         Other:        PLAN :  Recommendations and Planned Interventions:  [X]           Bed Mobility Training             [X]    Neuromuscular Re-Education  [X]           Transfer Training                   [ ]    Orthotic/Prosthetic Training  [X]           Gait Training                         [ ]    Modalities  [X]           Therapeutic Exercises           [ ]    Edema Management/Control  [X]           Therapeutic Activities            [X]    Patient and Family Training/Education  [ ]           Other (comment):     Frequency/Duration: Patient will be followed by physical therapy  5 times a week to address goals. Discharge Recommendations: Home Health  Further Equipment Recommendations for Discharge: TBD       SUBJECTIVE:   Patient stated I've got this rash that's been itchy.       OBJECTIVE DATA SUMMARY:   HISTORY:    Past Medical History   Diagnosis Date    Anxiety      DDD (degenerative disc disease), cervical      DDD (degenerative disc disease), lumbar      Dementia      Diabetes ([address]      Falls      Fatigue      GERD (gastroesophageal reflux disease)      Glaucoma      Glaucoma         right eye    Hearing loss      Horseshoe kidney      Hypertension      Irritable bowel syndrome      Neuropathy      Osteoarthrosis, unspecified whether generalized or localized, other specified sites      Pressure sore on heel      Pure hypercholesterolemia      Ringing in ears      Severe single current episode of major depressive disorder, with psychotic features ([address] 6/22/2016    Snoring      Spinal stenosis, unspecified region other than cervical      Vertigo       Past Surgical History   Procedure Laterality Date    Hx splenectomy   1963    Pr appendectomy        Hx hernia repair left inguinal    Pr laminectomy,lumbar   1992    Hx cholecystectomy   3/10    Hx orthopaedic           lumbar decompression    Hx heent        Amb poc eeg / sleep deprived        Hx splenectomy         Prior Level of Function/Home Situation:   Personal factors and/or comorbidities impacting plan of care:      Home Situation  Home Environment: Private residence  # Steps to Enter: 3  One/Two Story Residence: One story  Living Alone: No  Support Systems: [employer], Family member(s), Friends \ neighbors, Spouse/Significant Other/Partner  Patient Expects to be Discharged to[de-identified] Private residence  Current DME Used/Available at Home: Walker, rolling     EXAMINATION/PRESENTATION/DECISION MAKING:   Critical Behavior:  Neurologic State: Alert  Orientation Level: Oriented X4  Cognition: Appropriate decision making, Appropriate for age attention/concentration, Appropriate safety awareness, Follows commands     Skin:  Covered in red bump rash  Strength:    Strength: Generally decreased, functional                    Tone & Sensation:   Tone: Normal              Sensation: Impaired (LEs)               Range Of Motion:  AROM: Generally decreased, functional                       Coordination:  Coordination: Within functional limits     Functional Mobility:  Bed Mobility:  Rolling: Stand-by asssistance  Supine to Sit: Minimum assistance;Assist x1     Scooting: Contact guard assistance;Assist x1;Additional time  Transfers:  Sit to Stand: Assist x1;Additional time;Contact guard assistance  Stand to Sit: Assist x1;Contact guard assistance        Bed to Chair: Assist x1;Contact guard assistance              Balance:   Sitting: Intact; Without support  Standing: Intact; With support  Standing - Static: Good  Standing - Dynamic : Fair  Ambulation/Gait Training:  Distance (ft): 75 Feet (ft)  Assistive Device: Gait belt;Walker, rolling  Ambulation - Level of Assistance: Contact guard assistance;Assist x1     Gait Description (WDL): Exceptions to WDL  Gait Abnormalities: Shuffling gait; Decreased step clearance              Speed/Noni: Slow  Step Length: Left shortened;Right shortened                                           Stairs:  NT                          Therapeutic Exercises:   Deferred     Functional Measure:  Barthel Index:      Bathin  Bladder: 10  Bowels: 10  Groomin  Dressin  Feedin  Mobility: 0  Stairs: 0  Toilet Use: 5  Transfer (Bed to Chair and Back): 10  Total: 50         Barthel and G-code impairment scale:  Percentage of impairment CH  0% CI  1-19% CJ  20-39% CK  40-59% CL  60-79% CM  80-99% CN  100%   Barthel Score 0-100 100 99-80 79-60 59-40 20-39 1-19    0   Barthel Score 0-20 20 17-19 13-16 9-12 5-8 1-4 0      The Barthel ADL Index: Guidelines  1. The index should be used as a record of what a patient does, not as a record of what a patient could do. 2. The main aim is to establish degree of independence from any help, physical or verbal, however minor and for whatever reason. 3. The need for supervision renders the patient not independent. 4. A patient's performance should be established using the best available evidence. Asking the patient, friends/relatives and nurses are the usual sources, but direct observation and common sense are also important. However direct testing is not needed. 5. Usually the patient's performance over the preceding 24-48 hours is important, but occasionally longer periods will be relevant. 6. Middle categories imply that the patient supplies over 50 per cent of the effort. 7. Use of aids to be independent is allowed. Cindy Almendarez., Barthel, D.W. (0930). Functional evaluation: the Barthel Index. [facility] (14)2. Angus Lr michaelle CHASTITY Duarte, Allen Spicer., Breanne Sanderson., [place], [address] [place] [address] ().  Measuring the change indisability after inpatient rehabilitation; comparison of the responsiveness of the Barthel Index and Functional Coffee Measure. Journal of Neurology, Neurosurgery, and Psychiatry, 66(4), 602-533. ROMAN Sanders.JEANNIE, TAN Evans, & Tanya Cristobal M.A. (2004.) Assessment of post-stroke quality of life in cost-effectiveness studies: The usefulness of the Barthel Index and the EuroQoL-5D. Quality of Life Research, 13, 380-17            G codes: In compliance with CMSs Claims Based Outcome Reporting, the following G-code set was chosen for this patient based on their primary functional limitation being treated: The outcome measure chosen to determine the severity of the functional limitation was the Barthel with a score of 50/100 which was correlated with the impairment scale. · Mobility - Walking and Moving Around:               - CURRENT STATUS:    CK - 40%-59% impaired, limited or restricted               - GOAL STATUS:           CJ - 20%-39% impaired, limited or restricted               - D/C STATUS:                       CI - 1%-19% impaired, limited or restricted      Physical Therapy Evaluation Charge Determination   History Examination Presentation Decision-Making   MEDIUM  Complexity : 1-2 comorbidities / personal factors will impact the outcome/ POC  MEDIUM Complexity : 3 Standardized tests and measures addressing body structure, function, activity limitation and / or participation in recreation  LOW Complexity : Stable, uncomplicated  Other outcome measures Barthel 50  MEDIUM      Based on the above components, the patient evaluation is determined to be of the following complexity level: LOW      Pain:  Pain Scale 1: Numeric (0 - 10)  Pain Intensity 1: 0              Activity Tolerance:   Reduced from baseline, 75' ambulation  Please refer to the flowsheet for vital signs taken during this treatment.   After treatment:   [X]         Patient left in no apparent distress sitting up in chair  [ ]         Patient left in no apparent distress in bed  [X]         Call bell left within reach  [X] Nursing notified  [X]         Caregiver present  [ ]         Bed alarm activated      COMMUNICATION/EDUCATION:   The patients plan of care was discussed with: Registered Nurse.  [X]         Fall prevention education was provided and the patient/caregiver indicated understanding. [ ]         Patient/family have participated as able in goal setting and plan of care. [X]         Patient/family agree to work toward stated goals and plan of care. [ ]         Patient understands intent and goals of therapy, but is neutral about his/her participation. [ ]         Patient is unable to participate in goal setting and plan of care.      Thank you for this referral.  Amelia Espinal, PT   Time Calculation: 20 mins

## 2017-02-18 NOTE — PROGRESS NOTES
Brief Senior Resident Note:    HPI  Patient started with rash on Tuesday. Saw Dr. Opal Clemons on Wednesday who recommeded stopping gabapentin and hydrocodone, both had been started 1 week prior. The rash is very itchy. No oozing or pus or crusting. Had previous episode like this ~10 years ago after a back surgery in response to morphine, however that rash went away immediately after \"a shot. \"  Mrs. Gil Miller is present and she notes that his rash seems a little more red today than before, but the number of spots and distribution is the same. Vitals   Blood pressure 111/51, pulse 61, temperature 97.8 °F (36.6 °C), resp. rate 13, height 5' 4.5\" (1.638 m), weight 178 lb (80.7 kg), SpO2 100 %. Physical Exam  Physical Exam   Constitutional: He is oriented to person, place, and time. He appears well-developed and well-nourished. No distress. Eyes: No scleral icterus. Cardiovascular: Normal rate and regular rhythm. Exam reveals no gallop. No murmur heard. Pulmonary/Chest: Effort normal. No respiratory distress. He has no wheezes. He has no rales. Abdominal: Soft. He exhibits no distension. There is no tenderness. Neurological: He is alert and oriented to person, place, and time. Skin: He is not diaphoretic. Morbilliform rash. Red. Back, abdomen, arms, legs, ankles, dorsal aspect of hands and feet. Not on face. Not raised. Confluent at ankles, but mostly classically morbilliform. Non-blanching. Psychiatric: He has a normal mood and affect.  His behavior is normal.     Recent Results (from the past 12 hour(s))   TROPONIN I    Collection Time: 02/17/17  3:56 PM   Result Value Ref Range    Troponin-I, Qt. <0.04 <8.09 ng/mL   METABOLIC PANEL, COMPREHENSIVE    Collection Time: 02/17/17  3:56 PM   Result Value Ref Range    Sodium 139 136 - 145 mmol/L    Potassium 4.0 3.5 - 5.1 mmol/L    Chloride 103 97 - 108 mmol/L    CO2 31 21 - 32 mmol/L    Anion gap 5 5 - 15 mmol/L    Glucose 100 65 - 100 mg/dL BUN 20 6 - 20 MG/DL    Creatinine 1.01 0.70 - 1.30 MG/DL    BUN/Creatinine ratio 20 12 - 20      GFR est AA >60 >60 ml/min/1.73m2    GFR est non-AA >60 >60 ml/min/1.73m2    Calcium 8.2 (L) 8.5 - 10.1 MG/DL    Bilirubin, total 0.4 0.2 - 1.0 MG/DL    ALT (SGPT) 28 12 - 78 U/L    AST (SGOT) 23 15 - 37 U/L    Alk. phosphatase 105 45 - 117 U/L    Protein, total 6.6 6.4 - 8.2 g/dL    Albumin 3.2 (L) 3.5 - 5.0 g/dL    Globulin 3.4 2.0 - 4.0 g/dL    A-G Ratio 0.9 (L) 1.1 - 2.2     CK W/ CKMB & INDEX    Collection Time: 02/17/17  3:56 PM   Result Value Ref Range     39 - 308 U/L    CK - MB 4.2 (H) <3.6 NG/ML    CK-MB Index 2.5 0 - 2.5     CBC WITH AUTOMATED DIFF    Collection Time: 02/17/17  3:56 PM   Result Value Ref Range    WBC 10.9 4.1 - 11.1 K/uL    RBC 4.01 (L) 4.10 - 5.70 M/uL    HGB 12.3 12.1 - 17.0 g/dL    HCT 36.6 36.6 - 50.3 %    MCV 91.3 80.0 - 99.0 FL    MCH 30.7 26.0 - 34.0 PG    MCHC 33.6 30.0 - 36.5 g/dL    RDW 14.3 11.5 - 14.5 %    PLATELET 842 770 - 397 K/uL    NEUTROPHILS 67 32 - 75 %    LYMPHOCYTES 19 12 - 49 %    MONOCYTES 7 5 - 13 %    EOSINOPHILS 7 0 - 7 %    BASOPHILS 0 0 - 1 %    ABS. NEUTROPHILS 7.3 1.8 - 8.0 K/UL    ABS. LYMPHOCYTES 2.1 0.8 - 3.5 K/UL    ABS. MONOCYTES 0.8 0.0 - 1.0 K/UL    ABS. EOSINOPHILS 0.7 (H) 0.0 - 0.4 K/UL    ABS.  BASOPHILS 0.0 0.0 - 0.1 K/UL   C REACTIVE PROTEIN, QT    Collection Time: 02/17/17  3:56 PM   Result Value Ref Range    C-Reactive protein 3.90 (H) <0.60 mg/dL   SED RATE (ESR)    Collection Time: 02/17/17  3:56 PM   Result Value Ref Range    Sed rate, automated 21 (H) 0 - 20 mm/hr   URINALYSIS W/MICROSCOPIC    Collection Time: 02/17/17  5:07 PM   Result Value Ref Range    Color YELLOW/STRAW      Appearance CLEAR CLEAR      Specific gravity 1.013 1.003 - 1.030      pH (UA) 7.0 5.0 - 8.0      Protein NEGATIVE  NEG mg/dL    Glucose NEGATIVE  NEG mg/dL    Ketone NEGATIVE  NEG mg/dL    Bilirubin NEGATIVE  NEG      Blood NEGATIVE  NEG      Urobilinogen 0.2 0.2 - 1.0 EU/dL    Nitrites NEGATIVE  NEG      Leukocyte Esterase NEGATIVE  NEG      WBC 0-4 0 - 4 /hpf    RBC 0-5 0 - 5 /hpf    Epithelial cells FEW FEW /lpf    Bacteria NEGATIVE  NEG /hpf     A/P: 66year old with dementia, HTN, prediabetes, neuropathy, presenting with morbilliform rash that seems more red today. Likely drug rash given newly started hydrocodone and previous similar reaction to morphine. This is supported by elevated eosinophils on CBC. No infectious sx, but viruses and mycoplasma can cause morbilliform reaction. Non-blanching nature of rash and elevated ESR and CRP point to possible vasculitic nature? Subacute cutaneous SLE sometimes thought to be morbilliform. Not looking like SJS or TENS at this point. In any case, likely offending agents will be held, will make comfortable w benadryl, topical steroids, monitor for progression of sx.     Ana Meza MD    Discussed w Dr. Chucky Stoddard

## 2017-02-18 NOTE — PROGRESS NOTES
Patient arrived to unit from ED. Received report from Nitesh Barreto [facility]. Primary Nurse Sally Damian.  Shawn Livingston RN and Nitesh Barreto RN performed a dual skin assessment on this patient Impairment noted- see wound doc flow sheet  Vikas score is 16

## 2017-02-18 NOTE — PROGRESS NOTES
Patient's wife approached RN voicing concern that patient is becoming agitated, has a headache, and is in pain in his legs, and he hasn't had his ativan, risperdal, and sertraline today. Call made to family practice, awaiting call back.

## 2017-02-18 NOTE — PROGRESS NOTES
2301  Received report from evening nurse and patient did not receive night dose of trazadone. According to the patient and wife patients takes 50 mgs at night. Family practice notified. Did not receive a call back. Will continue to monitor the patient.

## 2017-02-18 NOTE — PROGRESS NOTES
[facility] MEDICINE RESIDENCY PROGRAM  PROGRESS NOTE     2/18/2017  PCP: Jacob Nesbitt MD     Assessment/Plan:   Hospital Day: 2    Dinorah Duenas is a 66 y.o. male who is admitted for Generalized Rash.     Generalized Rash - Suspected etiology is drug induced (Exanthematous Drug Eruption), possibly Hydrocodone or Gabapentin (last dose on 02/14/17) since these medications were started about a week before the presentation of symptoms, and the patient have a Hx of morphine allergy with rash. Elevations in ABS Eosinophils 0.7 (NL 0.0 to 0.4), CRP 3.90, and ESR 21, and no evidence of an active infection, are more consistent with RUTHANN. Usual resolution of RUTHANN rash is within a week of last dose of offending medication. Other possible etiologies include vasculitis, viral or bacterial exanthemas, autoimmune disease, or other kind of allergic reaction.   - Hold hold home medications unless acutely needed  - Benadryl 50 mg PO Q6H  - Prednisone 60 mg PO qDay     Hypertension - Current BP: 111/51  - Continue Lisinopril and Atenolol if needed   - Hold HCTZ     Hyperlipidemia - Lipid Panel 12/02/16. Tchol: 149 LDL: 66 HDL: 60 Trigs: 113  - Resume Simvastatin if needed     Prediabetes - Last A1C 6.3 on 12/3/16. POA Glu 100  - Diet control     Alzheimer's Dementia  - Restart Donepezil     Anxiety and Agitation  - Continue Trazodone  - Resume Ativan, Zoloft, and Risperidone if needed     Chronic Pain due to Spinal Stenosis   - Resume home Tramadol if needed     Positional Vertigo  - Resume Antivert if needed     GERD  - Protonix     Feet Ulcers  - Skin Care Consult     FEN/GI - Diabetic Diet. Activity - Ambulate with assistance   DVT prophylaxis - Lovenox  GI prophylaxis - Protonix  Disposition - Plan to d/c to Home.      CODE STATUS:  FULL CODE    Pt to be discussed with Dr. Keya Clark (on-call attending physician)     Subjective:   Pt was seen and examined at bedside. Afebrile and hemodynamically stable.  Concerns overnight include: None. Denies chest pain, SOB, nausea, vomiting, abdominal pain, dizziness. Objective:   Physical examination  Visit Vitals    /53 (BP 1 Location: Right arm, BP Patient Position: At rest)    Pulse 71    Temp 98.4 °F (36.9 °C)    Resp 13    Ht 5' 4.5\" (1.638 m)    Wt 178 lb (80.7 kg)    SpO2 92%    BMI 30.08 kg/m2      Temp (24hrs), Av.2 °F (36.8 °C), Min:97.8 °F (36.6 °C), Max:98.4 °F (36.9 °C)       O2 Device: Room air    General: No acute distress. Alert. Cooperative. Head: Normocephalic. Atraumatic. Eyes:  Conjunctiva pink. Sclera white. PERRL. Ears:  Ear canals patent. TM non-erythematous. Nose:  Septum midline. Mucosa pink. No drainage. Throat: Mucosa pink. Moist mucous membranes. No tonsillar exudates or erythema. Palate movement equal bilaterally. Neck: Supple. Normal ROM. No stiffness. No lymphadenopathy    Respiratory: CTAB. No w/r/r/c.   Cardiovascular: RRR. Normal S1,S2. No m/r/g. Pulses 2+ throughout. GI: + bowel sounds. Nontender. No rebound tenderness or guarding. Nondistended. Extremities: No edema. No palpable cord. No tenderness. Musculoskeletal: Full ROM in all extremities. Neuro: CN II-XII grossly intact. Strength 5/5 in all extremities. Sensation intact in all extremities. DTRs 2+ throughout. Skin: Non blanching erythematous macules located in all skin, more more concentrated on the flexure areas (see below pictures). No involvement of the oral mucosa or conjunctiva. Bilateral feet ulcers.          Data Review:     Recent Labs      17   0403  17   1556   WBC  7.2  10.9   HGB  12.6  12.3   HCT  37.9  36.6   PLT  185  175     Recent Labs      17   0403  17   1556   NA  140  139   K  4.4  4.0   CL  104  103   CO2  26  31   GLU  176*  100   BUN  25*  20   CREA  1.17  1.01   CA  8.7  8.2*   ALB  2.9*  3.2*   TBILI  0.5  0.4   SGOT  22  23   ALT  23  28     Medications reviewed  Current Facility-Administered Medications Medication Dose Route Frequency    sodium chloride (NS) flush 5-10 mL  5-10 mL IntraVENous Q8H    sodium chloride (NS) flush 5-10 mL  5-10 mL IntraVENous PRN    enoxaparin (LOVENOX) injection 40 mg  40 mg SubCUTAneous Q24H    atenolol (TENORMIN) tablet 50 mg  50 mg Oral DAILY    lisinopril (PRINIVIL, ZESTRIL) tablet 5 mg  5 mg Oral DAILY    diphenhydrAMINE (BENADRYL) capsule 50 mg  50 mg Oral Q6H    pantoprazole (PROTONIX) tablet 40 mg  40 mg Oral ACB    predniSONE (DELTASONE) tablet 60 mg  60 mg Oral DAILY WITH BREAKFAST    triamcinolone acetonide (KENALOG) 0.1 % cream   Topical BID    traZODone (DESYREL) tablet 150 mg  150 mg Oral QHS         Signed:   Mera Rocha MD   Resident, [facility] ([place], [place]) Problems  Date Reviewed: 2/15/2017          Codes Class Noted POA    * (Principal)Rash and nonspecific skin eruption ICD-10-CM: R21  ICD-9-CM: 782.1  2/17/2017 Yes        Late onset Alzheimer's disease with behavioral disturbance (Chronic) ICD-10-CM: G30.1, F02.81  ICD-9-CM: 331.0, 294.11  3/1/2016 Yes        Positional vertigo (Chronic) ICD-10-CM: H81.10  ICD-9-CM: 386.11  3/29/2011 Yes        Pure hypercholesterolemia (Chronic) ICD-10-CM: E78.00  ICD-9-CM: 272.0 Chronic Unknown Yes        Essential hypertension, benign (Chronic) ICD-10-CM: I10  ICD-9-CM: 277. 1 Chronic 2/24/2010 Yes        Spinal stenosis, unspecified region other than cervical (Chronic) ICD-10-CM: M48.00  ICD-9-CM: 724.00 Chronic 2/24/2010 Yes    Overview Signed 12/28/2010 10:42 AM by MD Dr. David Blackburn. Legs will go numb after standing 3 minute.              Controlled type 2 diabetes mellitus with skin complication, without long-term current use of insulin (HCC) (Chronic) ICD-10-CM: E11.628  ICD-9-CM: 250.80 Chronic 2/24/2010 Yes

## 2017-02-18 NOTE — PROGRESS NOTES
Bedside and Verbal shift change report given to Chai RN (oncoming nurse) by Tara Alcocer RN (offgoing nurse). Report included the following information SBAR, Kardex, Intake/Output, MAR, Accordion and Recent Results.

## 2017-02-18 NOTE — ED NOTES
TRANSFER - OUT REPORT:    Verbal report given to Liset Keshawn on Dennise Krugerar  being transferred to [phone] for routine progression of care. Report consisted of patients Situation, Background, Assessment and   Recommendations(SBAR). Information from the following report(s) SBAR, ED Summary, Intake/Output, MAR, Recent Results and Cardiac Rhythm Sinus Candace Elyron was reviewed with the receiving nurse. Lines:   Peripheral IV 02/17/17 Left Antecubital (Active)   Site Assessment Clean, dry, & intact 2/17/2017  4:10 PM   Phlebitis Assessment 0 2/17/2017  4:10 PM   Infiltration Assessment 0 2/17/2017  4:10 PM   Dressing Status Clean, dry, & intact 2/17/2017  4:10 PM   Hub Color/Line Status Pink;Flushed 2/17/2017  4:10 PM   Action Taken Blood drawn 2/17/2017  4:10 PM        Opportunity for questions and clarification was provided.

## 2017-02-19 NOTE — DISCHARGE SUMMARY
[id number] PROGRAM   Discharge Note    Date: February 19, 2017    Zoe Sullivan  MRN: [id number]  YOB: 1945  Age: 66 y.o. Date of admission: 2/17/2017     Date of discharge/transfer: 2/19/2017    Primary Care Physician: Amarilis Olson MD     Attending physician at admission: Jaxon Nevarez DO     Attending physician at discharge/transfer: Jaxon Nevarez DO     Resident physician at discharge/transfer: Adriana Frias MD     Consultants during hospitalization  None    Admission diagnoses   Rash and nonspecific skin eruption    Discharge diagnoses  Hospital Problems  Date Reviewed: 2/15/2017          Codes Class Noted POA    * (Principal)Rash and nonspecific skin eruption ICD-10-CM: R21  ICD-9-CM: 782.1  2/17/2017 Yes        Late onset Alzheimer's disease with behavioral disturbance (Chronic) ICD-10-CM: G30.1, F02.81  ICD-9-CM: 331.0, 294.11  3/1/2016 Yes        Positional vertigo (Chronic) ICD-10-CM: H81.10  ICD-9-CM: 386.11  3/29/2011 Yes        Pure hypercholesterolemia (Chronic) ICD-10-CM: E78.00  ICD-9-CM: 272.0 Chronic Unknown Yes        Essential hypertension, benign (Chronic) ICD-10-CM: I10  ICD-9-CM: 965. 1 Chronic 2/24/2010 Yes        Spinal stenosis, unspecified region other than cervical (Chronic) ICD-10-CM: M48.00  ICD-9-CM: 724.00 Chronic 2/24/2010 Yes    Overview Signed 12/28/2010 10:42 AM by MD Dr. Mariam Torres. Legs will go numb after standing 3 minute.              Controlled type 2 diabetes mellitus with skin complication, without long-term current use of insulin (HCC) (Chronic) ICD-10-CM: E11.628  ICD-9-CM: 250.80 Chronic 2/24/2010 Yes               History of Present Illness  Per Dr. Jaleesa Gonzales:   Angela Bradley is a 66 y.o. male with Hx of DM, pure hypercholesterolemia, spinal stenosis, spinal stenosis, horseshoe kidney, GERD, HTN, anxiety, falls, fatigue, hearing loss, snoring, vertigo, DDD, Alzheimer's Disease, and neuropathy who presents from home via EMS with chief complaint of generalized red rash. As reported by the wife, the rash appeared on last Tuesday (02/14/17) about a week after starting on Hydrocodone and Gabapentin for neuropathy treatment. Both medications were suspended as recommended by PCP that same day. Today (02/17/17), the rash became more red and itching, and the patient was brought to the ED. Wife denies fever, dizziness, bleeding, lightheadedness, LOC, conjunctivitis, oral lesions, SOB, CP, edema, n/v, diarrhea, hematochezia/melena, hematuria, dysuria, arthralgias, or any other complains.     In the ER, vital signs were remarkable for HR 51. Labs were remarkable for ABS Eosinophils 0.7 (NL 0.0 to 0.4), CRP 3.90, and ESR 21. XR of chest and both feet showed no acute findings. Pt was treated with Solumedrol, Benadryl, and IVF. \"    Hospital course  Generalized Rash - Much improved with prednisone x2 days bendadryl, and kenalog for itching. Suspected etiology is drug induced (Exanthematous Drug Eruption), possibly Hydrocodone or Gabapentin (last dose on 02/14/17) since these medications were started about a week before the presentation of symptoms, and the patient have a Hx of morphine allergy with rash. Other possible etiologies include vasculitis, viral or bacterial exanthemas, autoimmune disease, or other kind of allergic reaction.   - Recommended patient not to take hydrocodone and gabapentin and follow-up with PCP for further medication changes. - Discharged with Prednisone 60 mg daily for 3 days and Kenalog creme for itching.       Hypertension - Current BP: 110/64  - Resume lisinopril, atenolol and HCTZ      Hyperlipidemia - Lipid Panel 12/02/16. Tchol: 149 LDL: 66 HDL: 60 Trigs: 113  - Resume Simvastatin       Diabetes, type II with peripheral neuropathy- Diagnosed with DM2 in 2010. Last A1C 6.3 on 12/3/16. POA Glu 100  -Continue with diet control   -Discontinued gabapentin. Started on Lyrica.  Can discuss with PCP regarding medication adjustments      Alzheimer's Dementia  - Resume Donepezil      Anxiety and Agitation  - Continue Trazodone, Ativan, Zoloft, and Risperidone if needed      Chronic Pain due to Spinal Stenosis   - Resume Tramadol       Positional Vertigo  - Resume Antivert       GERD  - Resume prilosec       Feet Ulcers due to DM type II: wound care consult placed while inpatient. - Home health will have to change dressing as appropriate    Physical exam at discharge:   General: No acute distress. Alert. Cooperative. Neck: Supple. Normal ROM. No stiffness. No lymphadenopathy    Respiratory: CTAB. No w/r/r/c.   Cardiovascular: RRR. Normal S1,S2. No m/r/g. Pulses 2+ throughout. GI: + bowel sounds. Nontender. No rebound tenderness or guarding. Nondistended. Extremities: No edema. No palpable cord. No tenderness. Musculoskeletal: Full ROM in all extremities. Neuro: CN II-XII grossly intact. Strength 5/5 in all extremities. Sensation intact in all extremities. DTRs 2+ throughout. Skin: Improving non blanching erythematous macules all over the body, more concentrated on the flexure areas (see below pictures). No involvement of the oral mucosa or conjunctiva. Bilateral feet ulcers. Condition at discharge: Stable. Labs  Recent Labs      02/18/17   0403  02/17/17   1556   WBC  7.2  10.9   HGB  12.6  12.3   HCT  37.9  36.6   PLT  185  175     Recent Labs      02/19/17   0537  02/18/17   0403  02/17/17   1556   NA  139  140  139   K  4.2  4.4  4.0   CL  106  104  103   CO2  26  26  31   BUN  31*  25*  20   CREA  0.97  1.17  1.01   GLU  108*  176*  100   CA  8.4*  8.7  8.2*     Recent Labs      02/19/17   0537  02/18/17   0403  02/17/17   1556   SGOT  30  22  23   ALT  23  23  28   AP  67  91  105   TBILI  0.5  0.5  0.4   TP  5.9*  6.4  6.6   ALB  2.6*  2.9*  3.2*   GLOB  3.3  3.5  3.4     No results for input(s): INR, PTP, APTT in the last 72 hours.     No lab exists for component: INREXT   No results for input(s): FE, TIBC, PSAT, FERR in the last 72 hours. No results for input(s): PH, PCO2, PO2 in the last 72 hours. Recent Labs      02/17/17   1556   CPK  166   CKMB  4.2*     No results found for: GLUCPOC         Diagnostic studies  None  Procedures  None    Disposition:  Home with home health     Discharge/Transfer Medications  Current Discharge Medication List      START taking these medications    Details   predniSONE (DELTASONE) 20 mg tablet Take 3 Tabs by mouth daily. Qty: 3 Tab, Refills: 0      triamcinolone acetonide (KENALOG) 0.1 % topical cream Apply 28.4 g to affected area two (2) times a day. use thin layer  Qty: 15 g, Refills: 0         CONTINUE these medications which have NOT CHANGED    Details   LORazepam (ATIVAN) 0.5 mg tablet Take 0.5 mg by mouth every evening. !! risperiDONE (RISPERDAL) 1 mg tablet Take 1 mg by mouth daily. !! risperiDONE (RISPERDAL) 1 mg tablet Take 2 mg by mouth every evening. omeprazole (PRILOSEC) 40 mg capsule TAKE ONE CAPSULE BY MOUTH ONCE DAILY FOR  ACID  REFLUX  Qty: 90 Cap, Refills: 3      traMADol (ULTRAM) 50 mg tablet TAKE ONE TABLET BY MOUTH EVERY 8 HOURS AS NEEDED FOR PAIN MAX  DAILY  AMOUNT  150MG  Qty: 90 Tab, Refills: 0    Associated Diagnoses: Spinal stenosis, unspecified region other than cervical      lisinopril (PRINIVIL, ZESTRIL) 5 mg tablet Take 1 Tab by mouth daily. Qty: 30 Tab, Refills: 5      meclizine (ANTIVERT) 12.5 mg tablet Take 1 Tab by mouth three (3) times daily as needed. Qty: 90 Tab, Refills: 4      traZODone (DESYREL) 50 mg tablet TAKE 1 TABLET BY MOUTH NIGHTLY FOR SLEEP  Qty: 30 Tab, Refills: 5      sertraline (ZOLOFT) 100 mg tablet Take 1.5 Tabs by mouth daily. Qty: 45 Tab, Refills: 11    Associated Diagnoses: Anxiety      simvastatin (ZOCOR) 40 mg tablet TAKE 1 TABLET BY MOUTH NIGHTLY  Qty: 90 Tab, Refills: 3      hydrochlorothiazide (HYDRODIURIL) 25 mg tablet Take 1 Tab by mouth daily.   Qty: 90 Tab, Refills: 3 atenolol (TENORMIN) 50 mg tablet Take 1 Tab by mouth daily. Qty: 90 Tab, Refills: 3      donepezil (ARICEPT) 10 mg tablet Take 1 Tab by mouth daily. Qty: 90 Tab, Refills: 3      latanoprost (XALATAN) 0.005 % ophthalmic solution Administer 1 Drop to right eye nightly. aspirin delayed-release 81 mg tablet Take 1 Tab by mouth daily. Associated Diagnoses: Type II or unspecified type diabetes mellitus without mention of complication, not stated as uncontrolled      naproxen sodium (ALEVE) 220 mg tablet Take 220 mg by mouth two (2) times daily (with meals). multivitamins-minerals-lutein (CENTRUM SILVER) Tab Take 1 Tab by mouth daily. cholecalciferol, vitamin d3, (VITAMIN D) 1,000 unit tablet Take 1,000 Units by mouth daily. Indications: VITAMIN D DEFICIENCY       !! - Potential duplicate medications found. Please discuss with provider.           Activity:  As tolerated     Discharge instructions to patient/family  Please seek medical attention for any new or worsening symptoms particularly fever, chest pain, shortness of breath, abdominal pain, nausea, vomiting    Follow up plans/appointments  Follow-up Information     Follow up With Details Comments [address] Neoladarci Chaves MD Schedule an appointment as soon as possible for a visit in 3 days hospital follow-up Zoe [address]               Khalif Chandler MD  Family Medicine Resident    Cc: Libia Pruitt MD

## 2017-02-19 NOTE — PROGRESS NOTES
2/19/2017 2:42 PM MICHELLE spoke with Cindy Desai , the on call [facility] with [facility]. MICHELLE informed her that this pt was being discharged today. AVS sent via [facility]. ANNE Castellanos    2/19/2017 12:04 PM MICHELLE spoke with [facility]. Plan is for this pt to be discharged today. They have ordered home health. SW has spoken with pt's wife. They are already receiving services from [facility]. Resumption of home care referral has been sent to [facility] via [facility].    ANNE Castellanos

## 2017-02-19 NOTE — PROGRESS NOTES
Discharge instructions and prescriptions provided and reviewed with patient and patient's wife. Opportunity for questions provided and all questions answered appropriately.

## 2017-02-19 NOTE — DISCHARGE INSTRUCTIONS
HOME DISCHARGE INSTRUCTIONS    Subha Steve / [id number] : 1945    Admission date: 2017 Discharge date: 2017     Please bring this form with you to show your care provider at your follow-up appointment. Primary care provider:  Kimberlee Kauffman MD    Discharging provider:  Arun Duke MD  - Family Medicine Resident  Doris Gabriel DO- Attending, Family Medicine     You have been admitted to the hospital with the following diagnoses:    ACUTE DIAGNOSES:  Rash and nonspecific skin eruption  . . . . . . . . . . . . . . . . . . . . . . . . . . . . . . . . . . . . . . . . . . . . . . . . . . . . . . . . . . . . . . . . . . . . . . . .   Medications:  -START Prednisone 60mg one tab daily for 3 more days, use the kenalog cream no more than twice daily for itching as needed, lyrica 50mg three times daily for neuropathy  -STOP taking Hydrocodone and  Gabapentin     FOLLOW-UP CARE RECOMMENDATIONS:    Appointments  Follow-up Information     Follow up With Details Comments Contact Info    Kimberlee Kauffman MD Schedule an appointment as soon as possible for a visit in 3 days hospital follow-up Aminta Hansen [address] [phone]             Follow-up tests needed: None    Pending test results: At the time of your discharge the following test results are still pending: None  Please make sure you review these results with your outpatient follow-up provider(s). Specific symptoms to watch for: chest pain, shortness of breath, fever, chills, nausea, vomiting, diarrhea, change in mentation, falling, weakness, bleeding. DIET/what to eat:  Regular Diet    What to do if new or unexpected symptoms occur? If you experience any of the above symptoms (or should other concerns or questions arise after discharge) please call your primary care physician. Return to the emergency room if you cannot get hold of your doctor.     · It is very important that you keep your follow-up appointment(s). · Please bring discharge papers, medication list (and/or medication bottles) to your follow-up appointments for review by your outpatient provider(s). · Please check the list of medications and be sure it includes every medication (even non-prescription medications) that your provider wants you to take. · It is important that you take the medication exactly as they are prescribed. · Keep your medication in the bottles provided by the pharmacist and keep a list of the medication names, dosages, and times to be taken in your wallet. · Do not take other medications without consulting your doctor. · If you have any questions about your medications or other instructions, please talk to your nurse or care provider before you leave the hospital.     Information obtained by:     I understand that if any problems occur once I am at home I am to contact my physician. These instructions were explained to me and I had the opportunity to ask questions. I understand and acknowledge receipt of the instructions indicated above.                                                                                                                                                Physician's or R.N.'s Signature                                                                  Date/Time                                                                                                                                              Patient or Representative Signature                                                          Date/Time

## 2017-02-19 NOTE — PROGRESS NOTES
Bedside and Verbal shift change report given to Pilar Cheema RN (oncoming nurse) by ISAÍAS Francois (offgoing nurse). Report included the following information SBAR, Kardex, ED Summary, Procedure Summary, Recent Results and Med Rec Status.

## 2017-02-19 NOTE — PROGRESS NOTES
Bedside shift change report given to Scott Coon (oncoming nurse) by Elizabeth Coelho RN (offgoing nurse). Report included the following information SBAR, Kardex, Intake/Output, MAR, Recent Results and Med Rec Status.

## 2017-02-21 NOTE — PROGRESS NOTES
[facility] 2/17-2/19/2017 Rash and skin eruption    [id number] [address] [place] Follow Up for Hospital Admission from 2/17 - 2/19/2017 for rash and skin eruption. RRAT score: 27 High     Please note functional assessment. NN spoke with patients wife, Koki Craig, who is listed on HIPPA. Wife states \"He's better. They had to change some of his medicines\". Wife denies patient is having fever, chills, nausea, vomiting, chest pain, sob or inability to take medications. NN discussed red flags with wife and wife verbalized understanding of when to seek immediate medical attention. Red flags/sepsis: fever or below normal temperature (97f), chills, nausea, vomiting, diarrhea, chest pain, shortness of breath, unable to take medications, unusual sensations, and BFAST. Patient has [facility] appointment 2/24/2017. Advance Medical Directive on file in EMR? yes has an advanced directive - a copy has been provided    Medical History:     Past Medical History   Diagnosis Date    Anxiety     DDD (degenerative disc disease), cervical     DDD (degenerative disc disease), lumbar     Dementia     Diabetes ([address]     Falls     Fatigue     GERD (gastroesophageal reflux disease)     Glaucoma     Glaucoma      right eye    Hearing loss     Horseshoe kidney     Hypertension     Irritable bowel syndrome     Neuropathy     Osteoarthrosis, unspecified whether generalized or localized, other specified sites     Pressure sore on heel     Pure hypercholesterolemia     Ringing in ears     Severe single current episode of major depressive disorder, with psychotic features ([address] 6/22/2016    Snoring     Spinal stenosis, unspecified region other than cervical     Vertigo      This represents Transitions of Care b/c NN spoke with patient and/or caregiver within 2 business days of discharge. Pt's TCM follow up appt is scheduled with Dr. Omer Tyler on 2/24/2017, which is within 5 days of discharge.      Called patient on 2/21/2017 and verified patient with 2 identifiers. Fall Risk Assessment:    Fall Risk Assessment, last 12 mths 6/22/2016   Able to walk? Yes   Fall in past 12 months? Yes   Fall with injury? Yes   Number of falls in past 12 months 3   Fall Risk Score 4     Patient presenting symptoms (referenced by Marc Mariscal DO): Per Dr. Brand Deal:   Raad Turner is a 66 y.o. male with Hx of DM, pure hypercholesterolemia, spinal stenosis, spinal stenosis, horseshoe kidney, GERD, HTN, anxiety, falls, fatigue, hearing loss, snoring, vertigo, DDD, Alzheimer's Disease, and neuropathy who presents from home via EMS with chief complaint of generalized red rash. As reported by the wife, the rash appeared on last Tuesday (02/14/17) about a week after starting on Hydrocodone and Gabapentin for neuropathy treatment. Both medications were suspended as recommended by PCP that same day. Today (02/17/17), the rash became more red and itching, and the patient was brought to the ED. Wife denies fever, dizziness, bleeding, lightheadedness, LOC, conjunctivitis, oral lesions, SOB, CP, edema, n/v, diarrhea, hematochezia/melena, hematuria, dysuria, arthralgias, or any other complains. Course of current Hospitalization (referenced by Marc Mariscal DO note dated 2/19/2017):   Generalized Rash - Much improved with prednisone x2 days bendadryl, and kenalog for itching. Suspected etiology is drug induced (Exanthematous Drug Eruption), possibly Hydrocodone or Gabapentin (last dose on 02/14/17) since these medications were started about a week before the presentation of symptoms, and the patient have a Hx of morphine allergy with rash. Other possible etiologies include vasculitis, viral or bacterial exanthemas, autoimmune disease, or other kind of allergic reaction.   - Recommended patient not to take hydrocodone and gabapentin and follow-up with PCP for further medication changes.   - Discharged with Prednisone 60 mg daily for 3 days and Kenalog creme for itching.       Hypertension - Current BP: 110/64  - Resume lisinopril, atenolol and HCTZ      Hyperlipidemia - Lipid Panel 12/02/16. Tchol: 149 LDL: 66 HDL: 60 Trigs: 113  - Resume Simvastatin       Prediabetes with peripheral neuropathy- Last A1C 6.3 on 12/3/16. POA Glu 100  -Continue with diet control   -Discontinued gabapentin. Started on Lyrica. Can discuss with PCP regarding medication adjustments      Alzheimer's Dementia  - Resume Donepezil      Anxiety and Agitation  - Continue Trazodone, Ativan, Zoloft, and Risperidone if needed      Chronic Pain due to Spinal Stenosis   - Resume Tramadol       Positional Vertigo  - Resume Antivert       GERD  - Resume prilosec       Feet Ulcers: wound care consult placed while inpatient. - Home health will have to change dressing as appropriate    Lab/Diagnostics at time of Discharge:   Results for Colleen Malhotra (MRN [id number]) as of 2/21/2017 13:50   Ref. Range 2/19/2017 05:37   Sodium Latest Ref Range: 136 - 145 mmol/L 139   Potassium Latest Ref Range: 3.5 - 5.1 mmol/L 4.2   Chloride Latest Ref Range: 97 - 108 mmol/L 106   CO2 Latest Ref Range: 21 - 32 mmol/L 26   Anion gap Latest Ref Range: 5 - 15 mmol/L 7   Glucose Latest Ref Range: 65 - 100 mg/dL 108 (H)   BUN Latest Ref Range: 6 - 20 MG/DL 31 (H)   Creatinine Latest Ref Range: 0.70 - 1.30 MG/DL 0.97   BUN/Creatinine ratio Latest Ref Range: 12 - 20   32 (H)   Calcium Latest Ref Range: 8.5 - 10.1 MG/DL 8.4 (L)   GFR est non-AA Latest Ref Range: >60 ml/min/1.73m2 >60   GFR est AA Latest Ref Range: >60 ml/min/1.73m2 >60   Bilirubin, total Latest Ref Range: 0.2 - 1.0 MG/DL 0.5   Protein, total Latest Ref Range: 6.4 - 8.2 g/dL 5.9 (L)   Albumin Latest Ref Range: 3.5 - 5.0 g/dL 2.6 (L)   Globulin Latest Ref Range: 2.0 - 4.0 g/dL 3.3   A-G Ratio Latest Ref Range: 1.1 - 2.2   0.8 (L)   ALT Latest Ref Range: 12 - 78 U/L 23   AST Latest Ref Range: 15 - 37 U/L 30   Alk.  phosphatase Latest Ref Range: 45 - 117 U/L 67       Medication Reconciliation completed: yes     Support System consists of: wife    [address], if so what agency? Yes [facility] [place] remains available to patient and family.

## 2017-02-21 NOTE — PROGRESS NOTES
[facility] 1/28/2017 Acute diabetic foot ulcer of right foot    This episode closed. Patient had hospital admission. Please see new episode.

## 2017-02-24 NOTE — MR AVS SNAPSHOT
Visit Information Date & Time Provider Department Dept. Phone Encounter #  
 2/24/2017 11:30 AM Pieter Tinsley  [facility] [phone] [id number] Follow-up Instructions Return in about 4 weeks (around 3/24/2017), or if symptoms worsen or fail to improve. Your Appointments 3/1/2017 10:50 AM  
SAME DAY SICK with Bruce Roberts MD  
[address] [place] [address]) Appt Note: F/U rash from medication [address] [facility] [address] [address]  
[phone]  
  
   
 [facility] [address] [address]  
  
    
 4/4/2017 10:30 AM  
ROUTINE CARE with Bruce Roberts MD  
[address] [place] [address]) Appt Note: 3 mo Diabetes [address] [facility] [address]   
[phone]  
  
   
 [address] [place] [address] [address]  
  
    
 6/13/2017 11:00 AM  
Follow Up with Genevive Eisenmenger, MD  
Neurology [address] [place] [address],  [id number] [facility]) Appt Note: memory loss atilio  
 [address] [facility] [address] [address]  
[phone]  
  
   
 [address] A [address] [address] Upcoming Health Maintenance Date Due  
 MEDICARE YEARLY EXAM 1/7/2017 HEMOGLOBIN A1C Q6M 6/2/2017 FOOT EXAM Q1 6/22/2017 EYE EXAM RETINAL OR DILATED Q1 8/18/2017 MICROALBUMIN Q1 12/2/2017 LIPID PANEL Q1 12/2/2017 GLAUCOMA SCREENING Q2Y 8/18/2018 COLONOSCOPY 1/21/2020 DTaP/Tdap/Td series (2 - Td) 11/7/2021 Allergies as of 2/24/2017  Review Complete On: 2/24/2017 By: Mike Castillo Severity Noted Reaction Type Reactions Hydrocodone-acetaminophen High 02/19/2017   Systemic Rash Morbiliform exanthem rash Morphine High 08/18/2015   Topical Rash Erythromycin Medium 02/24/2010   Intolerance Nausea and Vomiting  
 Sulfa (Sulfonamide Antibiotics) Medium 02/24/2010   Not Verified Rash Adhesive Tape-silicones  38/06/4565    Hives Cinnamon  05/28/2014   Systemic Nausea Only Codeine  02/24/2010   Side Effect Other (comments)  
 hallucinations Gabapentin  02/21/2017    Rash Shellfish Derived  02/17/2017    Nausea and Vomiting Current Immunizations  Reviewed on 12/2/2016 Name Date Influenza Vaccine 9/15/2016, 9/17/2015, 9/18/2014, 10/17/2013 Influenza Vaccine Split 10/11/2012, 10/5/2009 Influenza Vaccine Whole 9/21/2011, 9/28/2010 Pneumococcal Conjugate (PCV-13) 3/15/2016 Pneumococcal Polysaccharide (PPSV-23) 12/13/2012 Pneumococcal Vaccine (Pcv) 11/24/2008 TDAP Vaccine 11/7/2011 Zoster Vaccine, Live 9/18/2014 Not reviewed this visit You Were Diagnosed With   
  
 Codes Comments Controlled type 2 diabetes mellitus with foot ulcer, without long-term current use of insulin ([facility]    -  Primary ICD-10-CM: E11.621, Z88.885 ICD-9-CM: 250.80, 707.15 Heel ulcer, right, limited to breakdown of skin ([facility]     ICD-10-CM: L97.411 ICD-9-CM: 707.14 Heel ulcer, left, with fat layer exposed ([facility]     ICD-10-CM: B94.041 ICD-9-CM: 707.14 Dementia without behavioral disturbance, unspecified dementia type     ICD-10-CM: F03.90 ICD-9-CM: 294.20 Poor circulation of extremity (HCC)     ICD-10-CM: I73.9 ICD-9-CM: 443.9 Spinal stenosis, unspecified region other than cervical     ICD-10-CM: M48.00 ICD-9-CM: 724.00 Vitals BP  
  
  
  
  
  
 113/70 Vitals History BMI and BSA Data Body Mass Index Body Surface Area  
 29.41 kg/m 2 1.89 m 2 Preferred Pharmacy Pharmacy Name Phone [facility] [address] [place] [address] [phone] Your Updated Medication List  
  
   
This list is accurate as of: 2/24/17 12:12 PM.  Always use your most recent med list.  
  
  
  
  
 ALEVE 220 mg tablet Generic drug:  naproxen sodium Take 220 mg by mouth two (2) times daily (with meals). aspirin delayed-release 81 mg tablet Take 1 Tab by mouth daily. atenolol 50 mg tablet Commonly known as:  TENORMIN Take 1 Tab by mouth daily. CENTRUM SILVER Tab tablet Generic drug:  multivitamins-minerals-lutein Take 1 Tab by mouth daily. donepezil 10 mg tablet Commonly known as:  ARICEPT Take 1 Tab by mouth daily. hydroCHLOROthiazide 25 mg tablet Commonly known as:  HYDRODIURIL Take 1 Tab by mouth daily. latanoprost 0.005 % ophthalmic solution Commonly known as:  Akshat Kaur Administer 1 Drop to right eye nightly. lisinopril 5 mg tablet Commonly known as:  Vinicius Infanteing Take 1 Tab by mouth daily. LORazepam 0.5 mg tablet Commonly known as:  ATIVAN Take 0.5 mg by mouth every evening. meclizine 12.5 mg tablet Commonly known as:  ANTIVERT Take 1 Tab by mouth three (3) times daily as needed. omeprazole 40 mg capsule Commonly known as:  PRILOSEC  
TAKE ONE CAPSULE BY MOUTH ONCE DAILY FOR  ACID  REFLUX  
  
 predniSONE 20 mg tablet Commonly known as:  Marcelene Im Take 3 Tabs by mouth daily. pregabalin 150 mg capsule Commonly known as:  Geetha Craw Take 1 Cap by mouth three (3) times daily for 360 days. Max Daily Amount: 450 mg.  
  
 * RisperDAL 1 mg tablet Generic drug:  risperiDONE Take 1 mg by mouth daily. * RisperDAL 1 mg tablet Generic drug:  risperiDONE Take 2 mg by mouth every evening. sertraline 100 mg tablet Commonly known as:  ZOLOFT Take 1.5 Tabs by mouth daily. simvastatin 40 mg tablet Commonly known as:  ZOCOR  
TAKE 1 TABLET BY MOUTH NIGHTLY  
  
 traMADol 50 mg tablet Commonly known as:  ULTRAM  
Take 1 Tab by mouth every six (6) hours as needed for Pain for up to 30 days. Max Daily Amount: 200 mg. Indications: Pain  
  
 traZODone 50 mg tablet Commonly known as:  DESYREL  
TAKE 1 TABLET BY MOUTH NIGHTLY FOR SLEEP  
 triamcinolone acetonide 0.1 % topical cream  
Commonly known as:  KENALOG Apply 28.4 g to affected area two (2) times a day. use thin layer VITAMIN D3 1,000 unit tablet Generic drug:  cholecalciferol Take 1,000 Units by mouth daily. Indications: VITAMIN D DEFICIENCY * Notice: This list has 2 medication(s) that are the same as other medications prescribed for you. Read the directions carefully, and ask your doctor or other care provider to review them with you. Prescriptions Printed Refills  
 traMADol (ULTRAM) 50 mg tablet 0 Sig: Take 1 Tab by mouth every six (6) hours as needed for Pain for up to 30 days. Max Daily Amount: 200 mg. Indications: Pain Class: Print Route: Oral  
 pregabalin (LYRICA) 150 mg capsule 3 Sig: Take 1 Cap by mouth three (3) times daily for 360 days. Max Daily Amount: 450 mg.  
 Class: Print Route: Oral  
  
Follow-up Instructions Return in about 4 weeks (around 3/24/2017), or if symptoms worsen or fail to improve. Please provide this summary of care documentation to your next provider. Your primary care clinician is listed as Π[address] 90. If you have any questions after today's visit, please call [phone].

## 2017-02-24 NOTE — PROGRESS NOTES
Reviewed record in preparation for visit and have necessary documentation      . Body mass index is 29.41 kg/(m^2).     Health Maintenance Due   Topic Date Due    MEDICARE YEARLY EXAM  01/07/2017

## 2017-02-24 NOTE — TELEPHONE ENCOUNTER
Application, medication list, allergy list, prescription for lyrica, copy of drivers license, medicare part D card, & tax return forms faxed to [employer].

## 2017-03-03 NOTE — TELEPHONE ENCOUNTER
[facility], wife called. The patient has been experiencing issues with low BP. He takes Atenolol, Lisinopril, and HCTZ. Below are the readings she gave. 1/23/17 89/49  2/3/17  93/51  2/13/17 102/52  2/14/17 108/59  2/24/17 94/61  2/25/17 108/62   2/26/17 118/64  2/27/17 89/52  3/1/17  83/47 with a pulse of 51  3/2/17  85/63 with a pulse of 63  3/3/17  80/44 with a pulse of 44    She gives him all of his BP medication in the mornings. She also states that he was started on Lyrica on 2/18/17 while in the hospital and wonders if this could have an effect on his BP. Please call to advise today. She is concerned and doesn't want his BP to go any lower. Please advise at [phone].

## 2017-03-03 NOTE — TELEPHONE ENCOUNTER
Message given to Practice Administrator as no referral from this office was made and no release of records  on file.

## 2017-03-03 NOTE — TELEPHONE ENCOUNTER
Phone call to patients wife. Informed her per Dr. Sergio Powell: Have them stop HCTZ and cut atenolol in half. Send in BP's in 1 week. Patients wife verbalized understanding.

## 2017-03-07 NOTE — TELEPHONE ENCOUNTER
Jerry Grant from [facility] called and said he needs to be seen tomorrow for Cellulitis in his left foot, however there isn't an appointment available.  Please give her a call back in regards to this at [phone]

## 2017-03-09 NOTE — PROGRESS NOTES
Reviewed record in preparation for visit and have necessary documentation  Pt did bring medication to office visit for review  opportunity was given for questions  Goals that were addressed and/or need to be completed during or after this appointment include    Health Maintenance Due   Topic Date Due    MEDICARE YEARLY EXAM  01/07/2017

## 2017-03-09 NOTE — MR AVS SNAPSHOT
Visit Information Date & Time Provider Department Dept. Phone Encounter #  
 3/9/2017 10:10 AM Kira Mccarty [facility] [phone] [id number] Follow-up Instructions Return in about 2 weeks (around 3/23/2017). Your Appointments 4/4/2017 10:30 AM  
ROUTINE CARE with Izola Rubinstein, MD  
[address] [facility]) Appt Note: 3 mo Diabetes [address] [facility] [address]   
[phone]  
  
   
 [address] [facility] [address] [address]  
  
    
 6/13/2017 11:00 AM  
Follow Up with Jared Pritchett MD  
Neurology [address] [facility], [id number] [facility]) Appt Note: memory loss atilio  
 [address] [facility] [address] [address]  
[phone]  
  
   
 [address] [facility] [address] [address] Upcoming Health Maintenance Date Due  
 MEDICARE YEARLY EXAM 1/7/2017 HEMOGLOBIN A1C Q6M 6/2/2017 FOOT EXAM Q1 6/22/2017 EYE EXAM RETINAL OR DILATED Q1 8/18/2017 MICROALBUMIN Q1 12/2/2017 LIPID PANEL Q1 12/2/2017 GLAUCOMA SCREENING Q2Y 8/18/2018 COLONOSCOPY 1/21/2020 DTaP/Tdap/Td series (2 - Td) 11/7/2021 Allergies as of 3/9/2017  Review Complete On: 3/9/2017 By: Stephanie Antony DO Severity Noted Reaction Type Reactions Hydrocodone-acetaminophen High 02/19/2017   Systemic Rash Morbiliform exanthem rash Morphine High 08/18/2015   Topical Rash Erythromycin Medium 02/24/2010   Intolerance Nausea and Vomiting  
 Sulfa (Sulfonamide Antibiotics) Medium 02/24/2010   Not Verified Rash Adhesive Tape-silicones  66/28/7034    Hives Cinnamon  05/28/2014   Systemic Nausea Only Codeine  02/24/2010   Side Effect Other (comments)  
 hallucinations Gabapentin  02/21/2017    Rash Shellfish Derived  02/17/2017    Nausea and Vomiting Current Immunizations  Reviewed on 12/2/2016 Name Date Influenza Vaccine 9/15/2016, 9/17/2015, 9/18/2014, 10/17/2013 Influenza Vaccine Split 10/11/2012, 10/5/2009 Influenza Vaccine Whole 9/21/2011, 9/28/2010 Pneumococcal Conjugate (PCV-13) 3/15/2016 Pneumococcal Polysaccharide (PPSV-23) 12/13/2012 Pneumococcal Vaccine (Pcv) 11/24/2008 TDAP Vaccine 11/7/2011 Zoster Vaccine, Live 9/18/2014 Not reviewed this visit You Were Diagnosed With   
  
 Codes Comments Cellulitis of left lower extremity    -  Primary ICD-10-CM: L03.116 ICD-9-CM: 090. 6 Type 2 diabetes mellitus with complication, without long-term current use of insulin (HCC)     ICD-10-CM: E11.8 ICD-9-CM: 250.90 Vitals BP Pulse Temp Resp Height(growth percentile) Weight(growth percentile) 103/61 (BP 1 Location: Left arm, BP Patient Position: Sitting) 64 99.3 °F (37.4 °C) (Oral) 16 5' 4.5\" (1.638 m) 180 lb (81.6 kg) SpO2 BMI Smoking Status 98% 30.42 kg/m2 Never Smoker BMI and BSA Data Body Mass Index Body Surface Area  
 30.42 kg/m 2 1.93 m 2 Preferred Pharmacy Pharmacy Name Phone [facility] [address] [place] [address] [phone] Your Updated Medication List  
  
   
This list is accurate as of: 3/9/17 11:27 AM.  Always use your most recent med list.  
  
  
  
  
 ALEVE 220 mg tablet Generic drug:  naproxen sodium Take 220 mg by mouth two (2) times daily (with meals). aspirin delayed-release 81 mg tablet Take 1 Tab by mouth daily. atenolol 50 mg tablet Commonly known as:  TENORMIN Take 1 Tab by mouth daily. CLARITIN 10 mg tablet Generic drug:  loratadine Take 10 mg by mouth daily. dicloxacillin 250 mg capsule Commonly known as:  Brenna Beatrice Take 250 mg by mouth Before breakfast, lunch, dinner and at bedtime. donepezil 10 mg tablet Commonly known as:  ARICEPT Take 1 Tab by mouth daily. doxycycline 100 mg tablet Commonly known as:  ADOXA Take 1 Tab by mouth two (2) times a day for 10 days. latanoprost 0.005 % ophthalmic solution Commonly known as:  Matthew Pry Administer 1 Drop to right eye nightly. lisinopril 5 mg tablet Commonly known as:  Wang Chika Take 1 Tab by mouth daily. LORazepam 0.5 mg tablet Commonly known as:  ATIVAN Take 0.5 mg by mouth every evening. meclizine 12.5 mg tablet Commonly known as:  ANTIVERT Take 1 Tab by mouth three (3) times daily as needed. omeprazole 40 mg capsule Commonly known as:  PRILOSEC  
TAKE ONE CAPSULE BY MOUTH ONCE DAILY FOR  ACID  REFLUX  
  
 pregabalin 150 mg capsule Commonly known as:  Edman Speaker Take 1 Cap by mouth three (3) times daily for 360 days. Max Daily Amount: 450 mg.  
  
 * RisperDAL 1 mg tablet Generic drug:  risperiDONE Take 1 mg by mouth daily. * RisperDAL 1 mg tablet Generic drug:  risperiDONE Take 2 mg by mouth every evening. sertraline 100 mg tablet Commonly known as:  ZOLOFT Take 1.5 Tabs by mouth daily. simvastatin 40 mg tablet Commonly known as:  ZOCOR  
TAKE 1 TABLET BY MOUTH NIGHTLY  
  
 traMADol 50 mg tablet Commonly known as:  ULTRAM  
Take 1 Tab by mouth every six (6) hours as needed for Pain for up to 30 days. Max Daily Amount: 200 mg. Indications: Pain  
  
 traZODone 50 mg tablet Commonly known as:  DESYREL  
TAKE 1 TABLET BY MOUTH NIGHTLY FOR SLEEP  
  
 triamcinolone acetonide 0.1 % topical cream  
Commonly known as:  KENALOG Apply 28.4 g to affected area two (2) times a day. use thin layer VITAMIN D3 1,000 unit tablet Generic drug:  cholecalciferol Take 1,000 Units by mouth daily. Indications: VITAMIN D DEFICIENCY * Notice: This list has 2 medication(s) that are the same as other medications prescribed for you. Read the directions carefully, and ask your doctor or other care provider to review them with you. Prescriptions Sent to Pharmacy Refills  
 doxycycline (ADOXA) 100 mg tablet 0 Sig: Take 1 Tab by mouth two (2) times a day for 10 days. Class: Normal  
 Pharmacy: [address] #: [phone] Route: Oral  
  
Follow-up Instructions Return in about 2 weeks (around 3/23/2017). Please provide this summary of care documentation to your next provider. Your primary care clinician is listed as Π[address] 90. If you have any questions after today's visit, please call [phone].

## 2017-03-09 NOTE — PROGRESS NOTES
Piyush Tinoco is an 66 y.o. male who presents with chief concern of possible cellulitis. Possible Cellulitis:  Since Sunday he has noticed redness of left foot with slow progression proximally. He has diabetic calcaneal ulcers since January and lower extremity swelling. He has been seen by both gen surgery, vascular and has had normal dopplers. He was told his pain is likely related to neuropathy. The gen surgeon, Dr. Marcos Yang, does not recommend debridement and is being treated with dry dressings. She saw him yesterday and noted to have increased redness which brings him here. His neuropathy has been better on the Lyrica. It is unclear of the systemic rash being related to narcotic or gabapentin and he is having good benefit on lyrica. When the initial visit for ulcer, he was started on dicloxacillin for 10 days through tomorrow. Despite it he has had increased redness. He admits had low grade \"99\"+ temperatures. No drainage. Both feet have chronic insufficiency. Right foot is blue and cold at baseline. Left foot is one having increasing redness. Detailed ROS below. Review of Systems, positives are bolded, strike through if denied. GEN:    FeverCV:      Pulm:    Abd/GI:   Psych:    Neuro:     ENT:      Endocrine:     :      MSK:      Skin:    Erythema and warmth to LLE, Lower Ext:           Current and past medical information:  I personally reviewed and included updated list below.     Past Medical History:   Diagnosis Date    Anxiety     DDD (degenerative disc disease), cervical     DDD (degenerative disc disease), lumbar     Dementia     Diabetes ([facility]     Falls     Fatigue     GERD (gastroesophageal reflux disease)     Glaucoma     Glaucoma     right eye    Hearing loss     Horseshoe kidney     Hypertension     Irritable bowel syndrome     Neuropathy     Osteoarthrosis, unspecified whether generalized or localized, other specified sites     Pressure sore on heel     Pure hypercholesterolemia     Ringing in ears     Severe single current episode of major depressive disorder, with psychotic features ([facility] 6/22/2016    Snoring     Spinal stenosis, unspecified region other than cervical     Vertigo        Allergies   Allergen Reactions    Hydrocodone-Acetaminophen Rash     Morbiliform exanthem rash      Morphine Rash    Erythromycin Nausea and Vomiting    Sulfa (Sulfonamide Antibiotics) Rash    Adhesive Tape-Silicones Hives    Cinnamon Nausea Only    Codeine Other (comments)     hallucinations    Gabapentin Rash    Shellfish Derived Nausea and Vomiting       Past Surgical History:   Procedure Laterality Date    AMB POC EEG / SLEEP DEPRIVED      APPENDECTOMY      HX CHOLECYSTECTOMY  3/10    HX HEENT      HX HERNIA REPAIR  left inguinal    HX ORTHOPAEDIC      lumbar decompression    HX SPLENECTOMY  1963    HX SPLENECTOMY      LAMINECTOMY,LUMBAR  1992       Social History     Social History    Marital status:      Spouse name: N/A    Number of children: N/A    Years of education: N/A     Social History Main Topics    Smoking status: Never Smoker    Smokeless tobacco: Never Used    Alcohol use No    Drug use: No    Sexual activity: Yes     Partners: Female     Birth control/ protection: None     Other Topics Concern    None     Social History Narrative           Physical Exam, Abnormal/pertinent findings bolded,     Visit Vitals    /61 (BP 1 Location: Left arm, BP Patient Position: Sitting)    Pulse 64    Temp 99.3 °F (37.4 °C) (Oral)    Resp 16    Ht 5' 4.5\" (1.638 m)    Wt 180 lb (81.6 kg)    SpO2 98%    BMI 30.42 kg/m2          GEN:    Alert and Oriented, No acute distress  Psych:  Mood appropriate, No pressured speech, linear thoughts  CV:    Regular Rhythm, S1 and S2 audible, no MRG, no palpable thrills  Pulm:    CTA B/L, no wheezes/rubs  GI/Abd:   Non-tender to palpation, normal bowel sounds x4, no masses, (-) involuntary or voluntary guarding  Neuro:   Lucid, No focal deficits  ENT:    EOMI, Non-icteric sclera, MMM  Neck:   Trachea midline, no lymphadenopathy  :    No suprapubic tenderness  MSK:  Normal gait, FROM in all four extremities  Skin:   No visible Rash, Ecchymosis, or Excoriations  Lower Ext: Mild 1-2+ edema bilaterally proximal to ankle, LLE with distal to proximal extension of erythema without fluctuance proximal to midfoot, right foot with coldness to palpation, but without erythema. Left foot mildly tender to palpation. Bilateral 1-2 cm non erythematous calcaneal ulcers with clean black eschar                    Assessment/PLAN    1. Cellulitis of left lower extremity  - He has not been doing well with the Dicloxacillin and we will therefore expand the antibiotic coverage to include MRSA. Elliot line on the mid foot and advised family to monitor for progression/regression and call if worsening. Otherwise follow up in 1-2 week for check. - doxycycline (ADOXA) 100 mg tablet; Take 1 Tab by mouth two (2) times a day for 10 days. Dispense: 20 Tab; Refill: 0    2. Type 2 diabetes mellitus with complication, without long-term current use of insulin (HCC)  - Poorly controlled, his diabetic ulcers are well circumscribed with appropriate eschar. See by surgeon and recommended continue with dry dressings. No need for debridement as the eschar creates an appropriate barrier.   - If not improving, consider IV Abx (ceftaz/zosyn) or teflaro, Levaquin for Pseudomonas coverage. Follow-up Disposition:  Return in about 2 weeks (around 3/23/2017). For next visit follow up cellulitis and diabetes    Plan of care:  Discussed diagnoses in detail with patient. Medication risks/benefits/side effects discussed with patient. All of the patient's questions were addressed. The patient understands and agrees with our plan of care.   The patient knows to call back if they are unsure of or forget any changes we discussed today or if the symptoms change. The patient received an After-Visit Summary which contains VS, orders, medication list and allergy list. This can be used as a \"mini-medical record\" should they have to seek medical care while out of town. Aura Quiroz DO  Resident note, PGY-3  Patient discussed with [address] Physician, Dr. Gianni Thacker  Date Time Provider Leatha Pacei   3/23/2017 1:00 PM Aura Quiroz DO [facility] CARITO SCHED   4/4/2017 10:30 AM Latoya Miranda MD [facility] CARITO SCHED   6/13/2017 11:00 AM Ti Reza MD [facility] CARITO SCHED           Current Medications after this visit[de-identified]       Current Outpatient Prescriptions   Medication Sig    dicloxacillin (DYNAPEN) 250 mg capsule Take 250 mg by mouth Before breakfast, lunch, dinner and at bedtime.  loratadine (CLARITIN) 10 mg tablet Take 10 mg by mouth daily.  doxycycline (ADOXA) 100 mg tablet Take 1 Tab by mouth two (2) times a day for 10 days.  traMADol (ULTRAM) 50 mg tablet Take 1 Tab by mouth every six (6) hours as needed for Pain for up to 30 days. Max Daily Amount: 200 mg. Indications: Pain    pregabalin (LYRICA) 150 mg capsule Take 1 Cap by mouth three (3) times daily for 360 days. Max Daily Amount: 450 mg.    triamcinolone acetonide (KENALOG) 0.1 % topical cream Apply 28.4 g to affected area two (2) times a day. use thin layer    LORazepam (ATIVAN) 0.5 mg tablet Take 0.5 mg by mouth every evening.  risperiDONE (RISPERDAL) 1 mg tablet Take 1 mg by mouth daily.  risperiDONE (RISPERDAL) 1 mg tablet Take 2 mg by mouth every evening.  omeprazole (PRILOSEC) 40 mg capsule TAKE ONE CAPSULE BY MOUTH ONCE DAILY FOR  ACID  REFLUX    lisinopril (PRINIVIL, ZESTRIL) 5 mg tablet Take 1 Tab by mouth daily.  meclizine (ANTIVERT) 12.5 mg tablet Take 1 Tab by mouth three (3) times daily as needed.     traZODone (DESYREL) 50 mg tablet TAKE 1 TABLET BY MOUTH NIGHTLY FOR SLEEP    sertraline (ZOLOFT) 100 mg tablet Take 1.5 Tabs by mouth daily.  simvastatin (ZOCOR) 40 mg tablet TAKE 1 TABLET BY MOUTH NIGHTLY    atenolol (TENORMIN) 50 mg tablet Take 1 Tab by mouth daily. (Patient taking differently: Take 25 mg by mouth daily.)    donepezil (ARICEPT) 10 mg tablet Take 1 Tab by mouth daily.  latanoprost (XALATAN) 0.005 % ophthalmic solution Administer 1 Drop to right eye nightly.  aspirin delayed-release 81 mg tablet Take 1 Tab by mouth daily.  naproxen sodium (ALEVE) 220 mg tablet Take 220 mg by mouth two (2) times daily (with meals).  cholecalciferol, vitamin d3, (VITAMIN D) 1,000 unit tablet Take 1,000 Units by mouth daily. Indications: VITAMIN D DEFICIENCY     No current facility-administered medications for this visit.

## 2017-03-10 NOTE — TELEPHONE ENCOUNTER
Spoke with Nancy brizuela, #[phone]. Advised her per Dr. Malina Galvan that we could order the [facility] and to have patient keep his heels off the surface of the bed in the meantime. Nancy brizuela stated that [facility] cannot get [facility] but if we were to write an order to [facility] that they would probably have them. Please write the order/letter for [facility] and I will fax it to [facility]. Thank you.

## 2017-03-10 NOTE — TELEPHONE ENCOUNTER
Izabella Kwong is working on getting Lyrica approved through [facility]. Patients wife wanted to know IF the lyrica is not approved do you think Amitriptyline is something Chikis Addison Isaac could try given his medical history?

## 2017-03-10 NOTE — TELEPHONE ENCOUNTER
Jack Duvall with [facility] called. The family thought that we were going to order some bunny boots for the patient but they have not heard anything. Jack Duvall stated that she thought this would be very beneficial for the patient.   Please call to discuss at [phone]

## 2017-03-10 NOTE — PROGRESS NOTES
I saw and evaluated the patient idependently, performing the key elements of the exam and service. I discussed the findings, assessment and plan with the resident and agree with the resident's findings and plan as documented in the resident's note. Alden Haji M.D.

## 2017-03-14 NOTE — PROGRESS NOTES
Telephone call from his home health nurse. He has diffuse sinus pain and pressure behind his eyes low-grade fever and congestion. He has no body aches no chills. We will treat this as a pansinusitis because of the pressure behind his eyes. Send in Cefzil 500 mg twice daily for 10 days for him. The home health care nurse will take a flu swab and check him tomorrow just to be sure he does not have underlying influenza. The patient is unable to come out of the home.   Dr. Toney Morrison

## 2017-03-15 NOTE — TELEPHONE ENCOUNTER
Royce Arora from [facility] called and said she is supposed to be visiting this patient today and really needs to speak with you regarding him before lunch. She states she will explain when you call.  She may be reached back at [phone]

## 2017-03-15 NOTE — TELEPHONE ENCOUNTER
Returned phone call to Laura Gallegos. She states that she is not allowed to swab the patient for the flu -- Dr. Mackenzie Beach made aware. Medication sent into pharmacy for patient.

## 2017-03-16 NOTE — TELEPHONE ENCOUNTER
Phone call to [facility] -- they have received the order for [facility] but they do not carry them. Phone call to [facility] -- they do carry the [facility] but patients insurance will not cover them. [facility] priced [facility] for $27 a piece. Patients wife is requesting an order for a hospital bed with air mattress -- patient is having breakdown of R calf. Please advise. Thank you.

## 2017-03-16 NOTE — TELEPHONE ENCOUNTER
Nuris with [facility] would like for Dr Suki Vu to order Mr Abdulkadir Fuentes a hospital bed with air mattress for Mr Abdulkadir Fuentes is having breakdown of R calf    Please order also debbie boot for both feet

## 2017-03-16 NOTE — TELEPHONE ENCOUNTER
Returned call to patient to advise Lyrica patient assistance forms faxed 2/24/17. [address] who states they will require another 2-3 weeks to approve or deny assistance. Advised patient I called insurance company and Lyrica only needs prior authorization. Prior auth submitted and awaiting determination. Will advise once this is received.

## 2017-03-17 NOTE — TELEPHONE ENCOUNTER
Home health called to advise patient's vitals today:  P: 49  BP: 138/73  Temp: 95.6  SPO2: 97%  No pain  Asymptomatic  Advised Dr. Jeancarlos Roberts, who states, [facility] SITE is not concerning, close to patient's baseline\". Spoke with patient and advised. Order for hospital bed faxed Zenaida Melissa.

## 2017-03-17 NOTE — LETTER
3/16/2017 9:51 PM 
 
Mr. Amina Law [address] [address] To whom it may concern: Mr. Sarahi Castellanos needs a hospital bed. He has severe dementia and skin breakdown.  
 
 
 
 
Sincerely, 
 
 
Demetri Suazo MD

## 2017-03-22 NOTE — TELEPHONE ENCOUNTER
Mr Lambert Collins has had no contact with facility bringing hospital bed.   Do you have any update, please call spouse, Molly Thomas, at     Ms Lambert Collins is asking for Madi Dickson

## 2017-03-22 NOTE — TELEPHONE ENCOUNTER
Returned call to Chikis Lenna Canavan to advise I spoke with Nathan Moreno at [facility] and she states they are waiting for the mattress to come in for the hospital bed, which should be today or tomorrow. Advised bed should be delivered tomorrow or Friday. Chikis Lenna Canavan expressed understanding.

## 2017-03-27 NOTE — TELEPHONE ENCOUNTER
Marcos Cantu from [facility] called. She is requesting that an order for a standard wheelchair with elevating legs be sent in for the patient. He is having great difficulty ambulating around the house.   Marcos Cantu can be reached at [phone]

## 2017-03-27 NOTE — LETTER
3/28/2017 10:04 AM 
 
Mr. Subha Steve : 1945 
[address] [address] Please provide a standard wheelchair with elevating legs for the patient. He is having great difficulty ambulating around the house. DX:G30.1 NW:909.6 Sincerely, 
 
 
Kimberlee Kauffman MD

## 2017-03-29 NOTE — TELEPHONE ENCOUNTER
Called patient to advise that he was approved for patient assistance through [employer] and should receive his shipment of medication soon. Patient's wife expressed understanding.

## 2017-04-08 PROBLEM — J69.0 ASPIRATION PNEUMONIA (HCC): Status: ACTIVE | Noted: 2017-01-01

## 2017-04-08 PROBLEM — R53.1 RIGHT SIDED WEAKNESS: Status: ACTIVE | Noted: 2017-01-01

## 2017-04-08 NOTE — ED TRIAGE NOTES
Pt. Had a near syncopal episode at 1230 and afterwards spouse reported a facial droop. Code S call from the field. Pt. Has dementia and is at baseline per spouse. No facial droop visible at this time.

## 2017-04-08 NOTE — IP AVS SNAPSHOT
Minnie  
 
 
 [address] [place] [address] 
[phone] Patient: Rachel Og MRN: [id number] IBU:4/0/7263 You are allergic to the following Allergen Reactions Hydrocodone-Acetaminophen Rash Morbiliform exanthem rash Tolerates tramadol Morphine Rash Erythromycin Nausea and Vomiting  
    
 Sulfa (Sulfonamide Antibiotics) Rash Adhesive Tape-Silicones Hives Cinnamon Nausea Only Codeine Other (comments) Hallucinations Tolerates tramadol  
    
 Gabapentin Rash Tolerates pregabalin Shellfish Derived Nausea and Vomiting Recent Documentation Height Weight BMI Smoking Status 1.651 m 83.3 kg 30.56 kg/m2 Never Smoker Unresulted Labs Order Current Status SAMPLE TO BLOOD BANK In process CULTURE, BLOOD Preliminary result CULTURE, BLOOD Preliminary result Emergency Contacts Name Discharge Info Relation Home Work Mobile Pamela Olmedo CAREGIVER [3] Spouse [3] [phone] [address] [facility] CAREGIVER [3] Daughter [21] [phone] About your hospitalization You were admitted on:  April 8, 2017 You last received care in the:  [facility] 5M1 MED SURG 1 You were discharged on:  April 11, 2017 Unit phone number:  [phone] Why you were hospitalized Your primary diagnosis was:  Not on File Your diagnoses also included:  Aspiration Pneumonia (Hcc), Right Sided Weakness Providers Seen During Your Hospitalizations Provider Role Specialty Primary office phone Eliana Phillips MD Attending Provider Emergency Medicine [phone] Stella Casas MD Attending Provider Family Practice [phone] Nii Little MD Attending Provider [facility] [phone] Your Primary Care Physician (PCP) Primary Care Physician Office Phone Office Fax OTHER, PHYS ** None ** ** None ** Follow-up Information Follow up With Details Comments Contact Info [place]   [address] [place] [address] 
[phone] Phys Andrea, MD   Patient can only remember the practice name and not the physician Current Discharge Medication List  
  
START taking these medications Dose & Instructions Dispensing Information Comments Morning Noon Evening Bedtime  
 amoxicillin-clavulanate 875-125 mg per tablet Commonly known as:  AUGMENTIN Your last dose was: Your next dose is:    
   
   
 Dose:  1 Tab Take 1 Tab by mouth two (2) times a day for 4 days. Quantity:  8 Tab Refills:  0 CONTINUE these medications which have NOT CHANGED Dose & Instructions Dispensing Information Comments Morning Noon Evening Bedtime ALEVE 220 mg tablet Generic drug:  naproxen sodium Your last dose was: Your next dose is:    
   
   
 Dose:  220 mg Take 220 mg by mouth two (2) times daily as needed for Pain. Give with food Refills:  0  
     
   
   
   
  
 ARICEPT 10 mg tablet Generic drug:  donepezil Your last dose was: Your next dose is:    
   
   
 Dose:  10 mg Take 10 mg by mouth daily (with dinner). Refills:  0  
     
   
   
   
  
 aspirin delayed-release 81 mg tablet Your last dose was: Your next dose is:    
   
   
 Dose:  81 mg Take 1 Tab by mouth daily. Refills:  0  
     
   
   
   
  
 atenolol 50 mg tablet Commonly known as:  TENORMIN Your last dose was: Your next dose is:    
   
   
 Dose:  25 mg Take 25 mg by mouth daily. Refills:  0 CLARITIN 10 mg tablet Generic drug:  loratadine Your last dose was: Your next dose is:    
   
   
 Dose:  10 mg Take 10 mg by mouth daily. Refills:  0 latanoprost 0.005 % ophthalmic solution Commonly known as:  Yi Woods Your last dose was: Your next dose is:    
   
   
 Dose:  1 Drop Administer 1 Drop to right eye nightly. Refills:  0  
     
   
   
   
  
 lisinopril 5 mg tablet Commonly known as:  Maximus Mark Your last dose was: Your next dose is:    
   
   
 Dose:  5 mg Take 1 Tab by mouth daily. Quantity:  30 Tab Refills:  5 LORazepam 0.5 mg tablet Commonly known as:  ATIVAN Your last dose was: Your next dose is:    
   
   
 Dose:  0.5 mg Take 0.5 mg by mouth daily (with dinner). Refills:  0  
     
   
   
   
  
 lutein-zeaxanthin 25-5 mg Cap Your last dose was: Your next dose is:    
   
   
 Dose:  1 Cap Take 1 Cap by mouth daily (with lunch). Refills:  0 LYRICA 150 mg capsule Generic drug:  pregabalin Your last dose was: Your next dose is:    
   
   
 Dose:  150 mg Take 150 mg by mouth two (2) times a day. At 08:00 and 17:00 Refills:  0  
     
   
   
   
  
 meclizine 12.5 mg tablet Commonly known as:  ANTIVERT Your last dose was: Your next dose is:    
   
   
 Dose:  12.5 mg Take 1 Tab by mouth three (3) times daily as needed. Quantity:  90 Tab Refills:  4  
     
   
   
   
  
 nicotinic acid 500 mg tablet Commonly known as:  NIACIN Your last dose was: Your next dose is:    
   
   
 Dose:  500 mg Take 500 mg by mouth daily (with lunch). Refills:  0  
     
   
   
   
  
 omeprazole 40 mg capsule Commonly known as:  PRILOSEC Your last dose was: Your next dose is: TAKE ONE CAPSULE BY MOUTH ONCE DAILY FOR  ACID  REFLUX Quantity:  90 Cap Refills:  3  
     
   
   
   
  
 * RisperDAL 1 mg tablet Generic drug:  risperiDONE Your last dose was: Your next dose is:    
   
   
 Dose:  1 mg Take 1 mg by mouth daily (with breakfast). Refills:  0  
     
   
   
   
  
 * RisperDAL 1 mg tablet Generic drug:  risperiDONE Your last dose was: Your next dose is:    
   
   
 Dose:  2 mg Take 2 mg by mouth daily (with dinner). Refills:  0  
     
   
   
   
  
 sertraline 100 mg tablet Commonly known as:  ZOLOFT Your last dose was: Your next dose is:    
   
   
 Dose:  150 mg Take 1.5 Tabs by mouth daily. Quantity:  45 Tab Refills:  11  
     
   
   
   
  
 simvastatin 40 mg tablet Commonly known as:  ZOCOR Your last dose was: Your next dose is: TAKE 1 TABLET BY MOUTH NIGHTLY Quantity:  90 Tab Refills:  3 therapeutic multivitamin tablet Commonly known as:  [facility] Your last dose was: Your next dose is:    
   
   
 Dose:  1 Tab Take 1 Tab by mouth daily. Refills:  0  
     
   
   
   
  
 traMADol 50 mg tablet Commonly known as:  ULTRAM  
   
Your last dose was: Your next dose is:    
   
   
 Dose:  100 mg Take 100 mg by mouth every six (6) hours as needed for Pain. Refills:  0  
     
   
   
   
  
 traZODone 50 mg tablet Commonly known as:  Maya Mathis Your last dose was: Your next dose is: TAKE 1 TABLET BY MOUTH NIGHTLY FOR SLEEP Quantity:  30 Tab Refills:  5 VITAMIN D3 1,000 unit tablet Generic drug:  cholecalciferol Your last dose was: Your next dose is:    
   
   
 Dose:  1000 Units Take 1,000 Units by mouth daily. Indications: VITAMIN D DEFICIENCY Refills:  0  
     
   
   
   
  
 * Notice: This list has 2 medication(s) that are the same as other medications prescribed for you. Read the directions carefully, and ask your doctor or other care provider to review them with you. Where to Get Your Medications Information on where to get these meds will be given to you by the nurse or doctor. ! Ask your nurse or doctor about these medications  
  amoxicillin-clavulanate 875-125 mg per tablet Discharge Instructions HOME HOSPICE DISCHARGE INSTRUCTIONS Lisa Cantor / [id number] : 1945 Admission date: 2017 Discharge date: 2017 Primary care provider: Jamari Mendez MD 
 
Discharging provider:  Teri Dutta MD  - Family Medicine Resident Bernardo Moreno MD - Attending, Family Medicine Henna Bullard . . . . . . . . . . . . . . . . . . . . . . . . . . . . . . . . . . . . . . . . . . . . . . . . . . . . . . . . . . . . . . . . . . . . . . Henna Bullard FINAL DIAGNOSES & HOSPITAL COURSE: 
Lisa Cantor is a 66 y.o. male with Alzheimer's Dementia, T2DM with peripheral neuropathy, HLD, spinal stenosis, GERD, HTN, Anxiety/Major depression who is admitted for aspiration pneumonia and stroke/TIA rule out. CXR showed right basilar opacity that was concerning for aspiration PNA. Pt treated with Unasyn during admission. Blood cultures negative x3d. Stroke work-up negative so far. ECHO from : EF 60 % to 65 %, no regional wall motion abnormalities. A1C 6.2. Lipid panel WNL. Neurology followed patient and etiology likely near-syncopal episode vs. TIA. On day of discharge: Pt afebrile and VSS. Have decided on discharge with home hospice which has been set up. Will discharge with 4 days of augmentin to complete treatment for aspiration PNA. New Medications: Augmentin - take 1 tab every twice daily (once in morning and once at night) for 4 days. All other medications as per home hospice recommendations PENDING TEST RESULTS: 
At the time of discharge the following test results are still pending: None Please review these results as they become available.  
 
Specific symptoms to watch for: chest pain, shortness of breath, fever, chills, nausea, vomiting, diarrhea, change in mentation, falling, weakness, bleeding. DIET:  Dysphagia 1 honey thick liquid ACTIVITY:  Bedrest 
 
WOUND CARE: As previously caring for diabetic foot ulcer and sacral ulcer EQUIPMENT needed: To be determined by hospice GOALS OF CARE: 
  Eventual return to home/independent/assisted living Long term SNF   
X  Home Hospice No rehospitalization Patient condition at discharge:  
Functional status X  Poor Deconditioned Independent Cognition Ebbie Valdez Forgetful (some sensescence) X  Dementia Catheters/lines (plus indication) Rayo PICC   
  PEG   
X  None Code status Full code X  DNR Berto Sy . . . . . . . . . . . . . . . . . . . . . . . . . . . . . . . . . . . . . . . . . . . . . . . . . . . . . . . . . . . . . . . . . . . . . . Berto Sy CHRONIC MEDICAL CONDITIONS: 
Problem List as of 4/11/2017  Date Reviewed: 4/10/2017 Codes Class Noted - Resolved Aspiration pneumonia ([facility] ICD-10-CM: J69.0 ICD-9-CM: 507.0  4/8/2017 - Present Right sided weakness ICD-10-CM: R53.1 ICD-9-CM: 728.87  4/8/2017 - Present Rash and nonspecific skin eruption ICD-10-CM: R21 
ICD-9-CM: 782.1  2/17/2017 - Present Dementia without behavioral disturbance ICD-10-CM: F03.90 ICD-9-CM: 294.20  6/22/2016 - Present Severe single current episode of major depressive disorder, with psychotic features ([facility].) (Chronic) ICD-10-CM: F32.3 ICD-9-CM: 296.24  6/22/2016 - Present Diabetes mellitus type 2 with complications (HCC) (Chronic) ICD-10-CM: E11.8 ICD-9-CM: 250.90  3/1/2016 - Present Late onset Alzheimer's disease with behavioral disturbance (Chronic) ICD-10-CM: G30.1, F02.81 ICD-9-CM: 331.0, 294.11  3/1/2016 - Present Anxiety ICD-10-CM: F41.9 ICD-9-CM: 300.00  5/8/2015 - Present S/P colonoscopy (Chronic) ICD-10-CM: C59.388 ICD-9-CM: V45.89  5/28/2014 - Present Overview Signed 5/28/2014 10:48 AM by Zeynep Miranda MD  
  2010 by Dr. Talita Crane. IBS. Arthritis of neck (HCC) (Chronic) ICD-10-CM: M46.92 
ICD-9-CM: 721.0  5/16/2013 - Present Glaucoma ICD-10-CM: H40.9 ICD-9-CM: 365.9  3/29/2011 - Present Positional vertigo (Chronic) ICD-10-CM: H81.10 ICD-9-CM: 386.11  3/29/2011 - Present Tinnitus (Chronic) ICD-10-CM: H93.19 ICD-9-CM: 388.30  3/29/2011 - Present Overview Signed 3/29/2011 10:56 AM by Colleen Smith MD  
  Right Ear. Hearing loss, sensorineural (Chronic) ICD-10-CM: H90.5 ICD-9-CM: 389.10  3/29/2011 - Present Overview Signed 3/29/2011 10:57 AM by Colleen Smith MD  
  Both ears Gastritis (Chronic) ICD-10-CM: K29.70 ICD-9-CM: 535.50  9/30/2010 - Present Overview Signed 9/30/2010 11:11 AM by Colleen Smith MD  
  Had endoscopy and colonoscopy in 6/10. HAS IBS. Dr Melida Mendoza. Pure hypercholesterolemia (Chronic) ICD-10-CM: E78.00 ICD-9-CM: 272.0 Chronic Unknown - Present Essential hypertension, benign (Chronic) ICD-10-CM: I10 
ICD-9-CM: 019. 1 Chronic 2/24/2010 - Present Spinal stenosis, unspecified region other than cervical (Chronic) ICD-10-CM: M48.00 ICD-9-CM: 724.00 Chronic 2/24/2010 - Present Overview Signed 12/28/2010 10:42 AM by MD Dr. Benjamin Whelan. Legs will go numb after standing 3 minute. Horseshoe kidney (Chronic) ICD-10-CM: Q63.1 ICD-9-CM: 692. 3 Chronic 2/24/2010 - Present Asplenia (Chronic) ICD-10-CM: Q89.01 
ICD-9-CM: 759.0 Chronic 2/24/2010 - Present Osteoarthrosis, unspecified whether generalized or localized, other specified sites (Chronic) ICD-10-CM: M19.90 ICD-9-CM: 715.98 Chronic 2/24/2010 - Present Controlled type 2 diabetes mellitus with skin complication, without long-term current use of insulin (HCC) (Chronic) ICD-10-CM: N12.282 ICD-9-CM: 250.80 Chronic 2/24/2010 - Present Irritable bowel syndrome (Chronic) ICD-10-CM: K58.9 ICD-9-CM: 564.1 Chronic 2/24/2010 - Present Information obtained by :  
I understand that if any problems occur once I am at home I am to contact my physician. I understand and acknowledge receipt of the instructions indicated above. Physician's or R.N.'s Signature                                                                  Date/Time Patient or Representative Signature                                                          Date/Time Discharge Orders None ACO Transitions of Care Introducing [employer] [address] offers a voluntary care coordination program to provide high quality service and care to [facility] fee-for-service beneficiaries. Cheyennemartalotus Han was designed to help you enhance your health and well-being through the following services: ? Transitions of Care  support for individuals who are transitioning from one care setting to another (example: Hospital to home). ? Chronic and Complex Care Coordination  support for individuals and caregivers of those with serious or chronic illnesses or with more than one chronic (ongoing) condition and those who take a number of different medications. If you meet specific medical criteria, a [facility] [address] may call you directly to coordinate your care with your primary care physician and your other care providers. For questions about the [facility] programs, please, contact your physicians office. For general questions or additional information about Accountable Care Organizations: 
Please visit www.medicare.gov/acos. html or call 1-800-MEDICARE (8-715.314.1110) TTY users should call 9-156.774.2956. [employer] Announcement We are excited to announce that we are making your provider's discharge notes available to you in [employer]. You will see these notes when they are completed and signed by the physician that discharged you from your recent hospital stay. If you have any questions or concerns about any information you see in [employer], please call the Health Information Department where you were seen or reach out to your Primary Care Provider for more information about your plan of care. General Information Please provide this summary of care documentation to your next provider. Patient Signature:  ____________________________________________________________ Date:  ____________________________________________________________  
  
Any Mejia Provider Signature:  ____________________________________________________________ Date:  ____________________________________________________________

## 2017-04-08 NOTE — IP AVS SNAPSHOT
Summary of Care Report The Summary of Care report has been created to help improve care coordination. Users with access to [employer] or [address] (Web-based application) may access additional patient information including the Discharge Summary. If you are not currently a [address] user and need more information, please call the number listed below in the [place] [address] section and ask to be connected with Medical Records. Facility Information Name Address Phone [address] [place] [id number] [phone] Patient Information Patient Name Sex LORENZA Osborn ([id number]) Male 1945 Discharge Information Admitting Provider Service Area Unit Nancy Hernandez MD / [id number] [facility] Med Surg  / [phone] Discharge Provider Discharge Date/Time Discharge Disposition Destination (none) 2017 (Pending) HHO (none) Patient Language Language ENGLISH [13] Hospital Problems as of 2017  Reviewed: 4/10/2017  8:02 AM by Agnieszka Fowler NP Class Noted - Resolved Last Modified POA Active Problems Aspiration pneumonia ([address]  2017 - Present 2017 by Kely Wu MD Unknown Entered by Kely Wu MD  
  Right sided weakness  2017 - Present 2017 by Kely Wu MD Unknown Entered by Kely Wu MD  
  
Non-Hospital Problems as of 2017  Reviewed: 4/10/2017  8:02 AM by Agnieszka Fowler NP Class Noted - Resolved Last Modified Active Problems   Pure hypercholesterolemia (Chronic) Chronic Unknown - Present 2017 by Syl Beard MD  
  Entered by Flako Santiago MD  
  Essential hypertension, benign (Chronic) Chronic 2010 - Present 2017 by Syl Beard MD  
  Entered by Flako Santiago MD  
 Spinal stenosis, unspecified region other than cervical (Chronic) Chronic 2/24/2010 - Present 2/17/2017 by Raymond Smyth MD  
  Entered by Andrew Lopez MD  
  Overview Signed 12/28/2010 10:42 AM by MD Dr. Napoleon Pierce. Legs will go numb after standing 3 minute. Horseshoe kidney (Chronic) Chronic 2/24/2010 - Present 2/24/2010 by Andrew Lopez MD  
  Entered by Andrew Lopez MD  
  Asplenia (Chronic) Chronic 2/24/2010 - Present 2/24/2010 by Andrew Lopez MD  
  Entered by Andrew Lopez MD  
  Osteoarthrosis, unspecified whether generalized or localized, other specified sites (Chronic) Chronic 2/24/2010 - Present 2/24/2010 by Andrew Lopez MD  
  Entered by Andrew Lopez MD  
  Controlled type 2 diabetes mellitus with skin complication, without long-term current use of insulin ([facility] (Chronic) Chronic 2/24/2010 - Present 2/18/2017 by Raymond Smyth MD  
  Entered by Andrew Lopez MD  
  Irritable bowel syndrome (Chronic) Chronic 2/24/2010 - Present 2/24/2010 by Andrew Lopez MD  
  Entered by Andrew Lopez MD  
  Gastritis (Chronic)  9/30/2010 - Present 9/30/2010 by Andrew Lopez MD  
  Entered by Andrew Lopez MD  
  Overview Signed 9/30/2010 11:11 AM by Andrew Lopez MD  
   Had endoscopy and colonoscopy in 6/10. HAS IBS. Dr Abhijit Erwin. Glaucoma  3/29/2011 - Present 3/29/2011 by Andrew Lopez MD  
  Entered by Andrew Lopez MD  
  Positional vertigo (Chronic)  3/29/2011 - Present 2/17/2017 by Raymond Smyth MD  
  Entered by Andrew Lopez MD  
  Tinnitus (Chronic)  3/29/2011 - Present 3/29/2011 by Andrew Lopez MD  
  Entered by Andrew Lopez MD  
  Overview Signed 3/29/2011 10:56 AM by Andrew Lopez MD  
   Right Ear.  
  
  
  Hearing loss, sensorineural (Chronic)  3/29/2011 - Present 3/29/2011 by Andrew Lopez MD  
  Entered by Andrew Lopez MD  
 Overview Signed 3/29/2011 10:57 AM by Eliecer Gómez MD  
   Both ears Arthritis of neck ([facility] (Chronic)  5/16/2013 - Present 5/16/2013 by Eliecer Gómez MD  
  Entered by Eliecer Gómez MD  
  S/P colonoscopy (Chronic)  5/28/2014 - Present 5/28/2014 by Eliecer Gómez MD  
  Entered by Eliecer Gómez MD  
  Overview Signed 5/28/2014 10:48 AM by Eliecer Gómez MD  
   2010 by Dr. Huggins. IBS. Anxiety  5/8/2015 - Present 5/8/2015 by Eliecer Gómez MD  
  Entered by Eliecer Gómez MD  
  Diabetes mellitus type 2 with complications [facility]) (Chronic)  3/1/2016 - Present 3/1/2016 by Eliecer Gómez MD  
  Entered by Eliecer Gómez MD  
  Late onset Alzheimer's disease with behavioral disturbance (Chronic)  3/1/2016 - Present 2/18/2017 by Sandor Estrada MD  
  Entered by Eliecer Gómez MD  
  Dementia without behavioral disturbance  6/22/2016 - Present 6/22/2016 by Eliecer Gómez MD  
  Entered by Eliecer Gómez MD  
  Severe single current episode of major depressive disorder, with psychotic features [facility]) (Chronic)  6/22/2016 - Present 6/22/2016 by Eliecer Gómez MD  
  Entered by Eliecer Gómez MD  
  Rash and nonspecific skin eruption  2/17/2017 - Present 2/18/2017 by Sandor Estrada MD  
  Entered by Jesse Jose MD  
  
You are allergic to the following Allergen Reactions Hydrocodone-Acetaminophen Rash Morbiliform exanthem rash Tolerates tramadol Morphine Rash Erythromycin Nausea and Vomiting  
    
 Sulfa (Sulfonamide Antibiotics) Rash Adhesive Tape-Silicones Hives Cinnamon Nausea Only Codeine Other (comments) Hallucinations Tolerates tramadol  
    
 Gabapentin Rash Tolerates pregabalin Shellfish Derived Nausea and Vomiting Current Discharge Medication List  
  
START taking these medications Dose & Instructions Dispensing Information Comments amoxicillin-clavulanate 875-125 mg per tablet Commonly known as:  AUGMENTIN Dose:  1 Tab Take 1 Tab by mouth two (2) times a day for 4 days. Quantity:  8 Tab Refills:  0 CONTINUE these medications which have NOT CHANGED Dose & Instructions Dispensing Information Comments ALEVE 220 mg tablet Generic drug:  naproxen sodium Dose:  220 mg Take 220 mg by mouth two (2) times daily as needed for Pain. Give with food Refills:  0  
   
 ARICEPT 10 mg tablet Generic drug:  donepezil Dose:  10 mg Take 10 mg by mouth daily (with dinner). Refills:  0  
   
 aspirin delayed-release 81 mg tablet Dose:  81 mg Take 1 Tab by mouth daily. Refills:  0  
   
 atenolol 50 mg tablet Commonly known as:  TENORMIN Dose:  25 mg Take 25 mg by mouth daily. Refills:  0 CLARITIN 10 mg tablet Generic drug:  loratadine Dose:  10 mg Take 10 mg by mouth daily. Refills:  0  
   
 latanoprost 0.005 % ophthalmic solution Commonly known as:  Rachana Loose Dose:  1 Drop Administer 1 Drop to right eye nightly. Refills:  0  
   
 lisinopril 5 mg tablet Commonly known as:  Dulcie Chaiey Dose:  5 mg Take 1 Tab by mouth daily. Quantity:  30 Tab Refills:  5 LORazepam 0.5 mg tablet Commonly known as:  ATIVAN Dose:  0.5 mg Take 0.5 mg by mouth daily (with dinner). Refills:  0  
   
 lutein-zeaxanthin 25-5 mg Cap Dose:  1 Cap Take 1 Cap by mouth daily (with lunch). Refills:  0 LYRICA 150 mg capsule Generic drug:  pregabalin Dose:  150 mg Take 150 mg by mouth two (2) times a day. At 08:00 and 17:00 Refills:  0  
   
 meclizine 12.5 mg tablet Commonly known as:  ANTIVERT Dose:  12.5 mg Take 1 Tab by mouth three (3) times daily as needed. Quantity:  90 Tab Refills:  4  
   
 nicotinic acid 500 mg tablet Commonly known as:  NIACIN Dose:  500 mg Take 500 mg by mouth daily (with lunch). Refills:  0 omeprazole 40 mg capsule Commonly known as:  PRILOSEC  
 TAKE ONE CAPSULE BY MOUTH ONCE DAILY FOR  ACID  REFLUX Quantity:  90 Cap Refills:  3  
   
 * RisperDAL 1 mg tablet Generic drug:  risperiDONE Dose:  1 mg Take 1 mg by mouth daily (with breakfast). Refills:  0  
   
 * RisperDAL 1 mg tablet Generic drug:  risperiDONE Dose:  2 mg Take 2 mg by mouth daily (with dinner). Refills:  0  
   
 sertraline 100 mg tablet Commonly known as:  ZOLOFT Dose:  150 mg Take 1.5 Tabs by mouth daily. Quantity:  45 Tab Refills:  11  
   
 simvastatin 40 mg tablet Commonly known as:  ZOCOR  
 TAKE 1 TABLET BY MOUTH NIGHTLY Quantity:  90 Tab Refills:  3 therapeutic multivitamin tablet Commonly known as:  [facility] Dose:  1 Tab Take 1 Tab by mouth daily. Refills:  0  
   
 traMADol 50 mg tablet Commonly known as:  ULTRAM  
 Dose:  100 mg Take 100 mg by mouth every six (6) hours as needed for Pain. Refills:  0  
   
 traZODone 50 mg tablet Commonly known as:  DESYREL  
 TAKE 1 TABLET BY MOUTH NIGHTLY FOR SLEEP Quantity:  30 Tab Refills:  5 VITAMIN D3 1,000 unit tablet Generic drug:  cholecalciferol Dose:  1000 Units Take 1,000 Units by mouth daily. Indications: VITAMIN D DEFICIENCY Refills:  0  
   
 * Notice: This list has 2 medication(s) that are the same as other medications prescribed for you. Read the directions carefully, and ask your doctor or other care provider to review them with you. Current Immunizations Name Date Influenza Vaccine 9/15/2016, 9/17/2015, 9/18/2014, 10/17/2013 Influenza Vaccine Split 10/11/2012, 10/5/2009 Influenza Vaccine Whole 9/21/2011, 9/28/2010 Pneumococcal Conjugate (PCV-13) 3/15/2016 Pneumococcal Polysaccharide (PPSV-23) 12/13/2012 Pneumococcal Vaccine (Pcv) 11/24/2008 TDAP Vaccine 11/7/2011 Zoster Vaccine, Live 9/18/2014 Follow-up Information Follow up With Details Comments Contact Info [place]   [address] [place] [address] 
[phone] Jamari Mendez MD   Patient can only remember the practice name and not the physician Discharge Instructions HOME HOSPICE DISCHARGE INSTRUCTIONS Lisa Cantor / [id number] : 1945 Admission date: 2017 Discharge date: 2017 Primary care provider: Jamari Mendez MD 
 
Discharging provider:  Teri Dutta MD  - Family Medicine Resident Bernardo Moreno MD - Attending, Family Medicine Henna Bullard . . . . . . . . . . . . . . . . . . . . . . . . . . . . . . . . . . . . . . . . . . . . . . . . . . . . . . . . . . . . . . . . . . . . . . Henna Bullard FINAL DIAGNOSES & HOSPITAL COURSE: 
Lisa Cantor is a 66 y.o. male with Alzheimer's Dementia, T2DM with peripheral neuropathy, HLD, spinal stenosis, GERD, HTN, Anxiety/Major depression who is admitted for aspiration pneumonia and stroke/TIA rule out. CXR showed right basilar opacity that was concerning for aspiration PNA. Pt treated with Unasyn during admission. Blood cultures negative x3d. Stroke work-up negative so far. ECHO from : EF 60 % to 65 %, no regional wall motion abnormalities. A1C 6.2. Lipid panel WNL. Neurology followed patient and etiology likely near-syncopal episode vs. TIA. On day of discharge: Pt afebrile and VSS. Have decided on discharge with home hospice which has been set up. Will discharge with 4 days of augmentin to complete treatment for aspiration PNA. New Medications: Augmentin - take 1 tab every twice daily (once in morning and once at night) for 4 days. All other medications as per home hospice recommendations PENDING TEST RESULTS: 
At the time of discharge the following test results are still pending: None Please review these results as they become available.  
 
Specific symptoms to watch for: chest pain, shortness of breath, fever, chills, nausea, vomiting, diarrhea, change in mentation, falling, weakness, bleeding. DIET:  Dysphagia 1 honey thick liquid ACTIVITY:  Bedrest 
 
WOUND CARE: As previously caring for diabetic foot ulcer and sacral ulcer EQUIPMENT needed: To be determined by hospice GOALS OF CARE: 
  Eventual return to home/independent/assisted living Long term SNF   
X  Home Hospice No rehospitalization Patient condition at discharge:  
Functional status X  Poor Deconditioned Independent Cognition Luevenia Chávez Forgetful (some sensescence) X  Dementia Catheters/lines (plus indication) Rayo PICC   
  PEG   
X  None Code status Full code X  DNR Monisha Potter . . . . . . . . . . . . . . . . . . . . . . . . . . . . . . . . . . . . . . . . . . . . . . . . . . . . . . . . . . . . . . . . . . . . . . Monisha Potter CHRONIC MEDICAL CONDITIONS: 
Problem List as of 4/11/2017  Date Reviewed: 4/10/2017 Codes Class Noted - Resolved Aspiration pneumonia ([facility] ICD-10-CM: J69.0 ICD-9-CM: 507.0  4/8/2017 - Present Right sided weakness ICD-10-CM: R53.1 ICD-9-CM: 728.87  4/8/2017 - Present Rash and nonspecific skin eruption ICD-10-CM: R21 
ICD-9-CM: 782.1  2/17/2017 - Present Dementia without behavioral disturbance ICD-10-CM: F03.90 ICD-9-CM: 294.20  6/22/2016 - Present Severe single current episode of major depressive disorder, with psychotic features ([facility] (Chronic) ICD-10-CM: F32.3 ICD-9-CM: 296.24  6/22/2016 - Present Diabetes mellitus type 2 with complications (HCC) (Chronic) ICD-10-CM: E11.8 ICD-9-CM: 250.90  3/1/2016 - Present Late onset Alzheimer's disease with behavioral disturbance (Chronic) ICD-10-CM: G30.1, F02.81 ICD-9-CM: 331.0, 294.11  3/1/2016 - Present Anxiety ICD-10-CM: F41.9 ICD-9-CM: 300.00  5/8/2015 - Present S/P colonoscopy (Chronic) ICD-10-CM: Y67.164 ICD-9-CM: V45.89  5/28/2014 - Present Overview Signed 5/28/2014 10:48 AM by Geoff Conde MD  
  2010 by Dr. Tu Fagan. IBS. Arthritis of neck (HCC) (Chronic) ICD-10-CM: M46.92 
ICD-9-CM: 721.0  5/16/2013 - Present Glaucoma ICD-10-CM: H40.9 ICD-9-CM: 365.9  3/29/2011 - Present Positional vertigo (Chronic) ICD-10-CM: H81.10 ICD-9-CM: 386.11  3/29/2011 - Present Tinnitus (Chronic) ICD-10-CM: H93.19 ICD-9-CM: 388.30  3/29/2011 - Present Overview Signed 3/29/2011 10:56 AM by Geoff Conde MD  
  Right Ear. Hearing loss, sensorineural (Chronic) ICD-10-CM: H90.5 ICD-9-CM: 389.10  3/29/2011 - Present Overview Signed 3/29/2011 10:57 AM by Geoff Conde MD  
  Both ears Gastritis (Chronic) ICD-10-CM: K29.70 ICD-9-CM: 535.50  9/30/2010 - Present Overview Signed 9/30/2010 11:11 AM by Geoff Conde MD  
  Had endoscopy and colonoscopy in 6/10. HAS IBS. Dr Tu Fagan. Pure hypercholesterolemia (Chronic) ICD-10-CM: E78.00 ICD-9-CM: 272.0 Chronic Unknown - Present Essential hypertension, benign (Chronic) ICD-10-CM: I10 
ICD-9-CM: 986. 1 Chronic 2/24/2010 - Present Spinal stenosis, unspecified region other than cervical (Chronic) ICD-10-CM: M48.00 ICD-9-CM: 724.00 Chronic 2/24/2010 - Present Overview Signed 12/28/2010 10:42 AM by Vonn Schlichter, MD Dr. Hebert Sacks. Legs will go numb after standing 3 minute. Horseshoe kidney (Chronic) ICD-10-CM: Q63.1 ICD-9-CM: 567. 3 Chronic 2/24/2010 - Present Asplenia (Chronic) ICD-10-CM: Q89.01 
ICD-9-CM: 759.0 Chronic 2/24/2010 - Present Osteoarthrosis, unspecified whether generalized or localized, other specified sites (Chronic) ICD-10-CM: M19.90 ICD-9-CM: 715.98 Chronic 2/24/2010 - Present Controlled type 2 diabetes mellitus with skin complication, without long-term current use of insulin (HCC) (Chronic) ICD-10-CM: Y89.267 ICD-9-CM: 250.80 Chronic 2/24/2010 - Present Irritable bowel syndrome (Chronic) ICD-10-CM: K58.9 ICD-9-CM: 564.1 Chronic 2/24/2010 - Present Information obtained by :  
I understand that if any problems occur once I am at home I am to contact my physician. I understand and acknowledge receipt of the instructions indicated above. Physician's or R.N.'s Signature                                                                  Date/Time Patient or Representative Signature                                                          Date/Time Chart Review Routing History No Routing History on File

## 2017-04-08 NOTE — H&P
[address] [facility] [address] [place] [address]   Office [phone]  Fax [phone]       Admission H&P     Name: Chente Navarro MRN: [id number]  Sex: Male   YOB: 1945  Age: 66 y.o. PCP: Jamari Mendez MD     Source of Information: patient, medical records    Chief complaint: \"He was out of it for a minute. \"    History of Present Illness  Chente Navarro is a 66 y.o. male with known T2DM with peripheral neuropathy, HLD, spinal stenosis, Alzheimer's Dementia, GERD, HTN, Anxiety/Major depression who was brought to the ED by EMS (with his family at bedside) following an unresponsive episode at home. The patient cannot give a history himself, most of the history was obtained from the patient's wife and family members present at the bedside. They went to set the patient up in a wheelchair from his home hospital bed this morning. After sitting for a while, the patient dropped his head and became unresponsive. He was still conscious, but the family reports that he was very pale and mumbling. A blood pressure was taken:  55/36. The patient was noted to have a right sided facial droop and right sided weakness. EMS was called at that time, and the patient was brought to [facility]. En route, the patient's blood pressure was noted to be 70s/40s. In the ER, vital signs were remarkable for BP down to 79/44 (improved to 107/53 with IVF). Labs were remarkable for leukocytosis of 16.2. CXR showed minimal right basilar opacaties. Pt was treated with 30cc/kg IVF, and started on levaquin and cefepime. During conversation with the family, the patient's wife reports that the family is interested in pursuing home hospice options. Home Medications   Prior to Admission medications    Medication Sig Start Date End Date Taking? Authorizing Provider   nicotinic acid (NIACIN) 500 mg tablet Take 500 mg by mouth daily (with lunch). Yes Jamari Mendez MD   atenolol (TENORMIN) 50 mg tablet Take 25 mg by mouth daily.    Yes Historical Provider   donepezil (ARICEPT) 10 mg tablet Take 10 mg by mouth daily (with dinner). Yes Historical Provider   pregabalin (LYRICA) 150 mg capsule Take 150 mg by mouth two (2) times a day. At 08:00 and 17:00   Yes Historical Provider   traMADol (ULTRAM) 50 mg tablet Take 100 mg by mouth every six (6) hours as needed for Pain. Yes Historical Provider   lutein-zeaxanthin 25-5 mg cap Take 1 Cap by mouth daily (with lunch). Yes Historical Provider   therapeutic multivitamin (THERAGRAN) tablet Take 1 Tab by mouth daily. Yes Historical Provider   loratadine (CLARITIN) 10 mg tablet Take 10 mg by mouth daily. Yes Historical Provider   LORazepam (ATIVAN) 0.5 mg tablet Take 0.5 mg by mouth daily (with dinner). Yes Historical Provider   risperiDONE (RISPERDAL) 1 mg tablet Take 1 mg by mouth daily (with breakfast). Yes Historical Provider   risperiDONE (RISPERDAL) 1 mg tablet Take 2 mg by mouth daily (with dinner). Yes Historical Provider   omeprazole (PRILOSEC) 40 mg capsule TAKE ONE CAPSULE BY MOUTH ONCE DAILY FOR  ACID  REFLUX 2/13/17  Yes Chaz Marquez MD   lisinopril (PRINIVIL, ZESTRIL) 5 mg tablet Take 1 Tab by mouth daily. 12/2/16  Yes Chaz Marquez MD   meclizine (ANTIVERT) 12.5 mg tablet Take 1 Tab by mouth three (3) times daily as needed. 12/2/16  Yes Chaz Marquez MD   traZODone (DESYREL) 50 mg tablet TAKE 1 TABLET BY MOUTH NIGHTLY FOR SLEEP 11/25/16  Yes Chaz Marquez MD   sertraline (ZOLOFT) 100 mg tablet Take 1.5 Tabs by mouth daily. 6/22/16  Yes Chaz Marquez MD   simvastatin (ZOCOR) 40 mg tablet TAKE 1 TABLET BY MOUTH NIGHTLY 4/26/16  Yes Chaz Marquez MD   latanoprost (XALATAN) 0.005 % ophthalmic solution Administer 1 Drop to right eye nightly. Yes Historical Provider   aspirin delayed-release 81 mg tablet Take 1 Tab by mouth daily. 5/27/15  Yes Chaz Marquez MD   naproxen sodium (ALEVE) 220 mg tablet Take 220 mg by mouth two (2) times daily as needed for Pain. Give with food 9/30/10  Yes Historical Provider   cholecalciferol, vitamin d3, (VITAMIN D) 1,000 unit tablet Take 1,000 Units by mouth daily.  Indications: VITAMIN D DEFICIENCY   Yes Historical Provider       Allergies  Allergies   Allergen Reactions    Hydrocodone-Acetaminophen Rash     Morbiliform exanthem rash    Tolerates tramadol    Morphine Rash    Erythromycin Nausea and Vomiting    Sulfa (Sulfonamide Antibiotics) Rash    Adhesive Tape-Silicones Hives    Cinnamon Nausea Only    Codeine Other (comments)     Hallucinations  Tolerates tramadol    Gabapentin Rash     Tolerates pregabalin    Shellfish Derived Nausea and Vomiting       Past Medical History:   Diagnosis Date    Anxiety     DDD (degenerative disc disease), cervical     DDD (degenerative disc disease), lumbar     Dementia     Diabetes ([address]     Falls     Fatigue     GERD (gastroesophageal reflux disease)     Glaucoma     Glaucoma     right eye    Hearing loss     Horseshoe kidney     Hypertension     Irritable bowel syndrome     Neuropathy     Osteoarthrosis, unspecified whether generalized or localized, other specified sites     Pressure sore on heel     Pure hypercholesterolemia     Ringing in ears     Severe single current episode of major depressive disorder, with psychotic features ([address] 6/22/2016    Snoring     Spinal stenosis, unspecified region other than cervical     Vertigo      Past Surgical History:   Procedure Laterality Date    AMB POC EEG / SLEEP DEPRIVED      APPENDECTOMY      HX CHOLECYSTECTOMY  3/10    HX HEENT      HX HERNIA REPAIR  left inguinal    HX ORTHOPAEDIC      lumbar decompression    HX SPLENECTOMY  1963    HX SPLENECTOMY      LAMINECTOMY,LUMBAR  1992       Family History   Problem Relation Age of Onset    Stroke Father     Diabetes Mother     Hypertension Mother     Heart Disease Mother     Cancer Mother     Dementia Mother     Stroke Mother     Dementia Sister     Stroke Sister     Dementia Other        Social History  Social History     Social History    Marital status:      Spouse name: N/A    Number of children: N/A    Years of education: N/A     Occupational History    Not on file. Social History Main Topics    Smoking status: Never Smoker    Smokeless tobacco: Never Used    Alcohol use No    Drug use: No    Sexual activity: Yes     Partners: Female     Birth control/ protection: None     Other Topics Concern    Not on file     Social History Narrative       Alcohol history: Not at all  Smoking history: Non-smoker  Illicit drug history: Not at all    Living arrangement: patient lives with their spouse. Home health RN/OT/SLP come to the house. Ambulates: patient no longer ambulates. Review of Systems  Review of Systems   Unable to perform ROS: Dementia       Physical Exam  Objective:  General Appearance:  Ill-appearing, in no acute distress and not in pain (Patient answers questions appropriately. Pleasant. Many family members at bedside. ). Vital signs: (most recent): Blood pressure 107/53, pulse 65, temperature 98.6 °F (37 °C), resp. rate 22, height 5' 5\" (1.651 m), weight 183 lb 10.3 oz (83.3 kg), SpO2 (!) 89 %. HEENT: (Dry mucous membranes. Notable candidiasis in mouth.)    Lungs:  Normal respiratory rate and normal effort. He is not in respiratory distress. No stridor. There are rales (rales in the right base). No wheezes, rhonchi or decreased breath sounds. Heart: Normal rate. Regular rhythm. S1 normal and S2 normal.  No murmur, gallop or friction rub. Extremities: Normal range of motion. There is no dependent edema.  (2 ulcers located on the lateral aspect of the right foot. Appear to be healing.)  Neurological: Patient is alert. Normal strength. (Patient is not oriented. Neurological exam is non focal.  Strength is equal bilaterally. ). Skin:  Warm and dry. Abdomen: Abdomen is soft and non-distended.   Bowel sounds are normal.   There is no abdominal tenderness. There is no rebound tenderness. There is no guarding. There is no mass. There is no splenomegaly. Pupils:  Pupils are equal, round, and reactive to light. Pulses: Distal pulses are intact. O2 Device: Room air     Laboratory Data  Recent Results (from the past 8 hour(s))   POC INR    Collection Time: 04/08/17  2:32 PM   Result Value Ref Range    INR (POC) 1.2 (H) <1.2     EKG, 12 LEAD, INITIAL    Collection Time: 04/08/17  2:50 PM   Result Value Ref Range    Ventricular Rate 64 BPM    Atrial Rate 64 BPM    P-R Interval 198 ms    QRS Duration 102 ms    Q-T Interval 412 ms    QTC Calculation (Bezet) 425 ms    Calculated P Axis -12 degrees    Calculated R Axis -65 degrees    Calculated T Axis 49 degrees    Diagnosis       Normal sinus rhythm  Left anterior fascicular block  Possible Lateral infarct , age undetermined  Abnormal ECG  No previous ECGs available     CBC WITH AUTOMATED DIFF    Collection Time: 04/08/17  2:53 PM   Result Value Ref Range    WBC 16.2 (H) 4.1 - 11.1 K/uL    RBC 4.23 4. 10 - 5.70 M/uL    HGB 12.1 12.1 - 17.0 g/dL    HCT 38.6 36.6 - 50.3 %    MCV 91.3 80.0 - 99.0 FL    MCH 28.6 26.0 - 34.0 PG    MCHC 31.3 30.0 - 36.5 g/dL    RDW 14.4 11.5 - 14.5 %    PLATELET 694 144 - 010 K/uL    NEUTROPHILS 88 (H) 32 - 75 %    LYMPHOCYTES 7 (L) 12 - 49 %    MONOCYTES 5 5 - 13 %    EOSINOPHILS 0 0 - 7 %    BASOPHILS 0 0 - 1 %    ABS. NEUTROPHILS 14.3 (H) 1.8 - 8.0 K/UL    ABS. LYMPHOCYTES 1.1 0.8 - 3.5 K/UL    ABS. MONOCYTES 0.7 0.0 - 1.0 K/UL    ABS. EOSINOPHILS 0.0 0.0 - 0.4 K/UL    ABS.  BASOPHILS 0.0 0.0 - 0.1 K/UL   PROTHROMBIN TIME + INR    Collection Time: 04/08/17  2:53 PM   Result Value Ref Range    INR 1.1 0.9 - 1.1      Prothrombin time 10.9 9.0 - 11.1 sec   TROPONIN I    Collection Time: 04/08/17  2:53 PM   Result Value Ref Range    Troponin-I, Qt. <0.04 <0.05 ng/mL   URINALYSIS W/ REFLEX CULTURE    Collection Time: 04/08/17  2:53 PM Result Value Ref Range    Color YELLOW/STRAW      Appearance CLEAR CLEAR      Specific gravity 1.018 1.003 - 1.030      pH (UA) 7.0 5.0 - 8.0      Protein NEGATIVE  NEG mg/dL    Glucose NEGATIVE  NEG mg/dL    Ketone NEGATIVE  NEG mg/dL    Bilirubin NEGATIVE  NEG      Blood NEGATIVE  NEG      Urobilinogen 0.2 0.2 - 1.0 EU/dL    Nitrites NEGATIVE  NEG      Leukocyte Esterase NEGATIVE  NEG      WBC 0-4 0 - 4 /hpf    RBC 0-5 0 - 5 /hpf    Epithelial cells FEW FEW /lpf    Bacteria NEGATIVE  NEG /hpf    UA:UC IF INDICATED CULTURE NOT INDICATED BY UA RESULT CNI     MAGNESIUM    Collection Time: 04/08/17  2:53 PM   Result Value Ref Range    Magnesium 2.0 1.6 - 2.4 mg/dL   METABOLIC PANEL, COMPREHENSIVE    Collection Time: 04/08/17  2:53 PM   Result Value Ref Range    Sodium 138 136 - 145 mmol/L    Potassium 5.4 (H) 3.5 - 5.1 mmol/L    Chloride 103 97 - 108 mmol/L    CO2 26 21 - 32 mmol/L    Anion gap 9 5 - 15 mmol/L    Glucose 132 (H) 65 - 100 mg/dL    BUN 28 (H) 6 - 20 MG/DL    Creatinine 1.24 0.70 - 1.30 MG/DL    BUN/Creatinine ratio 23 (H) 12 - 20      GFR est AA >60 >60 ml/min/1.73m2    GFR est non-AA 57 (L) >60 ml/min/1.73m2    Calcium 8.1 (L) 8.5 - 10.1 MG/DL    Bilirubin, total 0.2 0.2 - 1.0 MG/DL    ALT (SGPT) 57 12 - 78 U/L    AST (SGOT) 35 15 - 37 U/L    Alk. phosphatase 145 (H) 45 - 117 U/L    Protein, total 6.3 (L) 6.4 - 8.2 g/dL    Albumin 2.4 (L) 3.5 - 5.0 g/dL    Globulin 3.9 2.0 - 4.0 g/dL    A-G Ratio 0.6 (L) 1.1 - 2.2     GLUCOSE, POC    Collection Time: 04/08/17  3:04 PM   Result Value Ref Range    Glucose (POC) 112 (H) 65 - 100 mg/dL    Performed by Vicenta Fraser    LACTIC ACID, PLASMA    Collection Time: 04/08/17  5:04 PM   Result Value Ref Range    Lactic acid 1.2 0.4 - 2.0 MMOL/L       Imaging  CXR Results  (Last 48 hours)               04/08/17 1547  XR CHEST PORT Final result    Impression:  IMPRESSION:       Minimal right basilar opacity favoring atelectasis.  No other significant lung abnormality            Narrative:  history: Acute syncope       COMPARISON: 2/17/2017       FINDINGS:       Frontal chest radiograph submitted for review. The heart is not enlarged. Minimal right basilar opacity favoring atelectasis. No other significant lung abnormality. No significant effusion. No pneumothorax. CT Results  (Last 48 hours)               04/08/17 1428  CT CODE NEURO HEAD WO CONTRAST Final result    Impression:  IMPRESSION:       No evidence for acute intracranial abnormality                   Narrative:  INDICATION:   Near syncopal episode with acute facial droop        EXAM:  HEAD CT WITHOUT CONTRAST       COMPARISON:  None       TECHNIQUE:  Routine noncontrast axial head CT was performed. Coronal and   sagittal multiplanar reconstructions were obtained. CT dose reduction was   achieved through use of a standardized protocol tailored for this examination   and automatic exposure control for dose modulation. FINDINGS:       The ventricles and cortical sulci are within normal limits. No evidence for acute intracranial hemorrhage, midline shift, or mass effect. Mild bilateral subcortical and periventricular areas of patchy low attenuation   is nonspecific but likely related to changes of chronic small vessel ischemic   disease. Vague high density in bilateral cerebellar hemispheres favors   dystrophic calcification       The basal cisterns are patent. The visualized paranasal sinuses and mastoid air cells are clear. No calvarial abnormality. Assessment and Plan     Steffany Warren is a 66 y.o. male with known T2DM with peripheral neuropathy, HLD, spinal stenosis, Alzheimer's Dementia, GERD, HTN, Anxiety/Major depression who is admitted for aspiration pneumonia and stroke/TIA rule out. Aspiration Pneumonia d/t Worsening Dysphagia - patient's family reporting increased secretions and inability to tolerate diet. Also with cough.   Do not suspect a septic picture here as patient only 1/4 SIRS (leukocytosis). Is evaluated by home health speech therapy regularly for worsening dysphagia--on pureed diet at home. CXR showing minimal opacity in the right lower lobe. Auscultation notable for crackles in the right base. - Admitting patient to remote telemetry, inpatient status. - Changed abx to unasyn, 3g Q6H.  - Obtain repeat portable CXR tomorrow AM.  IVF may help to visualize a pneumonia better. - Placing patient on pureed diet for now--consult to SLP.  - Will continue to monitor vital signs per unit protocol. Concern for Stroke/TIA - patient with episode of unresponsiveness earlier today. Noted to be hypotensive during. Right sided weakness and facial droop noted at the time--now resolved. BP improved with IVF. Tele-neurology evaluated patient--recommended inpatient stroke work up. CT head in the ED was negative. Possibly vasovagal episode or orthostatic hypotension. Resolved right sided weakness could be 2/2 cerebral ischemia.  - Consult placed to neurology, appreciate recs. - MRI brain WO contrast.  - CTA head/neck  - TTE  - Labs:  PTT, PT/INR, treponema, HIV, A1c, lipid panel. Daily CBC & CMP (+Mg, Phos). Hypotension - possibly related to infection. Could also be related to dehydration. Responded nicely to IVF. -Holding home antihypertensives. -Continue to monitor BPs and MAP. Leukocytosis - WBC elevated on admission. Likely 2/2 to infectious process. Most likely related to aspiration pneumonia picture. -Treating pneumonia as above. Alzheimer's Dementia - followed by Dr. Blaine Yoder as outpatient. Patient with overwhelming symptom burden. Family is interested in comfort care measures at this point. Mentioned wanting more information on home hospice options.  -Consult placed to palliative medicine (for assistance with further care decisions), appreciate their recs.   -Continue home medications:  aricept, risperdal, zoloft, ativan. Hypertension  - Patient initially hypotensive on admission. BP resolved following fluid administration.  - Holding home BP medications for now as patient hypotensive. Can add back if BP begins to increase. Will consider adding back atenolol first.    Diabetes Mellitus T2 with Peripheral Neuropathy  - Last HgA1c 6.3 (12/2016). Obtaining a repeat HbA1c with morning labs. - Patient does not take home medications or insulin.  - Ordered SSI with AC & HS accuchecks. - Lyrica    Diabetic Foot Ulcers - 2 ulcers noted on right foot. Do not appear infected. Healing. Wound care services from home health tend to these regularly.  -Wound care consulted. Severe Protein Calorie Malnutrition - Family reports difficulty with feeding recenlty 2/2 dysphagia. SLP helping at home, but intake is still poor. Albumin 2.4 on admission today.  -Consulting nutritionist.  -Consulting SLP. -Added pureed diet (SLP free to change as see fit). -Daily labs:  CMP w/ Phos. Oral Candidiasis - noted on physical exam.  Able to scrape with tongue depressor.  -Oral nystatin, swish and swallow. Spinal Stenosis w/ Chronic Back Pain - patient on tramadol at home.  -Tylenol ordered.  -Holding tramadol for now as BP is on the lower end. Can add back when patient in pain and pressure improves. GERD - stable. -Protonix daily. Major Depressive Disorder - stable. -Zoloft. Obesity  - PT with BMI of Body mass index is 30.56 kg/(m^2). - Encouraging lifestyle modifications and further follow up outpatient. FEN/GI - pureed diet. NS at 100 mL/hr. Activity - Bedrest, frequent turning. (Turn and position ordered)  DVT prophylaxis - Lovenox  GI prophylaxis - Protonix  Fall prophylaxis - Fall precautions ordered. Disposition - Admit to Remote Telemetry. Plan to d/c to TBD. Consulting PT/OT/CM/SLP/wound care/nutrition. Code Status - DNR, discussed with patient / caregivers.   Next of Kin:  Colette Cobb (wife) [phone]. Patient Juan Zambrano will be discussed Dr. Stephanie Omer.     6:51 PM, 04/08/17  Ly Black MD  Family Medicine Resident       For Billing    Chief Complaint   Patient presents with   [facility] Problems  Date Reviewed: 2/24/2017          Codes Class Noted POA    Aspiration pneumonia [facility]) ICD-10-CM: J69.0  ICD-9-CM: 507.0  4/8/2017 Unknown        Right sided weakness ICD-10-CM: R53.1  ICD-9-CM: 728.87  4/8/2017 Unknown

## 2017-04-08 NOTE — PROGRESS NOTES
[facility]: MED RECONCILIATION    Comments/Recommendations:    Patient takes pregabalin at 08:00 and 17:00   Per wife, patient has a very hard time swallowing niacin and his lutein eye vitamin, so she plans to stop these two medications    Medications added:   · Tramadol  · Lutein eye vitamin  · multivitamin    Medications removed:  · Triamcinolone cream    Medications adjusted:  · Atenolol to 25 mg  · Naproxen to as needed for pain  · Pregabalin to twice daily    Information obtained from: Patient's wife    Significant PMH/Disease States:   Past Medical History:   Diagnosis Date    Anxiety     DDD (degenerative disc disease), cervical     DDD (degenerative disc disease), lumbar     Dementia     Diabetes ([address]     Falls     Fatigue     GERD (gastroesophageal reflux disease)     Glaucoma     Glaucoma     right eye    Hearing loss     Horseshoe kidney     Hypertension     Irritable bowel syndrome     Neuropathy     Osteoarthrosis, unspecified whether generalized or localized, other specified sites     Pressure sore on heel     Pure hypercholesterolemia     Ringing in ears     Severe single current episode of major depressive disorder, with psychotic features ([facility] [address] 6/22/2016    Snoring     Spinal stenosis, unspecified region other than cervical     Vertigo      Chief Complaint for this Admission:   Chief Complaint   Patient presents with    Syncope     Allergies: Hydrocodone-acetaminophen; Morphine; Erythromycin; Sulfa (sulfonamide antibiotics); Adhesive tape-silicones; Cinnamon; Codeine; Gabapentin; and Shellfish derived    Prior to Admission Medications:     Prior to Admission Medications   Prescriptions Last Dose Informant Patient Reported? Taking? LORazepam (ATIVAN) 0.5 mg tablet 4/7/2017 at PM Significant Other Yes Yes   Sig: Take 0.5 mg by mouth daily (with dinner). aspirin delayed-release 81 mg tablet 4/8/2017 at AM Significant Other Yes Yes   Sig: Take 1 Tab by mouth daily.    atenolol (TENORMIN) 50 mg tablet 4/8/2017 at AM Significant Other Yes Yes   Sig: Take 25 mg by mouth daily. cholecalciferol, vitamin d3, (VITAMIN D) 1,000 unit tablet 4/8/2017 at AM Significant Other Yes Yes   Sig: Take 1,000 Units by mouth daily. Indications: VITAMIN D DEFICIENCY   donepezil (ARICEPT) 10 mg tablet 4/7/2017 at PM Significant Other Yes Yes   Sig: Take 10 mg by mouth daily (with dinner). latanoprost (XALATAN) 0.005 % ophthalmic solution 4/7/2017 at HS Significant Other Yes Yes   Sig: Administer 1 Drop to right eye nightly. lisinopril (PRINIVIL, ZESTRIL) 5 mg tablet 4/8/2017 at AM Significant Other No Yes   Sig: Take 1 Tab by mouth daily. loratadine (CLARITIN) 10 mg tablet 4/8/2017 at AM Significant Other Yes Yes   Sig: Take 10 mg by mouth daily. lutein-zeaxanthin 25-5 mg cap  Significant Other Yes Yes   Sig: Take 1 Cap by mouth daily (with lunch). meclizine (ANTIVERT) 12.5 mg tablet Not recently Significant Other No Yes   Sig: Take 1 Tab by mouth three (3) times daily as needed. naproxen sodium (ALEVE) 220 mg tablet  Significant Other Yes Yes   Sig: Take 220 mg by mouth two (2) times daily as needed for Pain. Give with food   nicotinic acid (NIACIN) 500 mg tablet  Significant Other Yes Yes   Sig: Take 500 mg by mouth daily (with lunch). omeprazole (PRILOSEC) 40 mg capsule 4/8/2017 at AM Significant Other No Yes   Sig: TAKE ONE CAPSULE BY MOUTH ONCE DAILY FOR  ACID  REFLUX   pregabalin (LYRICA) 150 mg capsule 4/8/2017 at AM Significant Other Yes Yes   Sig: Take 150 mg by mouth two (2) times a day. At 08:00 and 17:00   risperiDONE (RISPERDAL) 1 mg tablet 4/8/2017 at AM Significant Other Yes Yes   Sig: Take 1 mg by mouth daily (with breakfast). risperiDONE (RISPERDAL) 1 mg tablet 4/7/2017 at PM Significant Other Yes Yes   Sig: Take 2 mg by mouth daily (with dinner). sertraline (ZOLOFT) 100 mg tablet 4/8/2017 at AM Significant Other No Yes   Sig: Take 1.5 Tabs by mouth daily.    simvastatin (ZOCOR) 40 mg tablet 4/7/2017 at HS Significant Other No Yes   Sig: TAKE 1 TABLET BY MOUTH NIGHTLY   therapeutic multivitamin (THERAGRAN) tablet  Significant Other Yes Yes   Sig: Take 1 Tab by mouth daily. traMADol (ULTRAM) 50 mg tablet 4/8/2017 at 0900 Significant Other Yes Yes   Sig: Take 100 mg by mouth every six (6) hours as needed for Pain.    traZODone (DESYREL) 50 mg tablet 4/7/2017 at HS Significant Other No Yes   Sig: TAKE 1 TABLET BY MOUTH NIGHTLY FOR SLEEP        Thank you,  Mayur Pardo, PharmD, BCPS  [phone]

## 2017-04-08 NOTE — IP AVS SNAPSHOT
Current Discharge Medication List  
  
START taking these medications Dose & Instructions Dispensing Information Comments Morning Noon Evening Bedtime  
 amoxicillin-clavulanate 875-125 mg per tablet Commonly known as:  AUGMENTIN Your last dose was: Your next dose is:    
   
   
 Dose:  1 Tab Take 1 Tab by mouth two (2) times a day for 4 days. Quantity:  8 Tab Refills:  0 CONTINUE these medications which have NOT CHANGED Dose & Instructions Dispensing Information Comments Morning Noon Evening Bedtime ALEVE 220 mg tablet Generic drug:  naproxen sodium Your last dose was: Your next dose is:    
   
   
 Dose:  220 mg Take 220 mg by mouth two (2) times daily as needed for Pain. Give with food Refills:  0  
     
   
   
   
  
 ARICEPT 10 mg tablet Generic drug:  donepezil Your last dose was: Your next dose is:    
   
   
 Dose:  10 mg Take 10 mg by mouth daily (with dinner). Refills:  0  
     
   
   
   
  
 aspirin delayed-release 81 mg tablet Your last dose was: Your next dose is:    
   
   
 Dose:  81 mg Take 1 Tab by mouth daily. Refills:  0  
     
   
   
   
  
 atenolol 50 mg tablet Commonly known as:  TENORMIN Your last dose was: Your next dose is:    
   
   
 Dose:  25 mg Take 25 mg by mouth daily. Refills:  0 CLARITIN 10 mg tablet Generic drug:  loratadine Your last dose was: Your next dose is:    
   
   
 Dose:  10 mg Take 10 mg by mouth daily. Refills:  0  
     
   
   
   
  
 latanoprost 0.005 % ophthalmic solution Commonly known as:  Albesa Lesch Your last dose was: Your next dose is:    
   
   
 Dose:  1 Drop Administer 1 Drop to right eye nightly. Refills:  0  
     
   
   
   
  
 lisinopril 5 mg tablet Commonly known as:  Romie Parr  
   
 Your last dose was: Your next dose is:    
   
   
 Dose:  5 mg Take 1 Tab by mouth daily. Quantity:  30 Tab Refills:  5 LORazepam 0.5 mg tablet Commonly known as:  ATIVAN Your last dose was: Your next dose is:    
   
   
 Dose:  0.5 mg Take 0.5 mg by mouth daily (with dinner). Refills:  0  
     
   
   
   
  
 lutein-zeaxanthin 25-5 mg Cap Your last dose was: Your next dose is:    
   
   
 Dose:  1 Cap Take 1 Cap by mouth daily (with lunch). Refills:  0 LYRICA 150 mg capsule Generic drug:  pregabalin Your last dose was: Your next dose is:    
   
   
 Dose:  150 mg Take 150 mg by mouth two (2) times a day. At 08:00 and 17:00 Refills:  0  
     
   
   
   
  
 meclizine 12.5 mg tablet Commonly known as:  ANTIVERT Your last dose was: Your next dose is:    
   
   
 Dose:  12.5 mg Take 1 Tab by mouth three (3) times daily as needed. Quantity:  90 Tab Refills:  4  
     
   
   
   
  
 nicotinic acid 500 mg tablet Commonly known as:  NIACIN Your last dose was: Your next dose is:    
   
   
 Dose:  500 mg Take 500 mg by mouth daily (with lunch). Refills:  0  
     
   
   
   
  
 omeprazole 40 mg capsule Commonly known as:  PRILOSEC Your last dose was: Your next dose is: TAKE ONE CAPSULE BY MOUTH ONCE DAILY FOR  ACID  REFLUX Quantity:  90 Cap Refills:  3  
     
   
   
   
  
 * RisperDAL 1 mg tablet Generic drug:  risperiDONE Your last dose was: Your next dose is:    
   
   
 Dose:  1 mg Take 1 mg by mouth daily (with breakfast). Refills:  0  
     
   
   
   
  
 * RisperDAL 1 mg tablet Generic drug:  risperiDONE Your last dose was: Your next dose is:    
   
   
 Dose:  2 mg Take 2 mg by mouth daily (with dinner). Refills:  0 sertraline 100 mg tablet Commonly known as:  ZOLOFT Your last dose was: Your next dose is:    
   
   
 Dose:  150 mg Take 1.5 Tabs by mouth daily. Quantity:  45 Tab Refills:  11  
     
   
   
   
  
 simvastatin 40 mg tablet Commonly known as:  ZOCOR Your last dose was: Your next dose is: TAKE 1 TABLET BY MOUTH NIGHTLY Quantity:  90 Tab Refills:  3 therapeutic multivitamin tablet Commonly known as:  [facility] Your last dose was: Your next dose is:    
   
   
 Dose:  1 Tab Take 1 Tab by mouth daily. Refills:  0  
     
   
   
   
  
 traMADol 50 mg tablet Commonly known as:  ULTRAM  
   
Your last dose was: Your next dose is:    
   
   
 Dose:  100 mg Take 100 mg by mouth every six (6) hours as needed for Pain. Refills:  0  
     
   
   
   
  
 traZODone 50 mg tablet Commonly known as:  Deatra Elif Your last dose was: Your next dose is: TAKE 1 TABLET BY MOUTH NIGHTLY FOR SLEEP Quantity:  30 Tab Refills:  5 VITAMIN D3 1,000 unit tablet Generic drug:  cholecalciferol Your last dose was: Your next dose is:    
   
   
 Dose:  1000 Units Take 1,000 Units by mouth daily. Indications: VITAMIN D DEFICIENCY Refills:  0  
     
   
   
   
  
 * Notice: This list has 2 medication(s) that are the same as other medications prescribed for you. Read the directions carefully, and ask your doctor or other care provider to review them with you. Where to Get Your Medications Information on where to get these meds will be given to you by the nurse or doctor. ! Ask your nurse or doctor about these medications  
  amoxicillin-clavulanate 875-125 mg per tablet

## 2017-04-08 NOTE — ED PROVIDER NOTES
HPI Comments: 66 y.o. male with past medical history significant for DM, hypercholesterolemia, spinal stenosis, IBS, glaucoma, osteoarthrosis, GERD, HTN, anxiety, falls, fatigue, hearing loss, vertigo, glaucoma, DDD, neuropathy, dementia and pressure sore on heel who presents from home via EMS with chief complaint of syncope. Per spouse, the pt was sitting in a chair this afternoon when he became unresponsive for 2-3 minutes. The spouse adds that the pt appeared pale in complexion and was mumbling incoherently while slumped over. Upon coming to, she reports noticing an obvious right sided facial droop. Upon checking his BP following the syncopal episode, the spouse states it read 55/36. She notes that the pt's blood pressure has been running low recently. The spouse makes it known, the pt had his dosage of atenolol cut back about one month ago to 25 mg by Dr. Deborah Baldwin. This morning, the spouse reports the pt appeared to have moderate difficulty swallowing when eating which has been ongoing for a few months. She notes the pt does not have any hx of aspirated pneumonia in the past. Upon EMS arrival, they report that the pt had a BP in the 58'Z systolic as well as a blood glucose of 113. They make it known that the pt had intermittent right sided upper extremity weakness which continued en route to the ED. While in the ED, the spouse makes it known that the pt has not had similar symptoms in the past. She denies fever, N/V/D and urinary symptoms. There are no other acute medical concerns at this time. Full history, physical exam, and ROS unable to be obtained due to: dementia. Social hx: Non-smoker, No ETOH use  PCP: Sherice Gardner MD    Note written by Dmitry Albert, as dictated by Mauricio Quinn MD 2:10 PM          The history is provided by the spouse and the EMS personnel.         Past Medical History:   Diagnosis Date    Anxiety     DDD (degenerative disc disease), cervical     DDD (degenerative disc disease), lumbar     Dementia     Diabetes ([facility] [address]     Falls     Fatigue     GERD (gastroesophageal reflux disease)     Glaucoma     Glaucoma     right eye    Hearing loss     Horseshoe kidney     Hypertension     Irritable bowel syndrome     Neuropathy     Osteoarthrosis, unspecified whether generalized or localized, other specified sites     Pressure sore on heel     Pure hypercholesterolemia     Ringing in ears     Severe single current episode of major depressive disorder, with psychotic features ([facility] [address] 6/22/2016    Snoring     Spinal stenosis, unspecified region other than cervical     Vertigo        Past Surgical History:   Procedure Laterality Date    AMB POC EEG / SLEEP DEPRIVED      APPENDECTOMY      HX CHOLECYSTECTOMY  3/10    HX HEENT      HX HERNIA REPAIR  left inguinal    HX ORTHOPAEDIC      lumbar decompression    HX SPLENECTOMY  1963    HX SPLENECTOMY      LAMINECTOMY,LUMBAR  1992         Family History:   Problem Relation Age of Onset    Stroke Father     Diabetes Mother     Hypertension Mother     Heart Disease Mother     Cancer Mother     Dementia Mother     Stroke Mother     Dementia Sister     Stroke Sister     Dementia Other        Social History     Social History    Marital status:      Spouse name: N/A    Number of children: N/A    Years of education: N/A     Occupational History    Not on file. Social History Main Topics    Smoking status: Never Smoker    Smokeless tobacco: Never Used    Alcohol use No    Drug use: No    Sexual activity: Yes     Partners: Female     Birth control/ protection: None     Other Topics Concern    Not on file     Social History Narrative         ALLERGIES: Hydrocodone-acetaminophen; Morphine; Erythromycin; Sulfa (sulfonamide antibiotics);  Adhesive tape-silicones; Cinnamon; Codeine; Gabapentin; and Shellfish derived    Review of Systems   Unable to perform ROS: Dementia       There were no vitals filed for this visit. Physical Exam   Constitutional: He appears well-developed and well-nourished. No distress. HENT:   Head: Normocephalic and atraumatic. Eyes: Conjunctivae are normal. No scleral icterus. Neck: Neck supple. No tracheal deviation present. Cardiovascular: Normal rate, regular rhythm, normal heart sounds and intact distal pulses. Exam reveals no gallop and no friction rub. No murmur heard. Pulmonary/Chest: Effort normal and breath sounds normal. He has no wheezes. He has no rales. Abdominal: Soft. He exhibits no distension. There is no tenderness. There is no rebound and no guarding. Musculoskeletal: He exhibits no edema. Neurological: He is alert. No cranial nerve deficit. Advanced dementia noted. Cranial nerves intact. Normal strength and sensation to all extremities. Skin: Skin is warm and dry. No rash noted. Psychiatric: He has a normal mood and affect. Nursing note and vitals reviewed. Note written by Dmitry Parnell, as dictated by Clarissa Neri MD 2:10 PM    [facility]  ED Course       Procedures    ED EKG interpretation:  Rhythm: normal sinus rhythm; Rate (approx.): 64 bpm; Axis: normal; ST/T wave: normal; LAFB    Note written by Dmitry Parnell, as dictated by Clarissa Neri MD 2:54 PM    Consult note: [facility] resident - will admit. Clarissa Neri MD  5:24 PM    Total critical care time spend exclusive of procedures: 35 min. Clarissa Neri MD  5:24 PM    A/P: presented via EMS as code stroke after having episode of syncope followed by transient RUE weakness and right facial droop - BP noted to be low at home and en route; also persistently low in the ED; WBC - 16; getting 30 cc/kg NS and broad-spectrum AB's to cover for sepsis; no obvious source; admit to [facility].   Clarissa Neri MD  5:26 PM

## 2017-04-08 NOTE — ED NOTES
Bedside and Verbal shift change report given to Mariangel Castro RN (oncoming nurse) by Nicole Barksdale RN (offgoing nurse). Report included the following information SBAR, ED Summary, MAR, Recent Results, Med Rec Status and Cardiac Rhythm NSR.

## 2017-04-08 NOTE — ED NOTES
1 cm x 2 cm black scab to the R outer lower leg. 1 cm x 2 cm black scab to the R outer upper foot. Redness noted to the outer aspect of the R ankle.

## 2017-04-08 NOTE — ED NOTES
TRANSFER - OUT REPORT:    Verbal report given to Abdirahman Morgan RN (name) on [place]  being transferred to room 507 (unit) for routine progression of care       Report consisted of patients Situation, Background, Assessment and   Recommendations(SBAR). Information from the following report(s) SBAR, Kardex, ED Summary, [facility] and Recent Results was reviewed with the receiving nurse. Lines:   Peripheral IV 04/08/17 Left Antecubital (Active)   Site Assessment Clean, dry, & intact 4/8/2017  2:37 PM   Phlebitis Assessment 0 4/8/2017  2:37 PM   Infiltration Assessment 0 4/8/2017  2:37 PM   Dressing Status Clean, dry, & intact 4/8/2017  2:37 PM   Dressing Type Transparent 4/8/2017  2:37 PM       Peripheral IV 04/08/17 Right Antecubital (Active)   Site Assessment Clean, dry, & intact 4/8/2017  2:53 PM   Phlebitis Assessment 0 4/8/2017  2:53 PM   Infiltration Assessment 0 4/8/2017  2:53 PM   Dressing Status Clean, dry, & intact 4/8/2017  2:53 PM   Dressing Type Transparent 4/8/2017  2:53 PM        Opportunity for questions and clarification was provided.       Patient transported with:   Monitor

## 2017-04-09 NOTE — PROGRESS NOTES
Problem: Mobility Impaired (Adult and Pediatric)  Goal: *Therapy Goal (Edit Goal, Insert Text)  Physical Therapy Goals  Initiated 4/9/2017  1. Patient will move from supine to sit and sit to supine , scoot up and down and roll side to side in bed with moderate assistance x 1 within 5 day(s). 2. Patient will transfer from bed to chair and chair to bed with moderate assistance x 1 using the least restrictive device within 5 day(s). 3. Patient will perform sit to stand with moderate assistance x1 within 5 day(s). 4. Patient/family will demonstrate a good understanding of patient re-positioning every 1-2 hours, pressure relief strategies, and ROM for prevention of pressure ulcer and contractures. PHYSICAL THERAPY EVALUATION  Patient: Thurlow Cockayne (39 y.o. male)  Date: 4/9/2017  Primary Diagnosis: Aspiration pneumonia ([facility] [address]  Right sided weakness        Precautions: Fall    pressure/skin (stage 2 buttock) R heel pressure ulcer, L heel \"healed\" ulcer      ASSESSMENT :  Based on the objective data described below, the patient presents with Alzheimer/dementia and has been homebound and more recently bed bound x 1 month. Presented to ED after syncope episode/low BP. Presents today with weakness, decreased trunk control, decreased coordination, sacral wounds, pressure ulcer b/l heels, increased bowel frequency. Patient's main c/o is his inability to walk and states \"my goal is to be able to walk\". Patient's spouse is very active in his care and states she is performing all ADL's/care taking. HHPT/speech/nursing/ and OT were all in place PTA with HHPT recently discharged due to plateau. Spouse very verbal regarding need for more services to assist her at home as she has had significant h/o back pain with multiple surgeries. She also expresses she wants her  to return home with her. Patient today was oriented x 4; able to perform and tolerate bed mobility max/total A x 2; transfers max A x 2.  Once sitting his feet when in dependent position turn red/purple. Spouse indicates Dr. Scarlett Daniels aware/diagnostics performed. Kassandra Pretty He is able to sit EOB with initial mod A to maintain midline due to retropulsion, however with cues and facilitation of abdominals he was able to maintain some static balance ( 10\") with very close supervision. Performed 2 sit to stands up to RW with 20\" each. Unable to become fully upright and remains flexed. Patient unable to take any steps in standing and 3rd person required to move bed. Patient was noted to have BM and nursing assisted with hygiene. Patient returned to supine total A, frequent BM and noted wound areas midline of buttock. Hygiene performed and barrier cream applied to red areas. Emphasize/educated to patient to contact staff for frequent cleaning to avoid infection or new wound areas. Patient very embarrassed with staff having to help him with this issue. PT educated spouse on position changes every 1-2 hours and need for possible Mike lift at home for transfers and a cushion for seating and positioning in the wheelchair. Patient was propped with pillows in R sidelying and prevelaon boots re-applied. PT spent additional time speaking with spouse regarding POC and need for services. Spouse open to hospice type care and PT recommending spouse to speak with both case management and hospice to see what services he may qualify for. Spouse indicating financial strain and will not be able to hire caregivers to assist or purchase any equipment. Patient will benefit from HHPT upon discharge and should patient go home need additional caregivers to assist with patient. Patient will benefit from skilled intervention to address the above impairments.   Patients rehabilitation potential is considered to be Fair  Factors which may influence rehabilitation potential include:   [ ]         None noted  [ ]         Mental ability/status  [X]         Medical condition  [X] Home/family situation and support systems  [ ]         Safety awareness  [ ]         Pain tolerance/management  [ ]         Other:        PLAN :  Recommendations and Planned Interventions:  [X]           Bed Mobility Training             [ ]    Neuromuscular Re-Education  [X]           Transfer Training                   [ ]    Orthotic/Prosthetic Training  [ ]           Gait Training                         [ ]    Modalities  [X]           Therapeutic Exercises           [ ]    Edema Management/Control  [X]           Therapeutic Activities            [X]    Patient and Family Training/Education  [ ]           Other (comment):     Frequency/Duration: Patient will be followed by physical therapy  5 times a week as a trial then may go to 3x/week. Discharge Recommendations: Home Health   Further Equipment Recommendations for Discharge: May need sonny lift, wheelchair cushion.   Has hospital bed, BSC, WC       SUBJECTIVE:   Patient stated My goal is to be able to walk      OBJECTIVE DATA SUMMARY:   HISTORY:    Past Medical History:   Diagnosis Date    Anxiety      DDD (degenerative disc disease), cervical      DDD (degenerative disc disease), lumbar      Dementia      Diabetes ([address]      Falls      Fatigue      GERD (gastroesophageal reflux disease)      Glaucoma      Glaucoma       right eye    Hearing loss      Horseshoe kidney      Hypertension      Irritable bowel syndrome      Neuropathy      Osteoarthrosis, unspecified whether generalized or localized, other specified sites      Pressure sore on heel      Pure hypercholesterolemia      Ringing in ears      Severe single current episode of major depressive disorder, with psychotic features ([address] 6/22/2016    Snoring      Spinal stenosis, unspecified region other than cervical      Vertigo       Past Surgical History:   Procedure Laterality Date    AMB POC EEG / SLEEP DEPRIVED        APPENDECTOMY        HX CHOLECYSTECTOMY   3/10    HX HEENT        HX HERNIA REPAIR   left inguinal    HX ORTHOPAEDIC         lumbar decompression    HX SPLENECTOMY   1963    HX SPLENECTOMY        [place]     Prior Level of Function/Home Situation: Bed bound x 1 month  Personal factors and/or comorbidities impacting plan of care: see above     Home Situation  Home Environment: Private residence  Wheelchair Ramp: Yes  One/Two Story Residence: One story  Living Alone: No  Support Systems: Spouse/Significant Other/Partner, Child(harinder), [employer] / [place] community  Patient Expects to be Discharged to[de-identified] Private residence  Current DME Used/Available at Home: Hospital bed, Wheelchair, Commode, bedside  Tub or Shower Type:  (sponge in bed )     EXAMINATION/PRESENTATION/DECISION MAKING:   Critical Behavior:  Neurologic State: Alert  Orientation Level: Oriented X4  Cognition: Follows commands     Hearing: Auditory  Auditory Impairment: None  Skin:  Wound areas Heel covered. Buttocks red  Edema:   Range Of Motion:  AROM: Generally decreased, functional                       Strength:    Strength:  (knee 3/5; ankle 3/5; hip flex 3-/5, hip ext 3-/5)                    Tone & Sensation:                                  Coordination:     Vision:      Functional Mobility:  Bed Mobility:  Rolling: Moderate assistance;Maximum assistance;Assist x1  Supine to Sit: Maximum assistance;Assist x2  Sit to Supine: Total assistance;Assist x2  Scooting: Maximum assistance;Assist x1  Transfers:  Sit to Stand: Maximum assistance;Assist x2  Stand to Sit: Moderate assistance;Assist x2                       Balance:   Sitting: Impaired;High guard  Sitting - Static: Occassional;Poor (constant support)  Ambulation/Gait Training:                                                                    Stairs:n/a                          Therapeutic Exercises:    Ankle pumps x 10 long arc quads x 10; hip flex; bridge     Functional Measure:  Wallace Balance Test:      Sitting to Standing: 0  Standing Unsupported: 0  Sitting with Back Unsupported: 1  Standing to Sittin  Transfers: 0  Standing Unsupported with Eyes Closed: 0  Standing Unsupported with Feet Together: 0  Reach Forward with Outstretched Arm: 1   Object: 0  Turn to Look Over Shoulders: 0  Turn 360 Degrees: 0  Alternate Foot on Step/Stool: 0  Standing Unsupported One Foot in Front: 0  Stand on One Le  Total: 2             56=Maximum possible score;   0-20=High fall risk  21-40=Moderate fall risk   41-56=Low fall risk      Iglesias Balance Test and G-code impairment scale:  Percentage of Impairment CH     0%    CI     1-19% CJ     20-39% CK     40-59% CL     60-79% CM     80-99% CN      100%   Iglesias   Score 0-56 56 45-55 34-44 23-33 12-22 1-11 0               G codes: In compliance with CMSs Claims Based Outcome Reporting, the following G-code set was chosen for this patient based on their primary functional limitation being treated: The outcome measure chosen to determine the severity of the functional limitation was the IGLESIAS with a score of2/56 which was correlated with the impairment scale.       · Mobility - Walking and Moving Around:               - CURRENT STATUS:    CM - 80%-99% impaired, limited or restricted               - GOAL STATUS:           CM - 80%-99% impaired, limited or restricted               - D/C STATUS:                       ---------------To be determined---------------      Physical Therapy Evaluation Charge Determination   History Examination Presentation Decision-Making   MEDIUM  Complexity : 1-2 comorbidities / personal factors will impact the outcome/ POC  LOW Complexity : 1-2 Standardized tests and measures addressing body structure, function, activity limitation and / or participation in recreation  MEDIUM Complexity : Evolving with changing characteristics  Other outcome measures IGLESIAS  LOW       Based on the above components, the patient evaluation is determined to be of the following complexity level: LOW      Pain:  Pain Scale 1: Numeric (0 - 10)  Pain Intensity 1: 0              Activity Tolerance:   See above  Please refer to the flowsheet for vital signs taken during this treatment. After treatment:   [ ]         Patient left in no apparent distress sitting up in chair  [X]         Patient left in no apparent distress in bed  [X]         Call bell left within reach  [X]         Nursing notified  [X]         Caregiver present  [ ]         Bed alarm activated      COMMUNICATION/EDUCATION:   The patients plan of care was discussed with: Registered Nurse.  [X]         Fall prevention education was provided and the patient/caregiver indicated understanding. [ ]         Patient/family have participated as able in goal setting and plan of care. [X]         Patient/family agree to work toward stated goals and plan of care. [ ]         Patient understands intent and goals of therapy, but is neutral about his/her participation. [ ]         Patient is unable to participate in goal setting and plan of care.      Thank you for this referral.  Miguelina Ayoub, DPT   Time Calculation: 45 mins

## 2017-04-09 NOTE — PROGRESS NOTES
Spiritual Care Assessment/Progress Notes    Milagro Molina [id number]  [id number]    1945  66 y.o.  male    Patient Telephone Number: [phone] (home)   Yarsanism Affiliation: Unknown   Language: English   Extended Emergency Contact Information  Primary Emergency Contact: [address] Phone: [phone]  Relation: Spouse  Secondary Emergency Contact: [address] [facility] Phone: [phone]  Mobile Phone: [phone]  Relation: Daughter   Patient Active Problem List    Diagnosis Date Noted    Aspiration pneumonia ([facility] 04/08/2017    Right sided weakness 04/08/2017    Rash and nonspecific skin eruption 02/17/2017    Dementia without behavioral disturbance 06/22/2016    Severe single current episode of major depressive disorder, with psychotic features ([address] 06/22/2016    Diabetes mellitus type 2 with complications ([facility] [address] [facility] 31/77/9704    Late onset Alzheimer's disease with behavioral disturbance 03/01/2016    Anxiety 05/08/2015    S/P colonoscopy 05/28/2014    Arthritis of neck ([facility] [address] 05/16/2013    Glaucoma 03/29/2011    Positional vertigo 03/29/2011    Tinnitus 03/29/2011    Hearing loss, sensorineural 03/29/2011    Gastritis 09/30/2010    Essential hypertension, benign 02/24/2010     Class: Chronic    Spinal stenosis, unspecified region other than cervical 02/24/2010     Class: Chronic    Horseshoe kidney 02/24/2010     Class: Chronic    Asplenia 02/24/2010     Class: Chronic    Osteoarthrosis, unspecified whether generalized or localized, other specified sites 02/24/2010     Class: Chronic    Controlled type 2 diabetes mellitus with skin complication, without long-term current use of insulin ([facility] 02/24/2010     Class: Chronic    Irritable bowel syndrome 02/24/2010     Class: Chronic    Pure hypercholesterolemia      Class: Chronic        Date: 4/9/2017       Level of Yarsanism/Spiritual Activity:  [x]         Involved in bradley tradition/spiritual practice    []         Not involved in bradley tradition/spiritual practice  []         Spiritually oriented    []         Claims no spiritual orientation    []         seeking spiritual identity  []         Feels alienated from Taoist practice/tradition  []         Feels angry about Taoist practice/tradition  [x]         Spirituality/Taoist tradition is a resource for coping at this time. []         Not able to assess due to medical condition    Services Provided Today:  []         crisis intervention    []         reading Scriptures  [x]         spiritual assessment    []         prayer  [x]         empathic listening/emotional support  []         rites and rituals (cite in comments)  []         life review     []         Taoist support  []         theological development   []         advocacy  []         ethical dialog     []         blessing  []         bereavement support    [x]         support to family  []         anticipatory grief support   []         help with AMD  []         spiritual guidance    []         meditation      Spiritual Care Needs  []         Emotional Support  []         Spiritual/Uatsdin Care  []         Loss/Adjustment  []         Advocacy/Referral                /Ethics  [x]         No needs expressed at               this time  []         Other: (note in               comments)  [address]  []         Follow up visits with               pt/family  []         Provide materials  []         Schedule sacraments  []         Contact Community               Clergy  [x]         Follow up as needed  []         Other: (note in               comments)     Comments: Visited with patient on 5th floor. Pt's wife Lakia Hernandez at bedside. Patient responds when spoken to, but Lakia Hernandez notes that he has difficulty following a conversation with someone he does not know. He lives at home with his wife and she indicates that she will \"never\" allow him to live any where else. She has some assistance from her daughter and son in law who live close by. Her granddaughter is graduating from college in May. She depends upon God for strength and wisdom in caring for her . The couple are members of [employer]. However, due to the pt's ongoing medical issues they are rarely able to attend. Chaplains will follow as needed.    Chaplain Dagoberto, MDiv, MS, [facility]  [phone])

## 2017-04-09 NOTE — PROGRESS NOTES
Vikas scale of 13. Skin assessment done with Rande Marker. Skin wounds documented. [facility] ordered by MD.Repositioning done.

## 2017-04-09 NOTE — CONSULTS
[facility] [address]   [address] [facility], [address]   [address]       Name:  Flo Reid   MR#:  [id number]   :  1945   Account #:  [de-identified]    Date of Consultation:  2017   Date of Adm:  2017       REASON FOR CONSULTATION: Request of hospitalist for his   syncopal type presentation and localizing weakness    HISTORY OF PRESENT ILLNESS: The patient is a pleasant 69-year-  old ,  individual. He lives out near the [facility]. He is followed by Dr. Teetee Tomas in our   neurology clinic at [facility] for dementia. Last seen in February, and there were   not any controversial issues at the time, although he was noted to be   not tolerate of Namenda, and he continues on the Aricept. He is due to   see Dr. Juwan Gale in , according to the wife, at the [facility], but it looks like he is going to be homebound at this point,   given his category of illness and the evolving medical issues. The   patient has background diabetes mellitus type 2 with peripheral   neuropathy, hyperlipidemia, spinal stenosis. His Alzheimer dementia   probably goes back a good 2-3 years, if not further. Gastroesophageal   reflux disease, hypertension, anxiety/major depression. Apparently   yesterday, the patient was being set up in a wheelchair from his home   hospital bed on the morning of admission. After sitting awhile, he   apparently dropped his head, according to family became   unresponsive and was pale and mumbling. Blood pressure at the time   55/36. At this point, there was someone who noticed perhaps right   facial asymmetry and right-sided weakness The home health nurse   was advised and came in and confirmed a blood pressure of the 70s   over the 40s and EMS brought the patient to [place] [address].  He had recent alterations in his blood pressure medicine   because the wife informed Dr. Carlos Manuel Solomon that his blood pressure readings   were getting lower. His BP meds were altered accordingly. She has also noticed that his swallowing capacity   has been significantly compromised and has to be real cautious regarding same. MEDICINES AT HOME INCLUDE: Aricept and the interested reader   can read the other medications that have been at maintenance level   through recent. ALLERGIES:   1. HYDROCODONE. 2. MORPHINE. 3. ERYTHROMYCIN. 4. SULFA ANTIBIOTICS. 5. ADHESIVE TAPES. 6. SILICONE. 7. CINNAMON. 8. CODEINE. 9. GABAPENTIN. 10. SHELLFISH DERIVED. PAST MEDICAL HISTORY: Positive for degenerative disk disease in   the cervical and lumbar spine, fall prone, fatigue, gastroesophageal   reflux disease, glaucoma in the right eye, hearing loss,   horseshoe kidney, irritable bowel syndrome and tinnitus. He has   nighttime snoring and previous bouts of vertigo. PAST SURGICAL HISTORY: He is status post appendectomy,   cholecystectomy, hernia repair, lumbar decompression, splenectomy. FAMILY HISTORY: Positive for stroke, diabetes, hypertension, heart   disease, cancer and there is an incredible number of people on his   mother's side, including his mother, that have dementia as well,   perhaps half a dozen if not more. SOCIAL HISTORY: He is . Never smoker. Alcohol use is none. REVIEW OF SYSTEMS   Unobtainable, unreliable at this point. TESTING SINCE ADMISSION INCLUDES THE FOLLOWING: CBC   with white count 11.8 with diminished hemoglobin and hematocrit and   increased neutrophils. The patient's urinalysis was steady state and   otherwise noncontroversial. His coags: INR 1.2, prothrombin 11.9. Chemistry showed increased BUN, slightly decreased overall calcium   level, total protein, albumin and the albumin to globulin ratio. The   patient's TSH was normal. His CTA of the head and neck show no   significant abnormalities perceived.  The patient's Code Neuro head CT   from yesterday showed no evidence of acute intracranial abnormality. He has bilateral cortical, subcortical periventricular low attenuation,   most likely small vessel disease. Vague high density in the   bilateral cerebellar hemispheres, consistent with   dystrophic calcification. The patient's portable chest film, minimal right   basilar opacity, favoring atelectasis. No other significant lung   abnormalities. The patient's brain MRI from yesterday was negative. The electrocardiogram, 12-lead, normal sinus rhythm, left anterior   fascicular block, possible lateral infarct, age undetermined. PHYSICAL EXAMINATION   GENERAL: Pleasant-appearing, elderly white male lying supine in bed. He is alert by appearance. He is cooperative to semi-cooperative,   depending on the complexity of the task asked of him. He is oriented to   his name, but not any other spheres. He can follow 1, 2-step and   occasional 3-step commands with coaching. VITAL SIGNS: Temperature 97.7, pulse 56, blood pressure 94/53,   respirations 16, pulse oximetry 94% on room air. HEENT: Normocephalic, atraumatic, without bruits. Ears, nose and   throat were clear. NECK: Reasonably supple, no lymphadenopathy, carotid upstroke,   nontender, no bruits. Range of motion complete. CHEST: Clear. No rales or wheeze. CARDIOVASCULAR: Currently regular rate and rhythm without   gallops, murmurs or rubs. Pulses 2+ and full. ABDOMEN: Rounded, nontender. EXTREMITIES: Full range of motion without cyanosis, clubbing,   edema, rash, or birthmarks. No involuntary movement seen. NEUROLOGIC: Cranial nerves 2-12 revealed funduscopic showing   brief glimpse of the optic nerve, is otherwise unrevealing. Pupils equal,   reactive to light. Afferent pupillary defect negative. Lid fissures   symmetric. External appearance normal. Facial animation   symmetrically apparent. Face sensibility questionably okay. Speech is   hypophonic, but articulate and otherwise lucid.    CEREBELLAR TESTING: Finger-nose-finger, toe-to-finger slow but on   target. Motor and sensory nonlocalizing. Reflexes are trace to +1   above the waist, trace to absent knee jerks, absent ankle jerks. Toes   downgoing bilaterally. No clonus, no Magallanes's. Gait, etc, deferred. IMPRESSION: Syncopal type spell with facial and asymmetric motor   response. With the confirmatory blood pressure readings, it sounds like he had a   syncopal or certainly near-syncopal experience. Recent question by family on   the blood pressure medicine that has been subsequently manipulated by family   practice in [place]. At this particular time, I would dare say that   perhaps he does not need to be on any blood pressure medicine at all. Watch hydration. I see orthostatics have been ordered, fall protocol,   neuro checks, PT and OT, speech therapy, swallowing evaluation,   aspiration precautions, nutritional advisory. Nothing in the way of the   exam or the imaging to suggest a captured stroke. I am not sure this   facial asymmetry and other findings were more to the cosmetic end of   things or the way he was positioned at the time. Obviously with his   situation, a transient ischemic attack is possible, but that is as close to   a responsible guess that I can give. Perhaps in the setting of hypotension, a particular small vascular bed  Was transiently compromised and thus a TIA like presentation? At this time, his wife is pursuing   comfort care measures only. Will have home health care with doctor visits, nursing, etc.  I   do not think we have anything else to offer. Certainly, the aspects of   his clinical case follow his advanced dementia and what occurs in that   particular context including hydration,nutrition, mobility, bulbar compromise etc. If there is anything that we need to follow up on,   please feel free to give us a call back.  As mentioned earlier, the   patient's wife does not think he will be available for outpatient followup   either at [place] or even [place] in June and that is certainly   understandable.           Elizabeth Engle MD      [facility]   D:  04/09/2017   06:07   T:  04/09/2017   07:14   Job #:  [id number]

## 2017-04-09 NOTE — PROGRESS NOTES
Bedside and Verbal shift change report given to Marilee Bellamy (oncoming nurse) by Yudi HARRINGTON RN(offgoing nurse). Report included the following information SBAR and Kardex.

## 2017-04-09 NOTE — PROGRESS NOTES
Stroke Education provided to Pt spouse and the following topics were discussed    1. Patients personal risk factors for stroke are HLD    2. Warning signs of Stroke:        * Sudden numbness or weakness of the face, arm or leg, especially on one side of          The body            * Sudden confusion, trouble speaking or understanding        * Sudden trouble seeing in one or both eyes        * Sudden trouble walking, dizziness, loss of balance or coordination        * Sudden severe headache with no known cause      3. Importance of activation Emergency Medical Services ( 9-1-1 ) immediately if experience any warning signs of stroke. 4. Be sure and schedule a follow-up appointment with your primary care doctor or any specialists as instructed. 5. You must take medicine every day to treat your risk factors for stroke. Be sure to take your medicines exactly as your doctor tells you: no more, no less. Know what your medicines are for , what they do. Anti-thrombotics /anticoagulants can help prevent strokes. You are taking the following medicine(s) ASA,LOVENOX SQ    6. Smoking and second-hand smoke greatly increase your risk of stroke, cardiovascular disease and death. Smoking history none    7. Information provided was Stroke Education Binder/Book    8. Documentation of teaching completed in Patient Education Activity and on Care Plan with teaching response noted?   yes

## 2017-04-09 NOTE — PROGRESS NOTES
1400: PT at bedside to work with patient. Patient with bedrest order. Dr. Lelo Lee on floor. OK to work with PT.      [phone]: Attempted to stick patient for D Dimer 3 times. Unsuccessful. Notified Dr. Lelo Lee. Orders to try to get with am labs. Bedside shift change report given to [address] [facility] [address] (oncoming nurse) by Monica Miner RN (offgoing nurse). Report included the following information SBAR, Kardex, Intake/Output, MAR, Recent Results and Cardiac Rhythm NSR.

## 2017-04-09 NOTE — PROGRESS NOTES
[facility] MEDICINE RESIDENCY PROGRAM   Daily Progress Note    Date: 4/9/2017    Assessment/Plan:   Rose De Santiago is a 66 y.o. male with known T2DM with peripheral neuropathy, HLD, spinal stenosis, Alzheimer's Dementia, GERD, HTN, Anxiety/Major depression who is admitted for aspiration pneumonia and stroke/TIA rule out.     Aspiration Pneumonia d/t Worsening Dysphagia - CXR from this morning shows no significant change from yesterday. Pt not able to tolerate dysphagia diet yet per wife. - Day 2 of unasyn, 3g Q6H.  - Aspiration precautions in place   - Pureed diet for now--SLP eval pending.     Concern for Stroke/TIA - Stroke work-up negative so far. - Consult placed to neurology, appreciate recs. - TTE pending  - Labs pending: treponema, HIV, A1c, lipid panel.       Hypotension - This /54. Could also be related to dehydration.   -Holding home antihypertensives. -Continue to monitor BPs and MAP.     Leukocytosis - WBC 16.2-->11.8. Likely 2/2 to infectious process. Most likely related to aspiration pneumonia picture. -Treating pneumonia as above.     Alzheimer's Dementia - followed by Dr. Akin Avila as outpatient. Patient with overwhelming symptom burden. Family is interested in comfort care measures at this point. Mentioned wanting more information on home hospice options.  -Consult placed to palliative medicine (for assistance with further care decisions), appreciate their recs. -Continue home medications: aricept, risperdal, zoloft, ativan.     Hypertension  - Patient initially hypotensive on admission. BP improved following fluid administration.  - Holding home BP medications for now as patient hypotensive. Can add back if BP begins to increase. Will consider adding back atenolol first.     Diabetes Mellitus T2 with Peripheral Neuropathy  - Last HgA1c 6.3 (12/2016). Repeat HbA1c pending  - Patient does not take home medications or insulin.  - Ordered SSI with AC & HS accuchecks.   - Lyrica     Diabetic Foot Ulcers - 2 ulcers noted on right foot. Do not appear infected. Healing. Wound care services from home health tend to these regularly.  -Wound care consulted.     Severe Protein Calorie Malnutrition - Family reports difficulty with feeding recenlty 2/2 dysphagia. SLP helping at home, but intake is still poor. Albumin 2.4 on admission today.  -Consulting nutritionist.  -Consulting SLP. -Pureed diet (SLP free to change as see fit). -Daily labs: CMP w/ Phos.     Oral Candidiasis - noted on physical exam. Able to scrape with tongue depressor.  -Oral nystatin, swish and swallow.     Spinal Stenosis w/ Chronic Back Pain - patient on tramadol at home.  -Tylenol ordered.  -Holding tramadol for now as BP is on the lower end. Can add back when patient in pain and pressure improves.     GERD - stable. -Protonix daily.     Major Depressive Disorder - stable. -Zoloft.     Obesity  - PT with BMI of Body mass index is 30.56 kg/(m^2). - Encouraging lifestyle modifications and further follow up outpatient.      FEN/GI - pureed diet. NS at 100 mL/hr. Activity - Bedrest, frequent turning. (Turn and position ordered)  DVT prophylaxis - Lovenox  GI prophylaxis - Protonix  Fall prophylaxis - Fall precautions ordered. Disposition - Plan to d/c to TBD. Consulting PT/OT/CM/SLP/wound care/nutrition. Code Status - DNR, discussed with patient / caregivers. Next of Kin: Brody Ramirez (wife) [phone].     Patient Lisa Cantor will be discussed Dr. Meet Kwok. Teri Dutta MD  Family Medicine Resident         CC: \"I'm okay\"    Subjective  No acute events overnight. Wife at bedside. Patient denies chills, headaches, chest pain, shortness of breath, palpitations, abdominal pain, nausea and vomiting, and LE edema. Not tolerating dysphagia diet well per wife.        Inpatient Medications  Current Facility-Administered Medications   Medication Dose Route Frequency    sodium chloride (NS) flush 5-10 mL  5-10 mL IntraVENous Q8H    sodium chloride (NS) flush 5-10 mL  5-10 mL IntraVENous PRN    sodium chloride (NS) flush 5-10 mL  5-10 mL IntraVENous PRN    donepezil (ARICEPT) tablet 10 mg  10 mg Oral DAILY WITH DINNER    latanoprost (XALATAN) 0.005 % ophthalmic solution 1 Drop  1 Drop Right Eye QHS    LORazepam (ATIVAN) tablet 0.5 mg  0.5 mg Oral DAILY WITH DINNER    pantoprazole (PROTONIX) tablet 40 mg  40 mg Oral DAILY    pregabalin (LYRICA) capsule 150 mg  150 mg Oral BID    risperiDONE (RisperDAL) tablet 1 mg  1 mg Oral DAILY WITH BREAKFAST    risperiDONE (RisperDAL) tablet 2 mg  2 mg Oral DAILY WITH DINNER    sertraline (ZOLOFT) tablet 150 mg  150 mg Oral DAILY    simvastatin (ZOCOR) tablet 40 mg  40 mg Oral QHS    sodium chloride (NS) flush 5-10 mL  5-10 mL IntraVENous Q8H    sodium chloride (NS) flush 5-10 mL  5-10 mL IntraVENous PRN    enoxaparin (LOVENOX) injection 40 mg  40 mg SubCUTAneous Q24H    acetaminophen (TYLENOL) tablet 650 mg  650 mg Oral Q4H PRN    ampicillin-sulbactam (UNASYN) 3 g in 0.9% sodium chloride (MBP/ADV) 100 mL  3 g IntraVENous Q6H    0.9% sodium chloride infusion  100 mL/hr IntraVENous CONTINUOUS    nystatin (MYCOSTATIN) 100,000 unit/mL oral suspension 500,000 Units  500,000 Units Oral QID    insulin lispro (HUMALOG) injection   SubCUTAneous AC&HS    glucose chewable tablet 16 g  4 Tab Oral PRN    dextrose (D50W) injection syrg 12.5-25 g  12.5-25 g IntraVENous PRN    glucagon (GLUCAGEN) injection 1 mg  1 mg IntraMUSCular PRN         Allergies  Allergies   Allergen Reactions    Hydrocodone-Acetaminophen Rash     Morbiliform exanthem rash    Tolerates tramadol    Morphine Rash    Erythromycin Nausea and Vomiting    Sulfa (Sulfonamide Antibiotics) Rash    Adhesive Tape-Silicones Hives    Cinnamon Nausea Only    Codeine Other (comments)     Hallucinations  Tolerates tramadol    Gabapentin Rash     Tolerates pregabalin    Shellfish Derived Nausea and Vomiting Objective  Vitals:  Patient Vitals for the past 8 hrs:   Temp Pulse Resp BP SpO2   04/09/17 0655 - (!) 52 - - -   04/09/17 0623 98.1 °F (36.7 °C) (!) 53 17 105/54 93 %   04/09/17 0327 97.7 °F (36.5 °C) (!) 56 16 94/53 94 %         I/O:    Intake/Output Summary (Last 24 hours) at 04/09/17 0745  Last data filed at 04/09/17 4996   Gross per 24 hour   Intake              600 ml   Output                0 ml   Net              600 ml     Last shift:       Last 3 shifts:    04/07 1901 - 04/09 0700  In: 600 [I.V.:600]  Out: -     Physical Exam:  General Appearance: Ill-appearing, in no acute distress and not in pain (Patient answers questions appropriately. Pleasant. Many family members at bedside. ). HEENT: (Dry mucous membranes. Notable candidiasis in mouth.)   Lungs: Good air movement, coarse breath sounds bilaterally. No signs of respiratory distress. Heart: Normal rate. Regular rhythm. S1 normal and S2 normal. No murmur, gallop or friction rub. Extremities: Normal range of motion. 2 ulcers located on the lateral aspect of the right foot. Appear to be healing. No cords, edema or tenderness. Neurological: Patient is alert but not oriented. Normal strength. Right sided facial droop. 3/5 strength in right upper and lower extremity. 4/5 strength on left. CN II-XII grossly intact. Skin: Warm and dry. Abdomen: Abdomen is soft and non-distended. Bowel sounds are normal. There is no abdominal tenderness. There is no rebound tenderness. There is no guarding. There is no mass. There is no splenomegaly. Pupils: Pupils are equal, round, and reactive to light. Pulses: Distal pulses are intact.      Laboratory Data  Recent Results (from the past 12 hour(s))   GLUCOSE, POC    Collection Time: 04/08/17  8:18 PM   Result Value Ref Range    Glucose (POC) 113 (H) 65 - 100 mg/dL    Performed by Ramon De La Fuente    MAGNESIUM    Collection Time: 04/09/17  3:39 AM   Result Value Ref Range    Magnesium 1.8 1.6 - 2.4 mg/dL TROPONIN I    Collection Time: 04/09/17  3:39 AM   Result Value Ref Range    Troponin-I, Qt. <0.04 <0.05 ng/mL   CK W/ REFLX CKMB    Collection Time: 04/09/17  3:39 AM   Result Value Ref Range    CK 41 39 - 243 U/L   METABOLIC PANEL, COMPREHENSIVE    Collection Time: 04/09/17  3:39 AM   Result Value Ref Range    Sodium 140 136 - 145 mmol/L    Potassium 4.3 3.5 - 5.1 mmol/L    Chloride 107 97 - 108 mmol/L    CO2 22 21 - 32 mmol/L    Anion gap 11 5 - 15 mmol/L    Glucose 99 65 - 100 mg/dL    BUN 23 (H) 6 - 20 MG/DL    Creatinine 0.92 0.70 - 1.30 MG/DL    BUN/Creatinine ratio 25 (H) 12 - 20      GFR est AA >60 >60 ml/min/1.73m2    GFR est non-AA >60 >60 ml/min/1.73m2    Calcium 7.9 (L) 8.5 - 10.1 MG/DL    Bilirubin, total 0.3 0.2 - 1.0 MG/DL    ALT (SGPT) 41 12 - 78 U/L    AST (SGOT) 22 15 - 37 U/L    Alk. phosphatase 106 45 - 117 U/L    Protein, total 5.4 (L) 6.4 - 8.2 g/dL    Albumin 2.0 (L) 3.5 - 5.0 g/dL    Globulin 3.4 2.0 - 4.0 g/dL    A-G Ratio 0.6 (L) 1.1 - 2.2     CBC WITH AUTOMATED DIFF    Collection Time: 04/09/17  3:39 AM   Result Value Ref Range    WBC 11.8 (H) 4.1 - 11.1 K/uL    RBC 3.58 (L) 4.10 - 5.70 M/uL    HGB 10.6 (L) 12.1 - 17.0 g/dL    HCT 31.8 (L) 36.6 - 50.3 %    MCV 88.8 80.0 - 99.0 FL    MCH 29.6 26.0 - 34.0 PG    MCHC 33.3 30.0 - 36.5 g/dL    RDW 14.3 11.5 - 14.5 %    PLATELET 556 073 - 923 K/uL    NEUTROPHILS 69 32 - 75 %    LYMPHOCYTES 20 12 - 49 %    MONOCYTES 9 5 - 13 %    EOSINOPHILS 2 0 - 7 %    BASOPHILS 0 0 - 1 %    ABS. NEUTROPHILS 8.1 (H) 1.8 - 8.0 K/UL    ABS. LYMPHOCYTES 2.4 0.8 - 3.5 K/UL    ABS. MONOCYTES 1.1 (H) 0.0 - 1.0 K/UL    ABS. EOSINOPHILS 0.2 0.0 - 0.4 K/UL    ABS.  BASOPHILS 0.0 0.0 - 0.1 K/UL   PHOSPHORUS    Collection Time: 04/09/17  3:39 AM   Result Value Ref Range    Phosphorus 3.5 2.6 - 4.7 MG/DL   MAGNESIUM    Collection Time: 04/09/17  3:39 AM   Result Value Ref Range    Magnesium 1.9 1.6 - 2.4 mg/dL   PTT    Collection Time: 04/09/17  3:39 AM   Result Value Ref Range    aPTT 31.3 22.1 - 32.5 sec    aPTT, therapeutic range     58.0 - 77.0 SECS   PROTHROMBIN TIME + INR    Collection Time: 04/09/17  3:39 AM   Result Value Ref Range    INR 1.2 (H) 0.9 - 1.1      Prothrombin time 11.9 (H) 9.0 - 11.1 sec   TSH 3RD GENERATION    Collection Time: 04/09/17  3:39 AM   Result Value Ref Range    TSH 1.50 0.36 - 3.74 uIU/mL   GLUCOSE, POC    Collection Time: 04/09/17  7:35 AM   Result Value Ref Range    Glucose (POC) 113 (H) 65 - 100 mg/dL    Performed by Serenity Dixon (PCT)          Imaging  CXR: no acute change from yesterday (mild right basilar opacity)      Hospital Problems:  Hospital Problems  Date Reviewed: 2/24/2017          Codes Class Noted POA    [facility] ICD-10-CM: J69.0  ICD-9-CM: 507.0  4/8/2017 Unknown        Right sided weakness ICD-10-CM: R53.1  ICD-9-CM: 728.87  4/8/2017 Unknown

## 2017-04-09 NOTE — PROGRESS NOTES
[facility] FAMILY MEDICINE RESIDENCY PROGRAM   Daily Progress Note    Date: 4/9/2017    Assessment/Plan:   Rachel Og is a 66 y.o. male with known T2DM with peripheral neuropathy, HLD, spinal stenosis, Alzheimer's Dementia, GERD, HTN, Anxiety/Major depression who is admitted for aspiration pneumonia and stroke/TIA rule out. Overnight Events: No acute events.      Aspiration Pneumonia d/t Worsening Dysphagia - Pt appears stable this AM on room air. Blood cultures NGX2  - Unasyn, 3g Q6H.  - Aspiration precautions in place   - Pureed diet for now--SLP eval pending.     Concern for Stroke/TIA - Stroke work-up negative so far. A1C 6.2. ECHO 60-65% EF. Lipid panel WNL. Neurology evaluated-possible TIA vs near-syncopal episode. - Labs pending: treponema, HIV   - F/U further neurology recommendations     Alzheimer's Dementia - followed by Dr. Markie Gusman as outpatient. Patient with overwhelming symptom burden. Family is interested in comfort care measures at this point. Mentioned wanting more information on home hospice options.  -Consult placed to palliative medicine (for assistance with further care decisions), appreciate their recs. -Continue home medications: aricept, risperdal, zoloft, ativan.      Hypotension - This AM 99/54. MAP: 70. Could also be related to dehydration.   -Holding home antihypertensives. -Continue to monitor BPs and MAP.     Leukocytosis - WBC 16.2-->11.8-->9.0. Improving. Most likely related to aspiration pneumonia picture. -Treating pneumonia as above.     Hypertension: Patient initially hypotensive on admission. BP improved following fluid administration.  - Holding home BP medications for now as patient hypotensive. Can add back if BP begins to increase. Will consider adding back atenolol first.     Diabetes Mellitus T2 with Peripheral Neuropathy: Patient does not take home medications or insulin. A1C 6.2.  - Ordered SSI with AC & HS accuchecks.   - Lyrica     Diabetic Foot Ulcers - 2 ulcers noted on right foot. Do not appear infected. Healing. Wound care services from home health tend to these regularly.  -Wound care consulted.     Severe Protein Calorie Malnutrition - Family reports difficulty with feeding recenlty 2/2 dysphagia. SLP helping at home, but intake is still poor. Albumin 2.4 on admission today.  -Consulting nutritionist.  -Consulting SLP. -Pureed diet (SLP free to change as see fit). -Daily labs: CMP w/ Phos.     Oral Candidiasis - noted on physical exam. Able to scrape with tongue depressor.  -Oral nystatin, swish and swallow.     Spinal Stenosis w/ Chronic Back Pain - patient on tramadol at home.  -Tylenol.  -Holding tramadol for now as BP is on the lower end. Can add back when patient in pain and pressure improves.     GERD - stable. -Protonix daily.     Major Depressive Disorder - stable. -Zoloft.     Obesity  - PT with BMI of Body mass index is 30.56 kg/(m^2). - Encouraging lifestyle modifications and further follow up outpatient.      FEN/GI - pureed diet. NS at 100 mL/hr. Activity - Bedrest, frequent turning. (Turn and position ordered)  DVT prophylaxis - Lovenox  GI prophylaxis - Protonix  Fall prophylaxis - Fall precautions ordered. Disposition - Plan to d/c to TBD. Consulting PT/OT/CM/SLP/wound care/nutrition. Code Status - DNR, discussed with patient / caregivers. Next of Kin: Russell Duque (wife) [phone].     Patient Willma Ej will be discussed Dr. Cynthia Nguyen MD  [facility] Medicine Resident         Subjective  No acute events overnight. Pt asleep. Wife at bedside. Wife provided most of history and stated that he was very confused yesterday evening and required melatonin to get to sleep.        Inpatient Medications  Current Facility-Administered Medications   Medication Dose Route Frequency    sodium chloride (NS) flush 5-10 mL  5-10 mL IntraVENous Q8H    sodium chloride (NS) flush 5-10 mL  5-10 mL IntraVENous PRN    sodium chloride (NS) flush 5-10 mL  5-10 mL IntraVENous PRN    donepezil (ARICEPT) tablet 10 mg  10 mg Oral DAILY WITH DINNER    latanoprost (XALATAN) 0.005 % ophthalmic solution 1 Drop  1 Drop Right Eye QHS    LORazepam (ATIVAN) tablet 0.5 mg  0.5 mg Oral DAILY WITH DINNER    pantoprazole (PROTONIX) tablet 40 mg  40 mg Oral DAILY    pregabalin (LYRICA) capsule 150 mg  150 mg Oral BID    risperiDONE (RisperDAL) tablet 1 mg  1 mg Oral DAILY WITH BREAKFAST    risperiDONE (RisperDAL) tablet 2 mg  2 mg Oral DAILY WITH DINNER    sertraline (ZOLOFT) tablet 150 mg  150 mg Oral DAILY    simvastatin (ZOCOR) tablet 40 mg  40 mg Oral QHS    sodium chloride (NS) flush 5-10 mL  5-10 mL IntraVENous Q8H    sodium chloride (NS) flush 5-10 mL  5-10 mL IntraVENous PRN    enoxaparin (LOVENOX) injection 40 mg  40 mg SubCUTAneous Q24H    acetaminophen (TYLENOL) tablet 650 mg  650 mg Oral Q4H PRN    ampicillin-sulbactam (UNASYN) 3 g in 0.9% sodium chloride (MBP/ADV) 100 mL  3 g IntraVENous Q6H    0.9% sodium chloride infusion  100 mL/hr IntraVENous CONTINUOUS    nystatin (MYCOSTATIN) 100,000 unit/mL oral suspension 500,000 Units  500,000 Units Oral QID    insulin lispro (HUMALOG) injection   SubCUTAneous AC&HS    glucose chewable tablet 16 g  4 Tab Oral PRN    dextrose (D50W) injection syrg 12.5-25 g  12.5-25 g IntraVENous PRN    glucagon (GLUCAGEN) injection 1 mg  1 mg IntraMUSCular PRN         Allergies  Allergies   Allergen Reactions    Hydrocodone-Acetaminophen Rash     Morbiliform exanthem rash    Tolerates tramadol    Morphine Rash    Erythromycin Nausea and Vomiting    Sulfa (Sulfonamide Antibiotics) Rash    Adhesive Tape-Silicones Hives    Cinnamon Nausea Only    Codeine Other (comments)     Hallucinations  Tolerates tramadol    Gabapentin Rash     Tolerates pregabalin    Shellfish Derived Nausea and Vomiting         Objective  Vitals:  Patient Vitals for the past 8 hrs:   Temp Pulse Resp BP SpO2   04/09/17 1015 97.9 °F (36.6 °C) 64 18 103/55 93 %   04/09/17 0655 - (!) 52 - - -   04/09/17 0623 98.1 °F (36.7 °C) (!) 53 17 105/54 93 %         I/O:    Intake/Output Summary (Last 24 hours) at 04/09/17 1405  Last data filed at 04/09/17 6976   Gross per 24 hour   Intake              600 ml   Output                0 ml   Net              600 ml     Last shift:       Last 3 shifts:    04/07 1901 - 04/09 0700  In: 600 [I.V.:600]  Out: -     Physical Exam:  General Appearance: Ill-appearing, in no acute distress and not in pain (Patient answers questions appropriately. Pleasant. Many family members at bedside. ). HEENT: (Dry mucous membranes. Notable candidiasis in mouth.)   Lungs: Good air movement, coarse breath sounds bilaterally. No signs of respiratory distress. Heart: Normal rate. Regular rhythm. S1 normal and S2 normal. No murmur, gallop or friction rub. Extremities: Normal range of motion. 2 ulcers located on the lateral aspect of the right foot. Appear to be healing. No cords, edema or tenderness. Neurological: Patient is alert but not oriented. Normal strength. Right sided facial droop. 3/5 strength in right upper and lower extremity. 4/5 strength on left. CN II-XII grossly intact. Skin: Warm and dry. Abdomen: Abdomen is soft and non-distended. Bowel sounds are normal. There is no abdominal tenderness. There is no rebound tenderness. There is no guarding. There is no mass. There is no splenomegaly. Pupils: Pupils are equal, round, and reactive to light. Pulses: Distal pulses are intact.      Laboratory Data  Recent Results (from the past 12 hour(s))   MAGNESIUM    Collection Time: 04/09/17  3:39 AM   Result Value Ref Range    Magnesium 1.8 1.6 - 2.4 mg/dL   TROPONIN I    Collection Time: 04/09/17  3:39 AM   Result Value Ref Range    Troponin-I, Qt. <0.04 <0.05 ng/mL   CK W/ REFLX CKMB    Collection Time: 04/09/17  3:39 AM   Result Value Ref Range    CK 41 39 - 599 U/L   METABOLIC PANEL, COMPREHENSIVE    Collection Time: 04/09/17  3:39 AM   Result Value Ref Range    Sodium 140 136 - 145 mmol/L    Potassium 4.3 3.5 - 5.1 mmol/L    Chloride 107 97 - 108 mmol/L    CO2 22 21 - 32 mmol/L    Anion gap 11 5 - 15 mmol/L    Glucose 99 65 - 100 mg/dL    BUN 23 (H) 6 - 20 MG/DL    Creatinine 0.92 0.70 - 1.30 MG/DL    BUN/Creatinine ratio 25 (H) 12 - 20      GFR est AA >60 >60 ml/min/1.73m2    GFR est non-AA >60 >60 ml/min/1.73m2    Calcium 7.9 (L) 8.5 - 10.1 MG/DL    Bilirubin, total 0.3 0.2 - 1.0 MG/DL    ALT (SGPT) 41 12 - 78 U/L    AST (SGOT) 22 15 - 37 U/L    Alk. phosphatase 106 45 - 117 U/L    Protein, total 5.4 (L) 6.4 - 8.2 g/dL    Albumin 2.0 (L) 3.5 - 5.0 g/dL    Globulin 3.4 2.0 - 4.0 g/dL    A-G Ratio 0.6 (L) 1.1 - 2.2     CBC WITH AUTOMATED DIFF    Collection Time: 04/09/17  3:39 AM   Result Value Ref Range    WBC 11.8 (H) 4.1 - 11.1 K/uL    RBC 3.58 (L) 4.10 - 5.70 M/uL    HGB 10.6 (L) 12.1 - 17.0 g/dL    HCT 31.8 (L) 36.6 - 50.3 %    MCV 88.8 80.0 - 99.0 FL    MCH 29.6 26.0 - 34.0 PG    MCHC 33.3 30.0 - 36.5 g/dL    RDW 14.3 11.5 - 14.5 %    PLATELET 235 752 - 787 K/uL    NEUTROPHILS 69 32 - 75 %    LYMPHOCYTES 20 12 - 49 %    MONOCYTES 9 5 - 13 %    EOSINOPHILS 2 0 - 7 %    BASOPHILS 0 0 - 1 %    ABS. NEUTROPHILS 8.1 (H) 1.8 - 8.0 K/UL    ABS. LYMPHOCYTES 2.4 0.8 - 3.5 K/UL    ABS. MONOCYTES 1.1 (H) 0.0 - 1.0 K/UL    ABS. EOSINOPHILS 0.2 0.0 - 0.4 K/UL    ABS.  BASOPHILS 0.0 0.0 - 0.1 K/UL   PHOSPHORUS    Collection Time: 04/09/17  3:39 AM   Result Value Ref Range    Phosphorus 3.5 2.6 - 4.7 MG/DL   MAGNESIUM    Collection Time: 04/09/17  3:39 AM   Result Value Ref Range    Magnesium 1.9 1.6 - 2.4 mg/dL   PTT    Collection Time: 04/09/17  3:39 AM   Result Value Ref Range    aPTT 31.3 22.1 - 32.5 sec    aPTT, therapeutic range     58.0 - 77.0 SECS   PROTHROMBIN TIME + INR    Collection Time: 04/09/17  3:39 AM   Result Value Ref Range    INR 1.2 (H) 0.9 - 1.1      Prothrombin time 11.9 (H) 9.0 - 11.1 sec   HEMOGLOBIN A1C WITH EAG    Collection Time: 04/09/17  3:39 AM   Result Value Ref Range    Hemoglobin A1c 6.2 4.2 - 6.3 %    Est. average glucose 131 mg/dL   LIPID PANEL    Collection Time: 04/09/17  3:39 AM   Result Value Ref Range    LIPID PROFILE          Cholesterol, total 94 <200 MG/DL    Triglyceride 54 <150 MG/DL    HDL Cholesterol 41 MG/DL    LDL, calculated 42.2 0 - 100 MG/DL    VLDL, calculated 10.8 MG/DL    CHOL/HDL Ratio 2.3 0 - 5.0     TSH 3RD GENERATION    Collection Time: 04/09/17  3:39 AM   Result Value Ref Range    TSH 1.50 0.36 - 3.74 uIU/mL   GLUCOSE, POC    Collection Time: 04/09/17  7:35 AM   Result Value Ref Range    Glucose (POC) 113 (H) 65 - 100 mg/dL    Performed by Rajeev Macario (PCT)    GLUCOSE, POC    Collection Time: 04/09/17 11:20 AM   Result Value Ref Range    Glucose (POC) 136 (H) 65 - 100 mg/dL    Performed by Rajeev Macario (PCT)          Imaging  CXR: no acute change from yesterday (mild right basilar opacity)      Hospital Problems:  Hospital Problems  Date Reviewed: 2/24/2017          Codes Class Noted POA    [facility] ICD-10-CM: J69.0  ICD-9-CM: 507.0  4/8/2017 Unknown        Right sided weakness ICD-10-CM: R53.1  ICD-9-CM: 728.87  4/8/2017 Unknown

## 2017-04-10 NOTE — PROGRESS NOTES
Palliative Medicine  [place]: [phone] ([phone])  [facility]: [phone])      Resuscitation Status: DNR   Durable DNR addressed? [] Yes   [x] No    [] Not Applicable *Will plan to address prior to discharge    Advance Care Planning 4/10/2017   Patient's Healthcare Decision Maker is: Named in scanned ACP document   Primary Decision Maker Name Leticia Mitchell   Primary Decision Maker Phone Number [phone]   Primary Decision Maker Relationship to Patient Spouse   Secondary Decision Maker Name Lee Tabor, [phone]   Secondary Decision Maker Relationship to Patient Adult child   Confirm Advance Directive Yes, on file   Patient Would Like to Complete Advance Directive -   Does the patient have other document types Power of            Patient / Family Encounter Documentation    Participants (names): Pt, wife Beto De Los Santos (Dr. Sylwia Richter, [address] [place])    Narrative:  Pt was awake in bed, able to engage in conversation, responses to questions were appropriate/mostly accurate. Pt and wife have been  46 yrs, have one dtr, Zachary Platt, who lives nearby. Wife spoke of pt's decline over time, reports pt had home health services but was not making progress with PT/OT, reports pt has been essentially bed bound, recently joined a home based primary care team as he was not able to get to doctors appts, is now followed by Dr. Jessy Sandhu. Wife spoke of pt's increased difficulty swallowing, reports saliva has been pooling in throat. Wife is not in favor of peg tube but is struggling with the idea that pt would \"starve\" or would choke if fed. Wife is committed to caring for pt at home but would welcome any support that can be offered. Wife had already been contemplating hospice care though prefers not to say the word \"hospice\" in pt's presence.       Psychosocial Issues Identified: Anticipatory grief, caregiver stress    Goals of Care / Plan: Hospice consult placed for home hospice. Will follow for support while pt is inpt, will plan to assist with completion of DDNR prior to discharge. Discussed with Care Manager and SLP. Thank you for including Palliative Medicine in the care of Mr. Daniel Arnold.     Brittney Shipley LCSW, [facility]  [phone])

## 2017-04-10 NOTE — DISCHARGE SUMMARY
[facility] FAMILY MEDICINE RESIDENCY PROGRAM   Discharge/Transfer/Off-Service Note    Date: April 11, 2017    Saul Retana  MRN: [id number]  YOB: 1945  Age: 66 y.o. Date of admission: 4/8/2017     Date of discharge/transfer: 4/11/2017    Primary Care Physician: Hilton Estes MD     Attending physician at admission: Ruslan Bonilla MD     Attending physician at discharge/transfer: Skye Cordova MD     Resident physician at discharge/transfer: Althea Akers MD     Consultants during hospitalization  Palliative Care-Dr. Johanna Luong     Admission diagnoses   Aspiration pneumonia [facility])  Right sided weakness    Discharge diagnoses  Hospital Problems  Date Reviewed: 4/10/2017          Codes Class Noted POA    Aspiration pneumonia [facility]) ICD-10-CM: J69.0  ICD-9-CM: 507.0  4/8/2017 Unknown        Right sided weakness ICD-10-CM: R53.1  ICD-9-CM: 728.87  4/8/2017 Unknown               History of Present Illness  Per Dr. Oksana Silvestre:  Ludwig Tirado is a 66 y.o. male with known T2DM with peripheral neuropathy, HLD, spinal stenosis, Alzheimer's Dementia, GERD, HTN, Anxiety/Major depression who was brought to the ED by EMS (with his family at bedside) following an unresponsive episode at home. The patient cannot give a history himself, most of the history was obtained from the patient's wife and family members present at the bedside. They went to set the patient up in a wheelchair from his home hospital bed this morning. After sitting for a while, the patient dropped his head and became unresponsive. He was still conscious, but the family reports that he was very pale and mumbling. A blood pressure was taken: 55/36. The patient was noted to have a right sided facial droop and right sided weakness. EMS was called at that time, and the patient was brought to [facility].  En route, the patient's blood pressure was noted to be 70s/40s.     In the ER, vital signs were remarkable for BP down to 79/44 (improved to 107/53 with IVF). Labs were remarkable for leukocytosis of 16.2. CXR showed minimal right basilar opacaties. Pt was treated with 30cc/kg IVF, and started on levaquin and cefepime.     During conversation with the family, the patient's wife reports that the family is interested in pursuing home hospice options. \"    Nory Willis is a 66 y.o. male with known T2DM with peripheral neuropathy, HLD, spinal stenosis, Alzheimer's Dementia, GERD, HTN, Anxiety/Major depression who is admitted for aspiration pneumonia and stroke/TIA rule out.      Aspiration Pneumonia d/t Worsening Dysphagia - CXR showed right basilar infiltrate concerning for aspiration PNA. Treated with 3 days of unasyn while inpatient. Blood cultures negative x3d. Pt afebrile and VSS. - Complete treatment with augmentin BID for 4 more days on discharge  - Continue with aspiration precautions at home  - Dysphagia diet      Concern for Stroke/TIA - Stroke work-up negative. ECHO from 4/8: EF 60 % to 65 %, no regional wall motion abnormalities. A1C 6.2. Lipid panel WNL. Neurology followed and etiology 2/2   -Continue ASA 81mg daily    Alzheimer's Dementia - followed by Dr. Blaine Yoder as outpatient. Patient with overwhelming symptom burden. Family is interested in comfort care measures at this point and home hospice set up.   -Continue home medications: aricept, risperdal, zoloft, ativan.   -Further changes to be determined by hospice      Hypertension: Stable at 125/66. Patient initially hypotensive on admission. BP responded to fluids. Home BP meds held while inpatient.   - Can resume lisinopril and atenalol       Diabetes Mellitus T2 with Peripheral Neuropathy: A1C 6.2. Patient does not take home medications or insulin. -130  - Hospice to determine further management      Diabetic Foot Ulcers & Sacral Ulcer - 2 ulcers noted on right foot. Also a sacral ulcer noted on admission. Do not appear infected. Healing.  Wound care services from home health tend to these regularly.  -Continue wound care management at home      Chronic Dysphagia - Family reports difficulty with feeding recenlty 2/2 dysphagia. Poor intake persists.   -Pt to continue with dysphagia 1 diet with honey thick liquid    Oral Candidiasis - noted on physical exam. Improved  -Can continue oral nystatin outpatient       Spinal Stenosis w/ Chronic Back Pain - patient on tramadol at home.   -Continue Tramadol.   -Further pain management to be determined by hospice     GERD - Stable. -Continue Prilosec      Major Depressive Disorder - Stable. -Continue Zoloft.      Obesity: PT with BMI of Body mass index is 30.56 kg/(m^2). Bedbound due to dementia.      Physical exam at discharge:    General Appearance: No acute distress and not in pain. Patient answers questions appropriately. Pleasant. HEENT: Dry mucous membranes. Candiasis improved. Lungs: Good air movement, wheezing bilaterally. No signs of respiratory distress. Heart: Normal rate. Regular rhythm. S1 normal and S2 normal. No murmur, gallop or friction rub. Extremities: Normal range of motion. 2 ulcers located on the lateral aspect of the right foot. Appear to be healing. No cords, edema or tenderness. Neurological: Patient is alert but not oriented. Normal strength. Right sided facial droop. 3/5 strength in right upper and lower extremity. 4/5 strength on left. CN II-XII grossly intact. Skin: Warm and dry. Abdomen: Abdomen is soft and non-distended. Bowel sounds are normal. There is no abdominal tenderness. There is no rebound tenderness. There is no guarding. There is no mass. There is no splenomegaly. Pupils: Pupils are equal, round, and reactive to light. Pulses: Distal pulses are intact.      Condition at discharge: Stable     Labs  Recent Labs      04/11/17   0609  04/10/17   0559  04/09/17   0339   WBC  11.9*  9.0  11.8*   HGB  10.6*  9.6*  10.6*   HCT  31.9*  30.1*  31.8*   PLT  373  299  319     Recent Labs      04/11/17 3089  04/10/17   0559  04/09/17   0339   NA  146*  143  140   K  4.0  3.7  4.3   CL  112*  110*  107   CO2  27  24  22   BUN  12  19  23*   CREA  0.86  1.01  0.92   GLU  93  97  99   CA  7.9*  7.8*  7.9*   MG  1.8  1.9  1.9  1.8   PHOS  2.9  2.9  3.5     Recent Labs      04/11/17   0609  04/10/17   0559  04/09/17   0339   SGOT  41*  31  22   ALT  47  39  41   AP  108  96  106   TBILI  0.3  0.4  0.3   TP  5.4*  5.6*  5.4*   ALB  2.1*  1.9*  2.0*   GLOB  3.3  3.7  3.4     Recent Labs      04/09/17   0339  04/08/17   1453  04/08/17   1432   INR  1.2*  1.1  1.2*   PTP  11.9*  10.9   --    APTT  31.3   --    --       No results for input(s): FE, TIBC, PSAT, FERR in the last 72 hours. No results for input(s): PH, PCO2, PO2 in the last 72 hours. No results for input(s): CPK, CKMB in the last 72 hours. No lab exists for component: TROPONINI  Lab Results   Component Value Date/Time    Glucose (POC) 102 04/11/2017 07:49 AM    Glucose (POC) 154 04/10/2017 08:46 PM    Glucose (POC) 125 04/10/2017 04:46 PM    Glucose (POC) 149 04/10/2017 11:08 AM    Glucose (POC) 104 04/10/2017 07:25 AM        Blood cultures NG x 3days    Diagnostic studies  -CT head without contrast (4/8): No evidence for acute intracranial abnormality  -CXR (4/8): Minimal right basilar opacity favoring atelectasis. No other significant lung  abnormality   -CT Head Neck (4/8): impression: No significant abnormalities suspected. -MRI head w/o contrast (4/8):  Negative. -CXR (4/8): No acute changes. Procedures  -None    Disposition:  Home hospice with [facility]    Discharge/Transfer Medications  Current Discharge Medication List      START taking these medications    Details   amoxicillin-clavulanate (AUGMENTIN) 875-125 mg per tablet Take 1 Tab by mouth two (2) times a day for 4 days.   Qty: 8 Tab, Refills: 0         CONTINUE these medications which have NOT CHANGED    Details   nicotinic acid (NIACIN) 500 mg tablet Take 500 mg by mouth daily (with lunch). atenolol (TENORMIN) 50 mg tablet Take 25 mg by mouth daily. donepezil (ARICEPT) 10 mg tablet Take 10 mg by mouth daily (with dinner). pregabalin (LYRICA) 150 mg capsule Take 150 mg by mouth two (2) times a day. At 08:00 and 17:00      traMADol (ULTRAM) 50 mg tablet Take 100 mg by mouth every six (6) hours as needed for Pain.      lutein-zeaxanthin 25-5 mg cap Take 1 Cap by mouth daily (with lunch). therapeutic multivitamin (THERAGRAN) tablet Take 1 Tab by mouth daily. loratadine (CLARITIN) 10 mg tablet Take 10 mg by mouth daily. LORazepam (ATIVAN) 0.5 mg tablet Take 0.5 mg by mouth daily (with dinner). !! risperiDONE (RISPERDAL) 1 mg tablet Take 1 mg by mouth daily (with breakfast). !! risperiDONE (RISPERDAL) 1 mg tablet Take 2 mg by mouth daily (with dinner). omeprazole (PRILOSEC) 40 mg capsule TAKE ONE CAPSULE BY MOUTH ONCE DAILY FOR  ACID  REFLUX  Qty: 90 Cap, Refills: 3      lisinopril (PRINIVIL, ZESTRIL) 5 mg tablet Take 1 Tab by mouth daily. Qty: 30 Tab, Refills: 5      meclizine (ANTIVERT) 12.5 mg tablet Take 1 Tab by mouth three (3) times daily as needed. Qty: 90 Tab, Refills: 4      traZODone (DESYREL) 50 mg tablet TAKE 1 TABLET BY MOUTH NIGHTLY FOR SLEEP  Qty: 30 Tab, Refills: 5      sertraline (ZOLOFT) 100 mg tablet Take 1.5 Tabs by mouth daily. Qty: 45 Tab, Refills: 11    Associated Diagnoses: Anxiety      simvastatin (ZOCOR) 40 mg tablet TAKE 1 TABLET BY MOUTH NIGHTLY  Qty: 90 Tab, Refills: 3      latanoprost (XALATAN) 0.005 % ophthalmic solution Administer 1 Drop to right eye nightly. aspirin delayed-release 81 mg tablet Take 1 Tab by mouth daily. Associated Diagnoses: Type II or unspecified type diabetes mellitus without mention of complication, not stated as uncontrolled      naproxen sodium (ALEVE) 220 mg tablet Take 220 mg by mouth two (2) times daily as needed for Pain.  Give with food      cholecalciferol, vitamin d3, (VITAMIN D) 1,000 unit tablet Take 1,000 Units by mouth daily. Indications: VITAMIN D DEFICIENCY       !! - Potential duplicate medications found. Please discuss with provider.            Diet:  Dysphagia diet as previously tolerated     Activity:  As tolerated     Discharge instructions to patient/family  Please seek medical attention for any new or worsening symptoms particularly fever, chest pain, shortness of breath, abdominal pain, nausea, vomiting    Follow up plans/appointments  Follow-up Information     Follow up With Details Comments [facility]   [address] [place]             Michael Wheatley MD  Family Medicine Resident    Cc: Jamari Mendez MD

## 2017-04-10 NOTE — CONSULTS
Palliative Medicine Consult  See: [phone] ([id number])    Patient Name: Razia Pagan  YOB: 1945    Date of Initial Consult: 4/10/17  Reason for Consult: Care decisions  Requesting Provider: Dr. Tessie Rosario  Primary Care Physician: Jamari Mendez MD      SUMMARY:   Razia Pagan is a 66 y.o. with a past history of AD, CVA, HTN, who was admitted on 4/8/2017 from home with a diagnosis of aspiration pneumonia. Current medical issues leading to Palliative Medicine involvement include: Care decisions. Chart reviewed. Met with spouse. Patient admitted with aspiration pneumonia with associated dysphagia related to progression of AD       PALLIATIVE DIAGNOSES:   1. GOC discussion  2. AD-FAST 7C-unable to ambulate   3. Debility  4. Severe protein malnutrition(Albumin of 1.9)  5. Aspiration with associated dysphagia       PLAN:   1. Dick Vivas, and I met with spouse and patient. Patient not able to provide history but able to communicate. Brianna Jimenez able to provide history. Patient has shown significant decline over the last couple months to include inability to ambulate, formation of decubitus on sacral/heal areas, decreased appetite with pocketing of food/secretions/dysphagia. She has had help with [facility], Home PT/OT, home speech and in home physician who started making home visits. Basically, unable to participate with PT/OT anymore. She understands the swallowing issues and the current medical problems  2. GOC-her number one goal is to keep her  at home. We reviewed different options which would include continued current regiment and readmissions to the hospital vs Hospice care. She thinks Hospice care best option at this time but was not sure he qualified. Explained to her he meets criteria. Trinidad Esparza able to review with her the benefits provided by Hospice. She would like to meet with Hospice rep so consult placed via   3. Aspiration-reviewed with her the natural progression of AD.  She worries about \"starving him\" and explained to her the process the body goes thru towards the end of life and she clearly would not be starving him. We will provide written information for her. Discussed the risks/benefits of a PEG in patient's with AD. Explained this certainly would not stop aspiration and would discourage in this situation. She understands and does state she would not want to do anything that is potentially harmful to him  4. Symptom management-denies pain at this time. Will monitor for nonverbal cues  5. Psychosocial-Pamela is primary caregiver. Daughter(Kristy) lives [place] and [place]. Shu Ramey wants to keep him home but does not have consistent help. 6. Initial consult note routed to primary continuity provider  7. Communicated plan of care with: Palliative Ashley TURNER(DELONTE), nursing staff       GOALS OF CARE / TREATMENT PREFERENCES:   [====Goals of Care====]  GOALS OF CARE:  Patient / health care proxy stated goals: return him home      TREATMENT PREFERENCES:   Code Status: DNR    Advance Care Planning:  Advance Care Planning 4/10/2017   Patient's Healthcare Decision Maker is: Legal Next of Rosana 68   Primary Decision Maker Name Leslee Ramos   Primary Decision Maker Phone Number [phone]   Primary Decision Maker Relationship to Patient Spouse   Secondary Decision Maker Name -   Confirm Advance Directive -   Patient Would Like to Complete Advance Directive -       Other:    The palliative care team has discussed with patient / health care proxy about goals of care / treatment preferences for patient.  [====Goals of Care====]         HISTORY:     History obtained from: chart, patient, spouse, nursing staff    CHIEF COMPLAINT: cough/SOB    HPI/SUBJECTIVE:    The patient is:   [x] Verbal and participatory  [] Non-participatory due to:   Patient is verbal. Denies pain. Ask us how we are doing.      Clinical Pain Assessment (nonverbal scale for severity on nonverbal patients):   [++++ Clinical Pain Assessment++++]  [++++Pain Severity++++]: Pain: 0  [++++Pain Character++++]:   [++++Pain Duration++++]:   [++++Pain Effect++++]: denies pain  [++++Pain Factors++++]:   [++++Pain Frequency++++]:   [++++Pain Location++++]:   [++++ Clinical Pain Assessment++++]     FUNCTIONAL ASSESSMENT:     Palliative Performance Scale (PPS):  PPS: 50       PSYCHOSOCIAL/SPIRITUAL SCREENING:     Advance Care Planning:  Advance Care Planning 4/10/2017   Patient's Healthcare Decision Maker is: Legal Next of Rosana 68   Primary Decision Maker Name Colette Cobb   Primary Decision Maker Phone Number [phone]   Primary Decision Maker Relationship to Patient Spouse   Secondary Decision Maker Name -   Confirm Advance Directive -   Patient Would Like to Complete Advance Directive -        Any spiritual / Christian concerns:  [] Yes /  [x] No    Caregiver Burnout:  [] Yes /  [x] No /  [] No Caregiver Present      Anticipatory grief assessment:   [x] Normal  / [] Maladaptive       ESAS Anxiety: Anxiety: 0    ESAS Depression: Depression: 0        REVIEW OF SYSTEMS:     Positive and pertinent negative findings in ROS are noted above in HPI. The following systems were [x] reviewed / [] unable to be reviewed as noted in HPI  Other findings are noted below. Systems: constitutional, ears/nose/mouth/throat, respiratory, gastrointestinal, genitourinary, musculoskeletal, integumentary, neurologic, psychiatric, endocrine. Positive findings noted below. Modified ESAS Completed by: provider   Fatigue: 2 Drowsiness: 1   Depression: 0 Pain: 0   Anxiety: 0 Nausea: 0   Anorexia: 3 Dyspnea: 4     Constipation: No     Stool Occurrence(s): 1        PHYSICAL EXAM:     From RN flowsheet:  Wt Readings from Last 3 Encounters:   04/08/17 183 lb 10.3 oz (83.3 kg)   03/09/17 180 lb (81.6 kg)   02/24/17 174 lb (78.9 kg)     Blood pressure 112/58, pulse 66, temperature 98.2 °F (36.8 °C), resp.  rate 18, height 5' 5\" (1.651 m), weight 183 lb 10.3 oz (83.3 kg), SpO2 99 %.     Pain Scale 1: Visual  Pain Intensity 1: 0                 Last bowel movement, if known:     Constitutional: alert to person, knows his wife  Eyes: pupils equal, anicteric  ENMT: no nasal discharge, moist mucous membranes  Cardiovascular: regular rhythm, distal pulses intact  Respiratory: breathing not labored, symmetric  Gastrointestinal: soft non-tender, +bowel sounds  Musculoskeletal: no deformity, no tenderness to palpation  Skin: warm, dry, skin breakdown on heels and sacral area-covered  Neurologic: following commands, moving all extremities  Psychiatric: alert to person only  Other:       HISTORY:     Active Problems:    Aspiration pneumonia ([address] (4/8/2017)      Right sided weakness (4/8/2017)      Past Medical History:   Diagnosis Date    Anxiety     DDD (degenerative disc disease), cervical     DDD (degenerative disc disease), lumbar     Dementia     Diabetes ([address]     Falls     Fatigue     GERD (gastroesophageal reflux disease)     Glaucoma     Glaucoma     right eye    Hearing loss     Horseshoe kidney     Hypertension     Irritable bowel syndrome     Neuropathy     Osteoarthrosis, unspecified whether generalized or localized, other specified sites     Pressure sore on heel     Pure hypercholesterolemia     Ringing in ears     Severe single current episode of major depressive disorder, with psychotic features ([address] 6/22/2016    Snoring     Spinal stenosis, unspecified region other than cervical     Vertigo       Past Surgical History:   Procedure Laterality Date    AMB POC EEG / SLEEP DEPRIVED      APPENDECTOMY      HX CHOLECYSTECTOMY  3/10    HX HEENT      HX HERNIA REPAIR  left inguinal    HX ORTHOPAEDIC      lumbar decompression    HX SPLENECTOMY  1963    HX SPLENECTOMY      LAMINECTOMY,LUMBAR  1992      Family History   Problem Relation Age of Onset    Stroke Father     Diabetes Mother     Hypertension Mother     Heart Disease Mother     Cancer Mother    [facility] Dementia Mother     Stroke Mother     Dementia Sister     Stroke Sister     Dementia Other       History reviewed, no pertinent family history.   Social History   Substance Use Topics    Smoking status: Never Smoker    Smokeless tobacco: Never Used    Alcohol use No     Allergies   Allergen Reactions    Hydrocodone-Acetaminophen Rash     Morbiliform exanthem rash    Tolerates tramadol    Morphine Rash    Erythromycin Nausea and Vomiting    Sulfa (Sulfonamide Antibiotics) Rash    Adhesive Tape-Silicones Hives    Cinnamon Nausea Only    Codeine Other (comments)     Hallucinations  Tolerates tramadol    Gabapentin Rash     Tolerates pregabalin    Shellfish Derived Nausea and Vomiting      Current Facility-Administered Medications   Medication Dose Route Frequency    melatonin tablet 3 mg  3 mg Oral QHS PRN    sodium chloride (NS) flush 5-10 mL  5-10 mL IntraVENous Q8H    sodium chloride (NS) flush 5-10 mL  5-10 mL IntraVENous PRN    sodium chloride (NS) flush 5-10 mL  5-10 mL IntraVENous PRN    donepezil (ARICEPT) tablet 10 mg  10 mg Oral DAILY WITH DINNER    latanoprost (XALATAN) 0.005 % ophthalmic solution 1 Drop  1 Drop Right Eye QHS    LORazepam (ATIVAN) tablet 0.5 mg  0.5 mg Oral DAILY WITH DINNER    pantoprazole (PROTONIX) tablet 40 mg  40 mg Oral DAILY    pregabalin (LYRICA) capsule 150 mg  150 mg Oral BID    risperiDONE (RisperDAL) tablet 1 mg  1 mg Oral DAILY WITH BREAKFAST    risperiDONE (RisperDAL) tablet 2 mg  2 mg Oral DAILY WITH DINNER    sertraline (ZOLOFT) tablet 150 mg  150 mg Oral DAILY    simvastatin (ZOCOR) tablet 40 mg  40 mg Oral QHS    sodium chloride (NS) flush 5-10 mL  5-10 mL IntraVENous Q8H    sodium chloride (NS) flush 5-10 mL  5-10 mL IntraVENous PRN    enoxaparin (LOVENOX) injection 40 mg  40 mg SubCUTAneous Q24H    acetaminophen (TYLENOL) tablet 650 mg  650 mg Oral Q4H PRN    ampicillin-sulbactam (UNASYN) 3 g in 0.9% sodium chloride (MBP/ADV) 100 mL  3 g IntraVENous Q6H    0.9% sodium chloride infusion  100 mL/hr IntraVENous CONTINUOUS    nystatin (MYCOSTATIN) 100,000 unit/mL oral suspension 500,000 Units  500,000 Units Oral QID    insulin lispro (HUMALOG) injection   SubCUTAneous AC&HS    glucose chewable tablet 16 g  4 Tab Oral PRN    dextrose (D50W) injection syrg 12.5-25 g  12.5-25 g IntraVENous PRN    glucagon (GLUCAGEN) injection 1 mg  1 mg IntraMUSCular PRN          LAB AND IMAGING FINDINGS:     Lab Results   Component Value Date/Time    WBC 9.0 04/10/2017 05:59 AM    HGB 9.6 04/10/2017 05:59 AM    PLATELET 627 91/89/8603 05:59 AM     Lab Results   Component Value Date/Time    Sodium 143 04/10/2017 05:59 AM    Potassium 3.7 04/10/2017 05:59 AM    Chloride 110 04/10/2017 05:59 AM    CO2 24 04/10/2017 05:59 AM    BUN 19 04/10/2017 05:59 AM    Creatinine 1.01 04/10/2017 05:59 AM    Calcium 7.8 04/10/2017 05:59 AM    Magnesium 1.9 04/10/2017 05:59 AM    Phosphorus 2.9 04/10/2017 05:59 AM      Lab Results   Component Value Date/Time    AST (SGOT) 31 04/10/2017 05:59 AM    Alk. phosphatase 96 04/10/2017 05:59 AM    Protein, total 5.6 04/10/2017 05:59 AM    Albumin 1.9 04/10/2017 05:59 AM    Globulin 3.7 04/10/2017 05:59 AM     Lab Results   Component Value Date/Time    INR 1.2 04/09/2017 03:39 AM    Prothrombin time 11.9 04/09/2017 03:39 AM    aPTT 31.3 04/09/2017 03:39 AM      No results found for: IRON, FE, TIBC, IBCT, PSAT, FERR   No results found for: PH, PCO2, PO2  No components found for: [place]   Lab Results   Component Value Date/Time     02/17/2017 03:56 PM    CK - MB 4.2 02/17/2017 03:56 PM                Total time: 70  Counseling / coordination time: 60  > 50% counseling / coordination?: yes    Prolonged service was provided for  []30 min   []75 min in face to face time in the presence of the patient. Time Start:   Time End:   Note: this can only be billed with 68868 (initial) or [id number] (follow up).   If multiple start / stop times, list each separately.

## 2017-04-10 NOTE — CDMP QUERY
There is noted documentation of : \"Severe Protein Calorie Malnutrition\" for this patient. Currently the patient does not meet Aspen Criteria for this secondary diagnosis and may be challenged by an external reviewer. Can this secondary diagnosis be ruled out for this patient?    =>  =>Other Explanation of clinical findings  =>Unable to Determine (no explanation of clinical findings)    The medical record reflects the following clinical findings, treatment, and risk factors:    Risk Factors: Dysphagia, Alzheimers dementia, Diabetic foot ulcers    Clinical Indicators: 4/10 RD consult: states: Wt pretty stable, slight fluctuations up or down ~10lbs over the last ~2 yrs. Wife denies pt has any n/v or GI complaints. Wife agreeable to added protein sources for p/u. Pt has already made decision not to entertain the idea of a PEG. BG are slightly elevated 104, 162, 108, 136mg/dL. A1C . No clinical indicators for malnutrition identified. BMI 30;56 kg (obesity)    Treatment: RD consult; Magic cup, prosource with meals    Please clarify and document your clinical opinion in the progress notes and discharge summary including the definitive and/or presumptive diagnosis, (suspected or probable), related to the above clinical findings. Please include clinical findings supporting your diagnosis.     Thank you,  Chloé Reed, MSN, [facility], [facility]

## 2017-04-10 NOTE — PROGRESS NOTES
Bedside shift change report given to Prema Asencio (oncoming nurse) by Melisa Felty RN (offgoing nurse). Report included the following information SBAR, Kardex, Intake/Output, MAR, Recent Results and Cardiac Rhythm NSR.

## 2017-04-10 NOTE — PROGRESS NOTES
Problem: Self Care Deficits Care Plan (Adult)  Goal: *Acute Goals and Plan of Care (Insert Text)  Occupational Therapy Goals  Initiated 4/10/2017  1. Patient will perform grooming with minimal assistance/contact guard assist within 7 day(s). 2. Patient will participate in upper extremity therapeutic exercise/activities with supervision/set-up for 5 minutes within 7 day(s). OCCUPATIONAL THERAPY EVALUATION  Patient: Milagro Molina (82 y.o. male)  Date: 4/10/2017  Primary Diagnosis: Aspiration pneumonia ([address]  Right sided weakness        Precautions: fall         ASSESSMENT :  Based on the objective data described below, the patient presents with total assistance to moderate assist for ADLs. Patient and wife leaning toward home with hospice care, but nothing solidified at this point. Patient appreciative of OT evaluation and agreeable to exercise and grooming goal. Patient note with LUE swelling and patient able to complete UE exercises sitting EOB. Patient to be seen 2x per week until plan is in place. Patient wife wishes for patient to return home. Patient will benefit from skilled intervention to address the above impairments.   Patients rehabilitation potential is considered to be Guarded  Factors which may influence rehabilitation potential include:   [ ]             None noted  [X]             Mental ability/status  [X]             Medical condition  [ ]             Home/family situation and support systems  [ ]             Safety awareness  [ ]             Pain tolerance/management  [ ]             Other:        PLAN :  Recommendations and Planned Interventions:  [X]               Self Care Training                  [X]        Therapeutic Activities  [X]               Functional Mobility Training    [ ]        Cognitive Retraining  [X]               Therapeutic Exercises           [ ]        Endurance Activities  [X]               Balance Training                   [ ]        Neuromuscular Re-Education  [ ]               Visual/Perceptual Training     [X]   Home Safety Training  [X]               Patient Education                 [X]        Family Training/Education  [ ]               Other (comment):     Frequency/Duration: Patient will be followed by occupational therapy 2 times a week to address goals. Discharge Recommendations: To Be Determined  Further Equipment Recommendations for Discharge: TBD        SUBJECTIVE:   Patient stated Thank you. Have a blessed day.       OBJECTIVE DATA SUMMARY:   HISTORY:   Past Medical History:   Diagnosis Date    Anxiety      DDD (degenerative disc disease), cervical      DDD (degenerative disc disease), lumbar      Dementia      Diabetes ([address]      Falls      Fatigue      GERD (gastroesophageal reflux disease)      Glaucoma      Glaucoma       right eye    Hearing loss      Horseshoe kidney      Hypertension      Irritable bowel syndrome      Neuropathy      Osteoarthrosis, unspecified whether generalized or localized, other specified sites      Pressure sore on heel      Pure hypercholesterolemia      Ringing in ears      Severe single current episode of major depressive disorder, with psychotic features ([address] 6/22/2016    Snoring      Spinal stenosis, unspecified region other than cervical      Vertigo       Past Surgical History:   Procedure Laterality Date    AMB POC EEG / SLEEP DEPRIVED        APPENDECTOMY        HX CHOLECYSTECTOMY   3/10    HX HEENT        HX HERNIA REPAIR   left inguinal    HX ORTHOPAEDIC         lumbar decompression    HX SPLENECTOMY   1963    HX SPLENECTOMY        [place]        Prior Level of Function/Home Situation: patient total assist for ADLs at home.    Expanded or extensive additional review of patient history:      Home Situation  Home Environment: Private residence  Wheelchair Ramp: Yes  One/Two Story Residence: One story  Living Alone: No  Support Systems: Spouse/Significant Other/Partner, Child(harinder), [employer]  Patient Expects to be Discharged to[de-identified] Private residence  Current DME Used/Available at Home: Hospital bed, Wheelchair, Commode, bedside  Tub or Shower Type:  (sponge in bed )  [X]  Right hand dominant             [ ]  Left hand dominant     EXAMINATION OF PERFORMANCE DEFICITS:  Cognitive/Behavioral Status:  Neurologic State: Alert  Orientation Level: Unable to verbalize  Cognition: Decreased command following  Perception: Appears intact  Perseveration: No perseveration noted  Safety/Judgement: Lack of insight into deficits     Skin: intact as seen     Edema: swelling to LUE     Hearing: Auditory  Auditory Impairment: None     Vision/Perceptual:            Range of Motion:  AROM: Generally decreased, functional        Strength:  Strength: Generally decreased, functional                 Coordination:  Coordination: Generally decreased, functional             Tone & Sensation:  Tone: Normal  Sensation: Intact        Balance:  Sitting: High guard; Impaired  Sitting - Static: Good (unsupported)  Sitting - Dynamic: Occassional     Functional Mobility and Transfers for ADLs:  Bed Mobility:  Rolling: Maximum assistance;Assist x2  Supine to Sit: Maximum assistance;Assist x2  Sit to Supine: Total assistance;Assist x2  Scooting: Maximum assistance     Transfers: Toilet Transfer : Total assistance     ADL Assessment:        Oral Facial Hygiene/Grooming: Moderate assistance     Bathing: Total assistance     Upper Body Dressing: total assistance     Lower Body Dressing: Total assistance     Toileting:  Total assistance                 ADL Intervention and task modifications:           Cognitive Retraining  Safety/Judgement: Lack of insight into deficits     Therapeutic Exercise:     Functional Measure:  Barthel Index:      Bathin  Bladder: 0  Bowels: 0  Groomin  Dressin  Feedin  Mobility: 0  Stairs: 0  Toilet Use: 0  Transfer (Bed to Chair and Back): 0  Total: 0         Barthel and G-code impairment scale:  Percentage of impairment CH  0% CI  1-19% CJ  20-39% CK  40-59% CL  60-79% CM  80-99% CN  100%   Barthel Score 0-100 100 99-80 79-60 59-40 20-39 1-19    0   Barthel Score 0-20 20 17-19 13-16 9-12 5-8 1-4 0      The Barthel ADL Index: Guidelines  1. The index should be used as a record of what a patient does, not as a record of what a patient could do. 2. The main aim is to establish degree of independence from any help, physical or verbal, however minor and for whatever reason. 3. The need for supervision renders the patient not independent. 4. A patient's performance should be established using the best available evidence. Asking the patient, friends/relatives and nurses are the usual sources, but direct observation and common sense are also important. However direct testing is not needed. 5. Usually the patient's performance over the preceding 24-48 hours is important, but occasionally longer periods will be relevant. 6. Middle categories imply that the patient supplies over 50 per cent of the effort. 7. Use of aids to be independent is allowed. Shaheen Carballo., Barthel, DCHERISE. (1806). Functional evaluation: the Barthel Index. [facility] (14)2. Aydin michaelle Gerald, CHASTITY, Ford Dunn., Doug Baker., [facility], [address] [place] [address] (1999). Measuring the change indisability after inpatient rehabilitation; comparison of the responsiveness of the Barthel Index and Functional Bath Measure. Journal of Neurology, Neurosurgery, and Psychiatry, 66(4), 315-445. Christina Lala, N.J.A, TAN Evans, & Adriane Hi, M.A. (2004.) Assessment of post-stroke quality of life in cost-effectiveness studies: The usefulness of the Barthel Index and the EuroQoL-5D. Quality of Life Research, 13, 207-50            G codes:   In compliance with CMSs Claims Based Outcome Reporting, the following G-code set was chosen for this patient based on their primary functional limitation being treated: The outcome measure chosen to determine the severity of the functional limitation was the Barthel Index with a score of 0/100 which was correlated with the impairment scale. · Self Care:               - CURRENT STATUS:    CN - 100% impaired, limited or restricted               - GOAL STATUS:           CM - 80%-99% impaired, limited or restricted               - D/C STATUS:                       ---------------To be determined---------------      Occupational Therapy Evaluation Charge Determination   History Examination Decision-Making   LOW Complexity : Brief history review  LOW Complexity : 1-3 performance deficits relating to physical, cognitive , or psychosocial skils that result in activity limitations and / or participation restrictions  MEDIUM Complexity : Patient may present with comorbidities that affect occupational performnce. Miniml to moderate modification of tasks or assistance (eg, physical or verbal ) with assesment(s) is necessary to enable patient to complete evaluation       Based on the above components, the patient evaluation is determined to be of the following complexity level: MEDIUM  Pain:  Pain Scale 1: Numeric (0 - 10)  Pain Intensity 1: 0              Activity Tolerance:   VSS  Please refer to the flowsheet for vital signs taken during this treatment. After treatment:   [ ] Patient left in no apparent distress sitting up in chair  [ ] Patient left in no apparent distress in bed  [ ] Call bell left within reach  [ ] Nursing notified  [ ] Caregiver present  [ ] Bed alarm activated      COMMUNICATION/EDUCATION:   The patients plan of care was discussed with: Physical Therapy Assistant. and registered nurse  [X] Home safety education was provided and the patient/caregiver indicated understanding. [X] Patient/family have participated as able in goal setting and plan of care. [X] Patient/family agree to work toward stated goals and plan of care.   [ ] Patient understands intent and goals of therapy, but is neutral about his/her participation. [ ] Patient is unable to participate in goal setting and plan of care. This patients plan of care is appropriate for delegation to NORY.      Thank you for this referral.  Juan C Mathew OTR/L  Time Calculation: 20 mins

## 2017-04-10 NOTE — PROGRESS NOTES
Problem: Dysphagia (Adult)  Goal: *Acute Goals and Plan of Care (Insert Text)  Swallowing goals initaited 4-10-17:  1) tolerate dysphagia 1, honey thick liquid with careful feeding with minimal aspiration by 4-14=17  2) Family and MD to determine POC for comfort Vs other by 4-14-17  SPEECH [address] EVALUATION  Patient: Toya Blue (38 y.o. male)  Date: 4/10/2017  Primary Diagnosis: Aspiration pneumonia ([address]  Right sided weakness        Precautions: aspiration         ASSESSMENT :  Based on the objective data described below, the patient presents with moderate oral  Dysphagia (oral holding, oral leakage intermittantly) and pharyngeal phase (moderate to severe weakness, mild-moderate delay in swallow). Highly suspect aspiration, pharyngeal residue. Wife appears realistic about his prognosis. She is a good candidate for palliative care to discuss goals of care: She does not want to him to [place] to death\" or \"choke to death\"  Patient with end-stage dementia     Patient will benefit from skilled intervention to address the above impairments. Patients rehabilitation potential is considered to be Guarded  Factors which may influence rehabilitation potential include:   [ ]            None noted  [X]            Mental ability/status  [X]            Medical condition  [ ]            Home/family situation and support systems  [ ]            Safety awareness  [ ]            Pain tolerance/management  [ ]            Other:        PLAN :  Recommendations and Planned Interventions:  Dysphagia 1, honey thick liquids. Feed only when awake and alert. Palliative care  Frequency/Duration: Patient will be followed by speech-language pathology 2 times a week to address goals. Discharge Recommendations: HH/hospice? SUBJECTIVE:   Patient's wife stated that he has been having increasing dysphagia for the last few months. Just recently started on thickened liquids and vital stim with HH. OBJECTIVE:       Past Medical History:   Diagnosis Date    Anxiety      DDD (degenerative disc disease), cervical      DDD (degenerative disc disease), lumbar      Dementia      [facility]      Falls      Fatigue      GERD (gastroesophageal reflux disease)      Glaucoma      Glaucoma       right eye    Hearing loss      Horseshoe kidney      Hypertension      Irritable bowel syndrome      Neuropathy      Osteoarthrosis, unspecified whether generalized or localized, other specified sites      Pressure sore on heel      Pure hypercholesterolemia      Ringing in ears      Severe single current episode of major depressive disorder, with psychotic features ([address] 6/22/2016    Snoring      Spinal stenosis, unspecified region other than cervical      Vertigo       Past Surgical History:   Procedure Laterality Date    AMB POC EEG / SLEEP DEPRIVED        APPENDECTOMY        HX CHOLECYSTECTOMY   3/10    HX HEENT        HX HERNIA REPAIR   left inguinal    HX ORTHOPAEDIC         lumbar decompression    HX SPLENECTOMY   1963    HX SPLENECTOMY        [place]     Prior Level of Function/Home Situation:   Home Situation  Home Environment: Private residence  Wheelchair Ramp: Yes  One/Two Story Residence: One story  Living Alone: No  Support Systems: Spouse/Significant Other/Partner, Child(harinder), [employer] community  Patient Expects to be Discharged to[de-identified] Private residence  Current DME Used/Available at Home: Hospital bed, Wheelchair, Commode, bedside  Tub or Shower Type:  (sponge in bed )  Diet prior to admission: purees, nectars  Current Diet:  Purees, honey thick liquids.      Cognitive and Communication Status:  Neurologic State: Alert  Orientation Level: Unable to verbalize  Cognition: Decreased command following  Perception: Appears intact     Safety/Judgement: Lack of insight into deficits  Oral Assessment:  Oral Assessment  Labial:  (masked facies, protruding lower lip)  Dentition: Limited;Natural  Oral Hygiene: GOOD. wife reports they were going to dentist until recently  Lingual: No impairment  Mandible: No impairment  P.O. Trials:  Patient Position: upright in bed  Vocal quality prior to P.O.: No impairment  Consistency Presented: Puree;Nectar thick liquid;Honey thick liquid  How Presented: Spoon;Straw (wife fed)   ORAL PHASE:   Bolus Acceptance:  (wife reports R oral leakage at times and oral holding)  Bolus Formation/Control: Impaired (at times)  Type of Impairment: Delayed;Drooling  Propulsion: Delayed (# of seconds)  Oral Residue: Less than 10% of bolus (wife reports more at times)   PHARYNGEAL PHASE:   Initiation of Swallow: Delayed (# of seconds) (mild-moderate)  Laryngeal Elevation: Weak (moderate to severe)  Aspiration Signs/Symptoms: Change vocal quality;Clear throat;Weak cough;Runny nose (suspect difficutly with secretion management; suspect pharygneal reisdue)  Pharyngeal Phase Characteristics: Effortful swallow;Double swallow (suspect penetration from residue and probable aspiration)                       NOMS:   The NOMS functional outcome measure was used to quantify this patient's level of swallowing impairment. Based on the NOMS, the patient was determined to be at level 3 for swallow function      G Codes: In compliance with CMSs Claims Based Outcome Reporting, the following G-code set was chosen for this patient based the use of the NOMS functional outcome to quantify this patient's level of swallowing impairment. Using the NOMS, the patient was determined to be at level 3 for swallow function which correlates with the CL= 60-79% level of severity.      Based on the objective assessment provided within this note, the current, goal, and discharge g-codes are as follows:     Swallow  Swallowing:   Swallow Current Status CL= 60-79%   Swallow Goal Status CK= 40-59%         NOMS Swallowing Levels:  Level 1 (CN): NPO  Level 2 (CM): NPO but takes consistency in therapy  Level 3 (CL): Takes less than 50% of nutrition p.o. and continues with nonoral feedings; and/or safe with mod cues; and/or max diet restriction  Level 4 (CK): Safe swallow but needs mod cues; and/or mod diet restriction; and/or still requires some nonoral feeding/supplements  Level 5 (CJ): Safe swallow with min diet restriction; and/or needs min cues  Level 6 (CI): Independent with p.o.; rare cues; usually self cues; may need to avoid some foods or needs extra time  Level 7 ([address] [place] [address]): Independent for all p.o.  JOSEPHINE. (2003). National Outcomes Measurement System (NOMS): Adult Speech-Language Pathology User's Guide. Pain:  Pain Scale 1: Visual  Pain Intensity 1: 0     After treatment:   [ ]            Patient left in no apparent distress sitting up in chair  [ ]            Patient left in no apparent distress in bed  [ ]            Call bell left within reach  [ ]            Nursing notified  [ ]            Caregiver present  [ ]            Bed alarm activated      COMMUNICATION/EDUCATION:   The patients plan of care including recommendations, planned interventions, and recommended diet changes were discussed with: Registered Nurse and Lin.     Patient was educated regarding His deficit(s) of DYSPHAGIA  as this relates to His diagnosis of ASPIRATION PNA. He demonstrated Guarded understanding as evidenced by discussion. Wife present and understood. .  [ ]            Posted safety precautions in patient's room. [X]            Patient/family have participated as able in goal setting and plan of care. [X]            Patient/family agree to work toward stated goals and plan of care. [ ]            Patient understands intent and goals of therapy, but is neutral about his/her participation. [ ]            Patient is unable to participate in goal setting and plan of care.      Thank you for this referral.  Jj Shah, SLP  Time Calculation: 30 mins

## 2017-04-10 NOTE — CONSULTS
Saray Gordon MD Physician Signed Neurology Consults Date of Service: 17 7595         []Hide copied text  []Hoangver for attribution information  Danyel Anderson [address]   [address] [facility], [address] [place] [address]  Name: Gregorio Sainz   MR#: [id number]   : 1945   Account #: [de-identified] Date of Consultation: 2017   Date of Adm: 2017         REASON FOR CONSULTATION: Request of hospitalist for his   syncopal type presentation and localizing weakness          ____________________________________________________________________    *   Please refer to the consult performed, at above date and time.       ETIENNE Gibbs  ~ Neurology NP ~

## 2017-04-10 NOTE — PROGRESS NOTES
Nutrition Assessment:    RECOMMENDATIONS/INTERVENTION(S):   Add Pudding, Magic cup, and Prosource with meals  Monitor PO intakes, ONS acceptance, BG, labs. ASSESSMENT:   4/10: 66 yr old male admitted for rt sided weakness. PMHx: Alzheimer's, DM, IBS, Gastritis, HTN, hypercholesterolemia. Spoke with wife of pt, took food preferences. Discussed options with current diet of Pureed and HONEY thick liquids. Ordered ONS. Wt pretty stable, slight fluctuations up or down ~10lbs over the last ~2 yrs. Wife denies pt has any n/v or GI complaints. Wife agreeable to added protein sources for p/u. Pt has already made decision not to entertain the idea of a PEG. BG are slightly elevated 104, 162, 108, 136mg/dL. A1C 6.2. Hgb 9.6 trending down. Diet Order: Puree, Other (comment) (HONEY thick)  % Eaten:  No data found. Pertinent Medications: [x] Reviewed    Chemistries:  Lab Results   Component Value Date/Time    Sodium 143 04/10/2017 05:59 AM    Potassium 3.7 04/10/2017 05:59 AM    Chloride 110 04/10/2017 05:59 AM    CO2 24 04/10/2017 05:59 AM    Anion gap 9 04/10/2017 05:59 AM    Glucose 97 04/10/2017 05:59 AM    BUN 19 04/10/2017 05:59 AM    Creatinine 1.01 04/10/2017 05:59 AM    BUN/Creatinine ratio 19 04/10/2017 05:59 AM    GFR est AA >60 04/10/2017 05:59 AM    GFR est non-AA >60 04/10/2017 05:59 AM    Calcium 7.8 04/10/2017 05:59 AM    AST (SGOT) 31 04/10/2017 05:59 AM    Alk. phosphatase 96 04/10/2017 05:59 AM    Protein, total 5.6 04/10/2017 05:59 AM    Albumin 1.9 04/10/2017 05:59 AM    Globulin 3.7 04/10/2017 05:59 AM    A-G Ratio 0.5 04/10/2017 05:59 AM    ALT (SGPT) 39 04/10/2017 05:59 AM      Anthropometrics: Height: 5' 5\" (165.1 cm) Weight: 83.3 kg (183 lb 10.3 oz)    IBW (%IBW): 61.7 kg (136 lb) ( ) UBW (%UBW):   (  %)    BMI: Body mass index is 30.56 kg/(m^2).     This BMI is indicative of:   [] Underweight    [] Normal    [] Overweight    [x]  Obesity    []  Extreme Obesity (BMI>40)  Estimated Nutrition Needs (Based on): 1713 Kcals/day (MSJ((1510x1.3) -250) , 83 g (-100g/day (1.0-1.2g/kg ActBW)) Protein  Carbohydrate: At Least 130 g/day  Fluids: 1700 mL/day    Last BM: 4/10   [x]Active     []Hyperactive  []Hypoactive       [] Absent   BS  Skin:    [] Intact   [] Incision  [x] Breakdown Stage II p/i, buttock, lower leg breakdown.   [] DTI   [] Tears/Excoriation/Abrasion  []Edema [] Other: Wt Readings from Last 30 Encounters:   04/08/17 83.3 kg (183 lb 10.3 oz)   03/09/17 81.6 kg (180 lb)   02/24/17 78.9 kg (174 lb)   02/17/17 80.7 kg (178 lb)   02/15/17 80.7 kg (178 lb)   02/10/17 81.1 kg (178 lb 12.8 oz)   01/30/17 78.1 kg (172 lb 3.2 oz)   12/02/16 80.3 kg (177 lb)   11/23/16 80.3 kg (177 lb)   09/26/16 80.3 kg (177 lb)   07/13/16 87.1 kg (192 lb)   06/22/16 88.2 kg (194 lb 8 oz)   04/20/16 88 kg (193 lb 14.4 oz)   03/01/16 88.5 kg (195 lb)   02/12/16 86.9 kg (191 lb 8 oz)   01/07/16 86.8 kg (191 lb 6.4 oz)   11/24/15 86.2 kg (190 lb)   08/20/15 84.4 kg (186 lb)   07/28/15 87.5 kg (192 lb 12.8 oz)   05/27/15 81.2 kg (179 lb)   01/30/15 79.8 kg (176 lb)   11/13/14 79.5 kg (175 lb 3.2 oz)   07/07/14 78.5 kg (173 lb)   05/28/14 79.7 kg (175 lb 12.8 oz)   02/18/14 82.1 kg (181 lb)   12/18/13 79.9 kg (176 lb 3.2 oz)   10/17/13 81.6 kg (179 lb 12.8 oz)   07/08/13 80 kg (176 lb 6.4 oz)   05/16/13 81.6 kg (180 lb)   01/16/13 84.8 kg (187 lb)      NUTRITION DIAGNOSES:   Problem:  Increased protein needs     Etiology: related to increased demand for protein     Signs/Symptoms: as evidenced by stage 2 p/i on buttock, lower leg skin breakdown.        NUTRITION INTERVENTIONS:  Meals/Snacks: General/healthful diet   Supplements: Commercial supplement              GOAL:   Pt will consume > 25% of meals and ONS, improve skin integrity within 2-4 days    Cultural, Christianity, or Ethnic Dietary Needs: None     LEARNING NEEDS (Diet, Food/Nutrient-Drug Interaction):    [x] None Identified   [] Identified and Education Provided/Documented   [] Identified and Pt declined/was not appropriate      [x] Interdisciplinary Care Plan Reviewed/Documented    [] Discharge Needs:    TBD   [] No Nutrition Related Discharge Needs    NUTRITION RISK:   Pt Is At Nutrition Risk  [x]     No Nutrition Risk Identified  []       PT SEEN FOR:    [x]  MD Consult: []Calorie Count      []Diabetic Diet Education        []Diet Education     []Electrolyte Management     [x]General Nutrition Management and Supplements     []Management of Tube Feeding     []TPN Recommendations    [x]  RN Referral:  [x]MST score >=2     []Enteral/Parenteral Nutrition PTA     []Pregnant: Gestational DM or Multigestation                 [x] Pressure Ulcer    []  Low BMI      []  Length of Stay       [] Dysphagia Diet         [] Ventilator            Sariah Jackson, [address]  Pager [phone]

## 2017-04-10 NOTE — PROGRESS NOTES
CM Note:  Met with pt's wife for d/c planning. PTA pt needed assistance with all ADL's and was bed-bound. DME at home includes a hospital bed, BSC and w/c. His emergency contact is his wife, Eugenia Rosario ([phone]). Pt has Rx coverage and gets his medications from [facility] in [place]. Will continue to follow and assist with any needs prior to d/c.  Plan: home with hospice. ISAÍAS Olvera    Care Management Interventions  PCP Verified by CM:  Yes (PCP is Dr. Oscar Marcus.)  Palliative Care Consult (Criteria: CHF and RRAT>21): Yes  Mode of Transport at Discharge: BLS  Transition of Care Consult (CM Consult): Discharge Planning  MyChart Signup: No  Discharge Durable Medical Equipment: No  Physical Therapy Consult: Yes  Occupational Therapy Consult: Yes  Speech Therapy Consult: Yes  Current Support Network: Lives with Spouse (Pt lives with his wife in a 1 story house with 2 steps to enter--and a ramp.)  Confirm Follow Up Transport: Other (see comment) (Pt will need ambulance transport at d/c.)  Plan discussed with Pt/Family/Caregiver: Yes  Freedom of Choice Offered: Yes  Discharge Location  Discharge Placement: Home with hospice

## 2017-04-10 NOTE — PROGRESS NOTES
Problem: Patient Education: Go to Patient Education Activity  Goal: Patient/Family Education  PHYSICAL THERAPY TREATMENT  Patient: Pat Ferguson (82 y.o. male)  Date: 4/10/2017  Diagnosis: Aspiration pneumonia ([address]  Right sided weakness <principal problem not specified>       Precautions:    Chart, physical therapy assessment, plan of care and goals were reviewed. ASSESSMENT:  Pt comes to sit with max assist of 2. Pt progressed  to CGA dangling on EOB. Pt was able to perform knee extensions and shifting on elbows right then left with CGA to min assist.Pt was able to sit a total of 5 minutes. Pt returned to supine max assist of 2. Pt tolerated treatment well. Continue goals. Progression toward goals:  [ ]      Improving appropriately and progressing toward goals  [X]      Improving slowly and progressing toward goals  [ ]      Not making progress toward goals and plan of care will be adjusted       PLAN:  Patient continues to benefit from skilled intervention to address the above impairments. Continue treatment per established plan of care. Discharge Recommendations:  Home Health with 24hr assistance   Further Equipment Recommendations for Discharge:         SUBJECTIVE:          OBJECTIVE DATA SUMMARY:   Critical Behavior:  Neurologic State: Alert  Orientation Level: Unable to verbalize  Cognition: Decreased command following  Safety/Judgement: Lack of insight into deficits  Functional Mobility Training:  Bed Mobility:  Rolling: Maximum assistance;Assist x2  Supine to Sit: Maximum assistance;Assist x2  Sit to Supine: Total assistance;Assist x2  Scooting: Maximum assistance                    Balance:  Sitting: High guard  Sitting - Static: Fair (occasional)  Sitting - Dynamic: Occassional              Pain:  Pain Scale 1: Numeric (0 - 10)  Pain Intensity 1: 0              Activity Tolerance:   Pt tolerated treatment well. Please refer to the flowsheet for vital signs taken during this treatment.   After treatment:   [ ] Patient left in no apparent distress sitting up in chair  [X] Patient left in no apparent distress in bed  [ ] Call bell left within reach  [ ] Nursing notified  [ ] Caregiver present  [ ] Bed alarm activated      COMMUNICATION/COLLABORATION:   The patients plan of care was discussed with: Physical Therapist     Diane Goodson PTA   Time Calculation: 23 mins

## 2017-04-10 NOTE — WOUND CARE
Wound care consult: Initial inpatient wound care consult visit. Pt resting comfortably in bed, caregiver present. Pt in no apparent distress, shameka pain. Introduced self and explained plan for visit. Assessment  All skin folds and bony prominences assessed, turned with staff assistance. Pt able to assist with turning. Right lower extremity with two diabetic ulcers, which wife states have been treated at home by home health nursing and pt had seen by \"outpatient surgeon\". Mepilex in place to both wounds, which wife states is how they have been treated at home. Removed mepliex to right lateral heel, dark brown wound bed measuring 1.5 x 2 cm, no depth to wound, mild amount of drainage. Right lateral ankle has delayed blanching, skin intact. Removed mepliex to right lateral calf, dark brown wound bed measuring 3 x 2 cm with no depth. Carmen wound tissue in intact, blanching redness, both wound present on adimission. Sacral area has darkened skin that appears chronically discolored with some delayed blanching and moisture related macerated tissue damage to sacral crease and left gluteal area not over a bony promience. Pt is incontenent of stool and urine and is experiencing frequent soft stools currently per caregiver. Elbows intact with pink blanching. Left heel intact. Pt skin is fragile and sensitive per wife. Over left upper thoracic area pt has red linear marks with some blisters, which wife says is new and happens when he lays on wrinkles in sheets. Treatment  Cleansed both right lower extremity wounds, placed clean mepliex on both. Zinc paste applied to sacral area. Incontinent care given   Repositioned in bed   Heels floated in boots  Educated provided to pt and caregiver re skin health to avoid using briefs. Recommendations/Plan  Speciality bed ordered. Change mepilex to both right lower extremity wounds daily.  Use large mepilex to right heel to offer additional protection to later ankle with delayed blanching. Prevent wrinkles in bedding as much as possible, decrease layering of blankets and bads underneath pt. Continue to turn, reposition every 2 hours as tolerated, float heels, place in prevalon boots. Incontinent care with comfort shields. Apply Zinc to all open areas, moisture barrier as needed. Will follow, reconsult as needed.   Thank you,  Hector Lira RN

## 2017-04-10 NOTE — PROGRESS NOTES
Interdisciplinary team rounds were held 4/10/2017 with the following team members:Care Management, Nursing, Nutrition and Physical Therapy and the primary RN. Plan of care discussed. See clinical pathway and/or care plan for interventions and desired outcomes.     Discharge unknown at this time

## 2017-04-10 NOTE — PROGRESS NOTES
Visit with pt and pt's wife Melany Barr at bedside by Palliative Medicine: Dr. Mark Douglas and Chandrika. Melany Barr, although not wanting the word \"hospice\" mentioned in her 's hearing, expressed a clear leaning in that direction. She was clear that she wants him home but would appreciate any assistance she can have in caring for him. Chaplains will follow as needed.     Janette Barfield, , MDiv, MS, [facility]  [phone]

## 2017-04-10 NOTE — PROGRESS NOTES
Bedside and Verbal shift change report given to Melisa Felty (oncoming nurse) by Shanda May (offgoing nurse). Report included the following information SBAR, Kardex, Intake/Output, MAR and Recent Results.

## 2017-04-11 NOTE — PROGRESS NOTES
I have reviewed discharge instructions with the spouse. The spouse verbalized understanding. Extra supplies given to patient, patient provided with incontinence care prior to scheduled 1300  time. All questions and concerns were addressed.

## 2017-04-11 NOTE — PROGRESS NOTES
Problem: Dysphagia (Adult)  Goal: *Acute Goals and Plan of Care (Insert Text)  Swallowing goals initaited 4-10-17:  1) tolerate dysphagia 1, honey thick liquid with careful feeding with minimal aspiration by 4-14=17  2) Family and MD to determine POC for comfort Vs other by 4-14-17   [address] TREATMENT  Patient: Eleazar Howard (59 y.o. male)  Date: 4/11/2017  Diagnosis: Aspiration pneumonia ([address]  Right sided weakness <principal problem not specified>       Precautions: aspiration        ASSESSMENT:  Patient's wife is working with palliative care to set up home hospice. He has mild-moderate oral and moderate to severe pharyngeal dysphagia and high likelihood of aspiration chronically. Progression toward goals:  [ ]         Improving appropriately and progressing toward goals  [ ]         Improving slowly and progressing toward goals  [X]         Not making progress toward goals and plan of care will be adjusted       PLAN:  Recommendations and Planned Interventions:  Continue dysphagia 1 diet, honey thick liquids. HIGHLY RECOMMEND CONSIDERATION OF SCAPOLAMENE PATCH AND SUCTION AT HOME. Patient continues to benefit from skilled intervention to address the above impairments. Continue treatment per established plan of care. Discharge Recommendations:  HOME HOSPICE       SUBJECTIVE:   Patient's wife wants to make him comfortable. Patient more talkative today. OBJECTIVE:   Cognitive and Communication Status:  Neurologic State: Alert  Orientation Level: Oriented to person  Cognition: Decreased command following  Perception: Appears intact  Perseveration: No perseveration noted  Safety/Judgement: Lack of insight into deficits  Dysphagia Treatment:  Oral Assessment:     P.O. Trials:  Patient Position: upright in bed  Vocal quality prior to P.O.: No impairment  Consistency Presented: Puree; Honey thick liquid  How Presented: Spoon (wife fed)      frequent dry coughing on purees, honey thick liquids. Moderately imparied strength and timing issues. Wife reports that he has had lots of coughing,but has not had to use suction yet. She is glad it is there; just in case. Exercises:  Laryngeal Exercises:                                                                                                                                   Pain:  Pain Scale 1: Numeric (0 - 10)  Pain Intensity 1: 0     After treatment:   [ ]              Patient left in no apparent distress sitting up in chair  [ ]              Patient left in no apparent distress in bed  [ ]              Call bell left within reach  [ ]              Nursing notified  [ ]              Caregiver present  [ ]              Bed alarm activated      COMMUNICATION/EDUCATION:   Patient was educated regarding His deficit(s) of aspiration as this relates to His diagnosis of aspiration PNA. He demonstrated Guarded understanding as evidenced by dementia. Wife understood. .     The patients plan of care including recommendations, planned interventions, and recommended diet changes were discussed with: RN, MD.  [ ]              Posted safety precautions in patient's room.      RACHELL Altamirano  Time Calculation: 15 mins

## 2017-04-11 NOTE — PROGRESS NOTES
DDNR reviewed with wife and completed in preparation for discharge. Pt will be admitted to [address] at home. Original and copy of form provided to wife, copy placed on chart to be scanned into medical record.

## 2017-04-11 NOTE — PROGRESS NOTES
Bedside and Verbal shift change report given to Tere Martinez (oncoming nurse) by Risa Hammans (offgoing nurse). Report included the following information SBAR, Kardex and Recent Results.

## 2017-04-11 NOTE — PROGRESS NOTES
Music Therapy Assessment    Juan Zambrano [id number]  [id number]    1945  66 y.o.  male    Patient Telephone Number: [phone] (home)   Hinduism Affiliation: Unknown   Language: English   Extended Emergency Contact Information  Primary Emergency Contact: [address] Phone: [phone]  Relation: Spouse  Secondary Emergency Contact: [address] [facility] Phone: [phone]  Mobile Phone: [phone]  Relation: Daughter   Patient Active Problem List    Diagnosis Date Noted    Aspiration pneumonia ([facility] 04/08/2017    Right sided weakness 04/08/2017    Rash and nonspecific skin eruption 02/17/2017    Dementia without behavioral disturbance 06/22/2016    Severe single current episode of major depressive disorder, with psychotic features ([address] 06/22/2016    Diabetes mellitus type 2 with complications ([facility] 00/33/5861    Late onset Alzheimer's disease with behavioral disturbance 03/01/2016    Anxiety 05/08/2015    S/P colonoscopy 05/28/2014    Arthritis of neck ([facility] 05/16/2013    Glaucoma 03/29/2011    Positional vertigo 03/29/2011    Tinnitus 03/29/2011    Hearing loss, sensorineural 03/29/2011    Gastritis 09/30/2010    Essential hypertension, benign 02/24/2010     Class: Chronic    Spinal stenosis, unspecified region other than cervical 02/24/2010     Class: Chronic    Horseshoe kidney 02/24/2010     Class: Chronic    Asplenia 02/24/2010     Class: Chronic    Osteoarthrosis, unspecified whether generalized or localized, other specified sites 02/24/2010     Class: Chronic    Controlled type 2 diabetes mellitus with skin complication, without long-term current use of insulin ([facility] 02/24/2010     Class: Chronic    Irritable bowel syndrome 02/24/2010     Class: Chronic    Pure hypercholesterolemia      Class: Chronic        Date: 4/11/2017       Mental Status:   [x] Alert [  ] Corinne Lis [  ]  Confused     Communication Status: [  ] Impaired Speech [  ] Nonverbal     Physical Status:   [  ] Hard of Hearing [  ] Oxygen in use [  ] Ambulatory   [  ] Ambulatory with assistance  [  ] Non-ambulatory -N/A    Music Preferences, Background: Country and [place]. The patient said he used to sing [place] songs around the house with his brother-in-law, who'd play the guitar. Clinical Problem addressed: Spiritual support, pt/family coping. Goal(s) met in session:  Physical/Pain management (Scale of 1-10):    Pre-session rating: Pt denied pain. Post-session rating: Pt denied pain. [  ] Increased relaxation   [  ] Regulated breathing patterns  [  ] Minimized physical distress   [  ] Decreased nausea discomfort     Emotional/Psychological:  Expression of feelings _____________ [  ] Decreased aggressive behavior   [  ] Decreased sadness   [  ] Increased range of coping skills     [x] Improved/enhanced mood   [  ] Decreased withdrawn behavior     Social:  [  ] Decreased feelings of isolation  [x] Positive social interaction   [x] Provided support and/or comfort for family    Spiritual:  [x] Spiritual support    [  ] Expressed peace     Techniques Utilized (Check all that apply):   [  ] Procedural support MT [  ] Music for relaxation [x] Patient preferred music  [  ] Taylor analysis  [x] Song choice  [  ] Music for validation  [  ] Music listening  [  ] Progressive relaxation [  ] Guided imagery  [  ] Porter Cap  [  ] TIMP   [  ] [place] Offer writing  [x] Sing along   [  ] Juan Jimenez  [  ] Sensory stimulation  [  ] Active Listening  [x] Music for spiritual support [  ] Making of CDs as gifts    Session Observations:  Referral from Dr. Francia Michael, Palliative. The patient (pt) was observed to be sitting up comfortably in bed. His spouse, brother, and sister-in-law Estephanie Babb were sitting at bedside. The pt denied pain and he said he liked any music but rock and roll.  He shared more about his music preferences with prompting from this music therapist (MT) and his family members. The MT sang and played the [employer] in [employer] with guitar. The pt and his family members' affects brightened and they increased self-expression in response to the music, as evidenced by (AEB) smiling and laughing with brightened eyes, and singing along to the choruses. They expressed enjoyment in the music. The pt's brother said the pt's favorite [employer] song is [address] The MT sang and played this with guitar. The pt and his family members increased self-expression in response to the music, AEB singing along. After the song, the pt's brother made a point of observing aloud that he didn't remember the song lyrics past the first verse, and the pt remembered all three verses. The MT suggested other hymns and asked the pt to choose what he'd like to sing next. He chose [employer] and sang along as the MT sang and played this. The pt increased self-expression in response to the music, AEB reminiscing about singing [employer] songs around the house whenever his brother-in-law came over, who played guitar. The pt's affect was bright and he and his family members expressed much enjoyment and gratitude for the music and session. Will follow as able.     Tori Bella MT-BC (Music Therapist-Board Certified)  Spiritual Care Department  Referral-based service

## 2017-04-11 NOTE — DISCHARGE INSTRUCTIONS
HOME HOSPICE DISCHARGE INSTRUCTIONS    Eleazar Howard / [id number] : 1945    Admission date: 2017 Discharge date: 2017       Primary care provider: Jamari Mendez MD    Discharging provider:  Cici Valdez MD  - Family Medicine Resident  Elissa Diaz MD - Attending, Family Medicine   . . . . . . . . . . . . . . . . . . . . . . . . . . . . . . . . . . . . . . . . . . . . . . . . . . . . . . . . . . . . . . . . . . . . . . . Lorenso Frankel FINAL DIAGNOSES & HOSPITAL COURSE:  Eleazar Howard is a 66 y.o. male with Alzheimer's Dementia, T2DM with peripheral neuropathy, HLD, spinal stenosis, GERD, HTN, Anxiety/Major depression who is admitted for aspiration pneumonia and stroke/TIA rule out. CXR showed right basilar opacity that was concerning for aspiration PNA. Pt treated with Unasyn during admission. Blood cultures negative x3d. Stroke work-up negative so far. ECHO from : EF 60 % to 65 %, no regional wall motion abnormalities. A1C 6.2. Lipid panel WNL. Neurology followed patient and etiology likely near-syncopal episode vs. TIA. On day of discharge: Pt afebrile and VSS. Have decided on discharge with home hospice which has been set up. Will discharge with 4 days of augmentin to complete treatment for aspiration PNA. New Medications: Augmentin - take 1 tab every twice daily (once in morning and once at night) for 4 days. All other medications as per home hospice recommendations     PENDING TEST RESULTS:  At the time of discharge the following test results are still pending: None    Please review these results as they become available. Specific symptoms to watch for: chest pain, shortness of breath, fever, chills, nausea, vomiting, diarrhea, change in mentation, falling, weakness, bleeding. DIET:  Dysphagia 1 honey thick liquid    ACTIVITY:  Bedrest    WOUND CARE: As previously caring for diabetic foot ulcer and sacral ulcer     EQUIPMENT needed:   To be determined by hospice     GOALS OF CARE: Eventual return to home/independent/assisted living     Long term SNF    X  Home Hospice     No rehospitalization     Patient condition at discharge:   Functional status  X  Poor      Deconditioned      Independent   Cognition    Lucid     Forgetful (some sensescence)   X  Dementia   Catheters/lines (plus indication)    Rayo     PICC      PEG    X  None   Code status    Full code    X  DNR    . . . . . . . . . . . . . . . . . . . . . . . . . . . . . . . . . . . . . . . . . . . . . . . . . . . . . . . . . . . . . . . . . . . . . . . Missy Martínez CHRONIC MEDICAL CONDITIONS:  Problem List as of 4/11/2017  Date Reviewed: 4/10/2017          Codes Class Noted - Resolved    Aspiration pneumonia ([facility] ICD-10-CM: J69.0  ICD-9-CM: 507.0  4/8/2017 - Present        Right sided weakness ICD-10-CM: R53.1  ICD-9-CM: 728.87  4/8/2017 - Present        Rash and nonspecific skin eruption ICD-10-CM: R21  ICD-9-CM: 782.1  2/17/2017 - Present        Dementia without behavioral disturbance ICD-10-CM: F03.90  ICD-9-CM: 294.20  6/22/2016 - Present        Severe single current episode of major depressive disorder, with psychotic features ([facility] (Chronic) ICD-10-CM: F32.3  ICD-9-CM: 296.24  6/22/2016 - Present        Diabetes mellitus type 2 with complications ([facility] (Chronic) ICD-10-CM: E11.8  ICD-9-CM: 250.90  3/1/2016 - Present        Late onset Alzheimer's disease with behavioral disturbance (Chronic) ICD-10-CM: G30.1, F02.81  ICD-9-CM: 331.0, 294.11  3/1/2016 - Present        Anxiety ICD-10-CM: F41.9  ICD-9-CM: 300.00  5/8/2015 - Present        S/P colonoscopy (Chronic) ICD-10-CM: V14.611  ICD-9-CM: V45.89  5/28/2014 - Present    Overview Signed 5/28/2014 10:48 AM by Loren Van MD     2010 by Dr. Ba Mckeon. IBS.              Arthritis of neck (HCC) (Chronic) ICD-10-CM: M46.92  ICD-9-CM: 721.0  5/16/2013 - Present        Glaucoma ICD-10-CM: H40.9  ICD-9-CM: 365.9  3/29/2011 - Present        Positional vertigo (Chronic) ICD-10-CM: H81.10  ICD-9-CM: 386.11  3/29/2011 - Present        Tinnitus (Chronic) ICD-10-CM: H93.19  ICD-9-CM: 388.30  3/29/2011 - Present    Overview Signed 3/29/2011 10:56 AM by Nasim Moreno MD     Right Ear. Hearing loss, sensorineural (Chronic) ICD-10-CM: H90.5  ICD-9-CM: 389.10  3/29/2011 - Present    Overview Signed 3/29/2011 10:57 AM by Nasim Moreno MD     Both ears               Gastritis (Chronic) ICD-10-CM: K29.70  ICD-9-CM: 535.50  9/30/2010 - Present    Overview Signed 9/30/2010 11:11 AM by Nasim Moreno MD     Had endoscopy and colonoscopy in 6/10. HAS IBS. Dr Konstantin Hancock. Pure hypercholesterolemia (Chronic) ICD-10-CM: E78.00  ICD-9-CM: 272.0 Chronic Unknown - Present        Essential hypertension, benign (Chronic) ICD-10-CM: I10  ICD-9-CM: 343. 1 Chronic 2/24/2010 - Present        Spinal stenosis, unspecified region other than cervical (Chronic) ICD-10-CM: M48.00  ICD-9-CM: 724.00 Chronic 2/24/2010 - Present    Overview Signed 12/28/2010 10:42 AM by MD Dr. Raleigh Diaz. Legs will go numb after standing 3 minute. Horseshoe kidney (Chronic) ICD-10-CM: Q63.1  ICD-9-CM: 995. 3 Chronic 2/24/2010 - Present        Asplenia (Chronic) ICD-10-CM: Q89.01  ICD-9-CM: 759.0 Chronic 2/24/2010 - Present        Osteoarthrosis, unspecified whether generalized or localized, other specified sites (Chronic) ICD-10-CM: M19.90  ICD-9-CM: 715.98 Chronic 2/24/2010 - Present        Controlled type 2 diabetes mellitus with skin complication, without long-term current use of insulin (HCC) (Chronic) ICD-10-CM: E11.628  ICD-9-CM: 250.80 Chronic 2/24/2010 - Present        Irritable bowel syndrome (Chronic) ICD-10-CM: K58.9  ICD-9-CM: 564.1 Chronic 2/24/2010 - Present              Information obtained by :   I understand that if any problems occur once I am at home I am to contact my physician. I understand and acknowledge receipt of the instructions indicated above. Physician's or R.N.'s Signature                                                                  Date/Time                                                                                                                                              Patient or Representative Signature                                                          Date/Time

## 2017-04-11 NOTE — PROGRESS NOTES
1350:  [facility] [address] ISAÍAS Olvera    CM Note:  Pt accepted [facility]. A referral was sent in [facility] to [facility] who will do a 1 pm . Vaishnavi Carter ([phone]) will meet with pt's wife.   ISAÍAS Olvera

## 2017-04-12 NOTE — PROGRESS NOTES
CM Note:  Pt was not admitted to [place] [address] yesterday. Per the nurse he was able to carry on a lengthy conversation with her with multiple happenings going on around him. They will continue to f/u and evaluate him. Pt had called [facility] and spoke to Leatha Jones. She will contact the PCP to resume services with the pt.   ISAÍAS Olvera

## 2017-04-27 ENCOUNTER — TELEPHONE (OUTPATIENT)
Dept: NEUROLOGY | Age: 72
End: 2017-04-27

## 2017-04-27 NOTE — TELEPHONE ENCOUNTER
----- Message from Wilman Salguero sent at 4/27/2017 10:00 AM EDT -----  Regarding: /Telephone  Pt's wife(Pamela Nunez) stated pt passed away on Sunday 04/23/17, and would like to speak with\"Naima\". Best contact number [phone].

## 2017-05-01 NOTE — TELEPHONE ENCOUNTER
Spoke to spouse who informed us that patient had declined. He was taken to [facility] and admitted with TIA and aspiration pneumonia. Discharged home with Hospice. Spouse very thankful for the care patient received in this office. Informed her Dr Andrew Dixon was notified and sends his condolences.

## 2021-03-02 NOTE — PROGRESS NOTES
DELONTE Note:  Order for hospice noted. A referral was sent in [facility] to Carladelano Shultz. Awaiting reply.   ISAÍAS Olvera Patient's son Charline Ku called in stating that Joel Filler is having issues with her Exelon patch. She was getting better and once she started the patch, she started getting more confused, she is not sleeping and her legs are trembling more. Please advise.